# Patient Record
Sex: FEMALE | Race: WHITE | Employment: OTHER | URBAN - METROPOLITAN AREA
[De-identification: names, ages, dates, MRNs, and addresses within clinical notes are randomized per-mention and may not be internally consistent; named-entity substitution may affect disease eponyms.]

---

## 2017-01-27 RX ORDER — ROSUVASTATIN CALCIUM 10 MG/1
TABLET, COATED ORAL
Qty: 30 TABLET | Refills: 3 | Status: SHIPPED | OUTPATIENT
Start: 2017-01-27 | End: 2017-06-02 | Stop reason: SDUPTHER

## 2017-01-27 RX ORDER — LEVOTHYROXINE SODIUM 0.05 MG/1
TABLET ORAL
Qty: 30 TABLET | Refills: 3 | Status: SHIPPED | OUTPATIENT
Start: 2017-01-27 | End: 2017-05-01 | Stop reason: SDUPTHER

## 2017-01-27 RX ORDER — FLUTICASONE PROPIONATE 50 MCG
SPRAY, SUSPENSION (ML) NASAL
Qty: 1 BOTTLE | Refills: 3 | Status: SHIPPED | OUTPATIENT
Start: 2017-01-27 | End: 2018-12-17 | Stop reason: SDUPTHER

## 2017-01-30 RX ORDER — SERTRALINE HYDROCHLORIDE 25 MG/1
TABLET, FILM COATED ORAL
Qty: 30 TABLET | Refills: 3 | Status: SHIPPED | OUTPATIENT
Start: 2017-01-30 | End: 2017-05-01 | Stop reason: SDUPTHER

## 2017-01-30 RX ORDER — PANTOPRAZOLE SODIUM 40 MG/1
TABLET, DELAYED RELEASE ORAL
Qty: 30 TABLET | Refills: 3 | Status: SHIPPED | OUTPATIENT
Start: 2017-01-30 | End: 2017-05-01 | Stop reason: SDUPTHER

## 2017-03-22 ENCOUNTER — OFFICE VISIT (OUTPATIENT)
Dept: INTERNAL MEDICINE | Age: 82
End: 2017-03-22

## 2017-03-22 VITALS
SYSTOLIC BLOOD PRESSURE: 142 MMHG | TEMPERATURE: 97.3 F | WEIGHT: 202 LBS | HEART RATE: 72 BPM | HEIGHT: 62 IN | BODY MASS INDEX: 37.17 KG/M2 | DIASTOLIC BLOOD PRESSURE: 90 MMHG | OXYGEN SATURATION: 92 %

## 2017-03-22 DIAGNOSIS — R35.0 FREQUENCY OF URINATION: ICD-10-CM

## 2017-03-22 DIAGNOSIS — E03.9 ACQUIRED HYPOTHYROIDISM: Chronic | ICD-10-CM

## 2017-03-22 DIAGNOSIS — I10 ESSENTIAL HYPERTENSION: Primary | Chronic | ICD-10-CM

## 2017-03-22 PROCEDURE — 1123F ACP DISCUSS/DSCN MKR DOCD: CPT | Performed by: INTERNAL MEDICINE

## 2017-03-22 PROCEDURE — 99213 OFFICE O/P EST LOW 20 MIN: CPT | Performed by: INTERNAL MEDICINE

## 2017-03-22 PROCEDURE — 1036F TOBACCO NON-USER: CPT | Performed by: INTERNAL MEDICINE

## 2017-03-22 PROCEDURE — G8417 CALC BMI ABV UP PARAM F/U: HCPCS | Performed by: INTERNAL MEDICINE

## 2017-03-22 PROCEDURE — 4040F PNEUMOC VAC/ADMIN/RCVD: CPT | Performed by: INTERNAL MEDICINE

## 2017-03-22 PROCEDURE — G8484 FLU IMMUNIZE NO ADMIN: HCPCS | Performed by: INTERNAL MEDICINE

## 2017-03-22 PROCEDURE — 1090F PRES/ABSN URINE INCON ASSESS: CPT | Performed by: INTERNAL MEDICINE

## 2017-03-22 PROCEDURE — G8427 DOCREV CUR MEDS BY ELIG CLIN: HCPCS | Performed by: INTERNAL MEDICINE

## 2017-03-22 PROCEDURE — G8400 PT W/DXA NO RESULTS DOC: HCPCS | Performed by: INTERNAL MEDICINE

## 2017-03-22 ASSESSMENT — ENCOUNTER SYMPTOMS
WHEEZING: 0
CONSTIPATION: 0
SHORTNESS OF BREATH: 1
CHOKING: 0
NAUSEA: 0
ABDOMINAL DISTENTION: 0
ORTHOPNEA: 0
EYE PAIN: 0
TROUBLE SWALLOWING: 0
BACK PAIN: 1
COUGH: 0

## 2017-03-24 RX ORDER — VALSARTAN AND HYDROCHLOROTHIAZIDE 160; 25 MG/1; MG/1
TABLET ORAL
Qty: 30 TABLET | Refills: 3 | Status: SHIPPED | OUTPATIENT
Start: 2017-03-24 | End: 2017-05-09

## 2017-05-01 RX ORDER — PANTOPRAZOLE SODIUM 40 MG/1
40 TABLET, DELAYED RELEASE ORAL DAILY PRN
Qty: 30 TABLET | Refills: 2 | Status: SHIPPED | OUTPATIENT
Start: 2017-05-01 | End: 2017-06-28

## 2017-05-01 RX ORDER — LEVOTHYROXINE SODIUM 0.05 MG/1
TABLET ORAL
Qty: 30 TABLET | Refills: 2 | Status: SHIPPED | OUTPATIENT
Start: 2017-05-01 | End: 2017-07-31 | Stop reason: SDUPTHER

## 2017-05-01 RX ORDER — SERTRALINE HYDROCHLORIDE 25 MG/1
TABLET, FILM COATED ORAL
Qty: 30 TABLET | Refills: 2 | Status: SHIPPED | OUTPATIENT
Start: 2017-05-01 | End: 2017-07-31 | Stop reason: SDUPTHER

## 2017-05-03 DIAGNOSIS — E03.9 ACQUIRED HYPOTHYROIDISM: Chronic | ICD-10-CM

## 2017-05-03 DIAGNOSIS — I10 ESSENTIAL HYPERTENSION: Chronic | ICD-10-CM

## 2017-05-03 DIAGNOSIS — R35.0 FREQUENCY OF URINATION: ICD-10-CM

## 2017-05-03 LAB
ANION GAP SERPL CALCULATED.3IONS-SCNC: 12 MEQ/L (ref 7–13)
BACTERIA: NORMAL /HPF
BILIRUBIN URINE: NEGATIVE
BLOOD, URINE: NEGATIVE
BUN BLDV-MCNC: 22 MG/DL (ref 8–23)
CALCIUM SERPL-MCNC: 9.2 MG/DL (ref 8.6–10.2)
CHLORIDE BLD-SCNC: 97 MEQ/L (ref 98–107)
CHOLESTEROL, TOTAL: 157 MG/DL (ref 0–199)
CLARITY: ABNORMAL
CO2: 31 MEQ/L (ref 22–29)
COLOR: YELLOW
CREAT SERPL-MCNC: 0.7 MG/DL (ref 0.5–0.9)
EPITHELIAL CELLS, UA: NORMAL /HPF
GFR AFRICAN AMERICAN: >60
GFR NON-AFRICAN AMERICAN: >60
GLUCOSE BLD-MCNC: 104 MG/DL (ref 74–109)
GLUCOSE URINE: NEGATIVE MG/DL
HCT VFR BLD CALC: 41.7 % (ref 37–47)
HDLC SERPL-MCNC: 48 MG/DL (ref 40–59)
HEMOGLOBIN: 14.2 G/DL (ref 12–16)
KETONES, URINE: NEGATIVE MG/DL
LDL CHOLESTEROL CALCULATED: 87 MG/DL (ref 0–129)
LEUKOCYTE ESTERASE, URINE: ABNORMAL
MCH RBC QN AUTO: 30.7 PG (ref 27–31.3)
MCHC RBC AUTO-ENTMCNC: 34.1 % (ref 33–37)
MCV RBC AUTO: 90.3 FL (ref 82–100)
NITRITE, URINE: NEGATIVE
PDW BLD-RTO: 13.8 % (ref 11.5–14.5)
PH UA: 5 (ref 5–9)
PLATELET # BLD: 184 K/UL (ref 130–400)
POTASSIUM SERPL-SCNC: 3.2 MEQ/L (ref 3.5–5.1)
PROTEIN UA: NEGATIVE MG/DL
RBC # BLD: 4.62 M/UL (ref 4.2–5.4)
RBC UA: NORMAL /HPF (ref 0–2)
RENAL EPITHELIAL, UA: NORMAL /HPF
SODIUM BLD-SCNC: 140 MEQ/L (ref 132–144)
SPECIFIC GRAVITY UA: 1.02 (ref 1–1.03)
TRIGL SERPL-MCNC: 112 MG/DL (ref 0–200)
TSH SERPL DL<=0.05 MIU/L-ACNC: 2.46 UIU/ML (ref 0.27–4.2)
UROBILINOGEN, URINE: 0.2 E.U./DL
WBC # BLD: 6.3 K/UL (ref 4.8–10.8)
WBC UA: NORMAL /HPF (ref 0–5)

## 2017-05-04 LAB — URINE CULTURE, ROUTINE: NORMAL

## 2017-05-09 DIAGNOSIS — E87.6 HYPOKALEMIA: Primary | ICD-10-CM

## 2017-05-09 RX ORDER — VALSARTAN AND HYDROCHLOROTHIAZIDE 160; 12.5 MG/1; MG/1
1 TABLET, FILM COATED ORAL DAILY
Qty: 30 TABLET | Refills: 3 | Status: SHIPPED | OUTPATIENT
Start: 2017-05-09 | End: 2017-07-31 | Stop reason: SDUPTHER

## 2017-05-09 RX ORDER — POTASSIUM CHLORIDE 20 MEQ/1
TABLET, EXTENDED RELEASE ORAL
Qty: 7 TABLET | Refills: 0 | Status: SHIPPED | OUTPATIENT
Start: 2017-05-09 | End: 2017-08-22 | Stop reason: ALTCHOICE

## 2017-05-24 RX ORDER — PANTOPRAZOLE SODIUM 40 MG/1
TABLET, DELAYED RELEASE ORAL
Qty: 30 TABLET | OUTPATIENT
Start: 2017-05-24

## 2017-06-05 RX ORDER — ROSUVASTATIN CALCIUM 10 MG/1
TABLET, COATED ORAL
Qty: 30 TABLET | Refills: 3 | Status: SHIPPED | OUTPATIENT
Start: 2017-06-05 | End: 2017-08-22 | Stop reason: SDUPTHER

## 2017-06-28 RX ORDER — PANTOPRAZOLE SODIUM 40 MG/1
40 TABLET, DELAYED RELEASE ORAL DAILY PRN
Qty: 30 TABLET | Refills: 3 | Status: SHIPPED | OUTPATIENT
Start: 2017-06-28 | End: 2017-08-22 | Stop reason: SDUPTHER

## 2017-06-28 RX ORDER — VALSARTAN AND HYDROCHLOROTHIAZIDE 160; 25 MG/1; MG/1
TABLET ORAL
Refills: 3 | COMMUNITY
Start: 2017-06-01 | End: 2017-06-28

## 2017-07-07 DIAGNOSIS — E87.6 HYPOKALEMIA: ICD-10-CM

## 2017-07-07 LAB
ANION GAP SERPL CALCULATED.3IONS-SCNC: 12 MEQ/L (ref 7–13)
BUN BLDV-MCNC: 16 MG/DL (ref 8–23)
CALCIUM SERPL-MCNC: 9.3 MG/DL (ref 8.6–10.2)
CHLORIDE BLD-SCNC: 98 MEQ/L (ref 98–107)
CO2: 27 MEQ/L (ref 22–29)
CREAT SERPL-MCNC: 0.64 MG/DL (ref 0.5–0.9)
GFR AFRICAN AMERICAN: >60
GFR NON-AFRICAN AMERICAN: >60
GLUCOSE BLD-MCNC: 102 MG/DL (ref 74–109)
POTASSIUM SERPL-SCNC: 4 MEQ/L (ref 3.5–5.1)
SODIUM BLD-SCNC: 137 MEQ/L (ref 132–144)

## 2017-07-31 RX ORDER — SERTRALINE HYDROCHLORIDE 25 MG/1
TABLET, FILM COATED ORAL
Qty: 30 TABLET | Refills: 5 | Status: SHIPPED | OUTPATIENT
Start: 2017-07-31 | End: 2017-08-22 | Stop reason: SDUPTHER

## 2017-07-31 RX ORDER — LEVOTHYROXINE SODIUM 0.05 MG/1
TABLET ORAL
Qty: 30 TABLET | Refills: 5 | Status: SHIPPED | OUTPATIENT
Start: 2017-07-31 | End: 2017-08-22 | Stop reason: SDUPTHER

## 2017-07-31 RX ORDER — VALSARTAN AND HYDROCHLOROTHIAZIDE 160; 12.5 MG/1; MG/1
TABLET, FILM COATED ORAL
Qty: 30 TABLET | Refills: 5 | Status: SHIPPED | OUTPATIENT
Start: 2017-07-31 | End: 2017-08-22

## 2017-08-22 ENCOUNTER — OFFICE VISIT (OUTPATIENT)
Dept: INTERNAL MEDICINE | Age: 82
End: 2017-08-22

## 2017-08-22 VITALS
TEMPERATURE: 99 F | WEIGHT: 204 LBS | OXYGEN SATURATION: 98 % | BODY MASS INDEX: 37.54 KG/M2 | HEIGHT: 62 IN | SYSTOLIC BLOOD PRESSURE: 110 MMHG | HEART RATE: 67 BPM | DIASTOLIC BLOOD PRESSURE: 70 MMHG
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
## 2017-08-22 DIAGNOSIS — Z12.31 VISIT FOR SCREENING MAMMOGRAM: ICD-10-CM

## 2017-08-22 DIAGNOSIS — E78.2 MIXED HYPERLIPIDEMIA: Chronic | ICD-10-CM

## 2017-08-22 DIAGNOSIS — J44.9 CHRONIC OBSTRUCTIVE PULMONARY DISEASE, UNSPECIFIED COPD TYPE (HCC): ICD-10-CM

## 2017-08-22 DIAGNOSIS — I10 ESSENTIAL HYPERTENSION: Primary | Chronic | ICD-10-CM

## 2017-08-22 PROCEDURE — G8427 DOCREV CUR MEDS BY ELIG CLIN: HCPCS | Performed by: INTERNAL MEDICINE

## 2017-08-22 PROCEDURE — 99214 OFFICE O/P EST MOD 30 MIN: CPT | Performed by: INTERNAL MEDICINE

## 2017-08-22 PROCEDURE — 1036F TOBACCO NON-USER: CPT | Performed by: INTERNAL MEDICINE

## 2017-08-22 PROCEDURE — 4040F PNEUMOC VAC/ADMIN/RCVD: CPT | Performed by: INTERNAL MEDICINE

## 2017-08-22 PROCEDURE — G8417 CALC BMI ABV UP PARAM F/U: HCPCS | Performed by: INTERNAL MEDICINE

## 2017-08-22 PROCEDURE — G8926 SPIRO NO PERF OR DOC: HCPCS | Performed by: INTERNAL MEDICINE

## 2017-08-22 PROCEDURE — 1090F PRES/ABSN URINE INCON ASSESS: CPT | Performed by: INTERNAL MEDICINE

## 2017-08-22 PROCEDURE — G8400 PT W/DXA NO RESULTS DOC: HCPCS | Performed by: INTERNAL MEDICINE

## 2017-08-22 PROCEDURE — 1123F ACP DISCUSS/DSCN MKR DOCD: CPT | Performed by: INTERNAL MEDICINE

## 2017-08-22 PROCEDURE — 3023F SPIROM DOC REV: CPT | Performed by: INTERNAL MEDICINE

## 2017-08-22 RX ORDER — PANTOPRAZOLE SODIUM 40 MG/1
40 TABLET, DELAYED RELEASE ORAL DAILY PRN
Qty: 90 TABLET | Refills: 1 | Status: SHIPPED | OUTPATIENT
Start: 2017-08-22 | End: 2018-04-30 | Stop reason: SDUPTHER

## 2017-08-22 RX ORDER — ROSUVASTATIN CALCIUM 10 MG/1
TABLET, COATED ORAL
Qty: 90 TABLET | Refills: 1 | Status: SHIPPED | OUTPATIENT
Start: 2017-08-22 | End: 2017-11-16

## 2017-08-22 RX ORDER — LEVOTHYROXINE SODIUM 0.05 MG/1
TABLET ORAL
Qty: 90 TABLET | Refills: 1 | Status: SHIPPED | OUTPATIENT
Start: 2017-08-22 | End: 2018-02-19

## 2017-08-22 RX ORDER — VALSARTAN 160 MG/1
160 TABLET ORAL NIGHTLY
Qty: 90 TABLET | Refills: 1 | Status: SHIPPED | OUTPATIENT
Start: 2017-08-22 | End: 2018-01-21 | Stop reason: SDUPTHER

## 2017-08-22 RX ORDER — SERTRALINE HYDROCHLORIDE 25 MG/1
25 TABLET, FILM COATED ORAL DAILY
Qty: 90 TABLET | Refills: 1 | Status: SHIPPED | OUTPATIENT
Start: 2017-08-22 | End: 2018-07-25 | Stop reason: SDUPTHER

## 2017-08-22 ASSESSMENT — ENCOUNTER SYMPTOMS
CHOKING: 0
ABDOMINAL DISTENTION: 0
CONSTIPATION: 0
WHEEZING: 0
EYE PAIN: 0
NAUSEA: 0
BOWEL INCONTINENCE: 0
TROUBLE SWALLOWING: 0
SHORTNESS OF BREATH: 1
ORTHOPNEA: 0
BACK PAIN: 1
COUGH: 0

## 2017-08-31 ENCOUNTER — APPOINTMENT (OUTPATIENT)
Dept: GENERAL RADIOLOGY | Age: 82
DRG: 379 | End: 2017-08-31
Payer: MEDICARE

## 2017-08-31 ENCOUNTER — APPOINTMENT (OUTPATIENT)
Dept: CT IMAGING | Age: 82
DRG: 379 | End: 2017-08-31
Payer: MEDICARE

## 2017-08-31 ENCOUNTER — HOSPITAL ENCOUNTER (INPATIENT)
Age: 82
LOS: 1 days | Discharge: HOME | DRG: 379 | End: 2017-09-01
Attending: INTERNAL MEDICINE | Admitting: INTERNAL MEDICINE
Payer: MEDICARE

## 2017-08-31 DIAGNOSIS — K57.93 GASTROINTESTINAL HEMORRHAGE ASSOCIATED WITH INTESTINAL DIVERTICULITIS: Primary | ICD-10-CM

## 2017-08-31 LAB
ABO/RH: NORMAL
ALBUMIN SERPL-MCNC: 4 G/DL (ref 3.9–4.9)
ALP BLD-CCNC: 69 U/L (ref 40–130)
ALT SERPL-CCNC: 65 U/L (ref 0–33)
ANION GAP SERPL CALCULATED.3IONS-SCNC: 12 MEQ/L (ref 7–13)
ANTIBODY SCREEN: NORMAL
APTT: 25.4 SEC (ref 21.6–35.4)
AST SERPL-CCNC: 55 U/L (ref 0–35)
BILIRUB SERPL-MCNC: 0.4 MG/DL (ref 0–1.2)
BUN BLDV-MCNC: 18 MG/DL (ref 8–23)
CALCIUM SERPL-MCNC: 8.9 MG/DL (ref 8.6–10.2)
CHLORIDE BLD-SCNC: 102 MEQ/L (ref 98–107)
CO2: 28 MEQ/L (ref 22–29)
CREAT SERPL-MCNC: 0.49 MG/DL (ref 0.5–0.9)
GFR AFRICAN AMERICAN: >60
GFR NON-AFRICAN AMERICAN: >60
GLOBULIN: 2.6 G/DL (ref 2.3–3.5)
GLUCOSE BLD-MCNC: 80 MG/DL (ref 74–109)
HCT VFR BLD CALC: 39.3 % (ref 37–47)
HEMOGLOBIN: 12.8 G/DL (ref 12–16)
INR BLD: 1
LIPASE: 17 U/L (ref 13–60)
MCH RBC QN AUTO: 29.5 PG (ref 27–31.3)
MCHC RBC AUTO-ENTMCNC: 32.5 % (ref 33–37)
MCV RBC AUTO: 90.8 FL (ref 82–100)
PDW BLD-RTO: 13.7 % (ref 11.5–14.5)
PLATELET # BLD: 163 K/UL (ref 130–400)
POTASSIUM SERPL-SCNC: 3.9 MEQ/L (ref 3.5–5.1)
PROTHROMBIN TIME: 10.7 SEC (ref 8.1–13.7)
RBC # BLD: 4.33 M/UL (ref 4.2–5.4)
SODIUM BLD-SCNC: 142 MEQ/L (ref 132–144)
TOTAL CK: 78 U/L (ref 0–170)
TOTAL PROTEIN: 6.6 G/DL (ref 6.4–8.1)
TROPONIN: <0.01 NG/ML (ref 0–0.01)
WBC # BLD: 6.1 K/UL (ref 4.8–10.8)

## 2017-08-31 PROCEDURE — 86901 BLOOD TYPING SEROLOGIC RH(D): CPT

## 2017-08-31 PROCEDURE — 74177 CT ABD & PELVIS W/CONTRAST: CPT

## 2017-08-31 PROCEDURE — 6360000002 HC RX W HCPCS: Performed by: PHYSICIAN ASSISTANT

## 2017-08-31 PROCEDURE — 1210000000 HC MED SURG R&B

## 2017-08-31 PROCEDURE — 85027 COMPLETE CBC AUTOMATED: CPT

## 2017-08-31 PROCEDURE — 84484 ASSAY OF TROPONIN QUANT: CPT

## 2017-08-31 PROCEDURE — 6370000000 HC RX 637 (ALT 250 FOR IP): Performed by: INTERNAL MEDICINE

## 2017-08-31 PROCEDURE — 99285 EMERGENCY DEPT VISIT HI MDM: CPT

## 2017-08-31 PROCEDURE — 86850 RBC ANTIBODY SCREEN: CPT

## 2017-08-31 PROCEDURE — 2500000003 HC RX 250 WO HCPCS: Performed by: PHYSICIAN ASSISTANT

## 2017-08-31 PROCEDURE — 83690 ASSAY OF LIPASE: CPT

## 2017-08-31 PROCEDURE — 71010 XR CHEST PORTABLE: CPT

## 2017-08-31 PROCEDURE — 6370000000 HC RX 637 (ALT 250 FOR IP): Performed by: SPECIALIST

## 2017-08-31 PROCEDURE — 2580000003 HC RX 258: Performed by: SPECIALIST

## 2017-08-31 PROCEDURE — 36415 COLL VENOUS BLD VENIPUNCTURE: CPT

## 2017-08-31 PROCEDURE — 80053 COMPREHEN METABOLIC PANEL: CPT

## 2017-08-31 PROCEDURE — 93005 ELECTROCARDIOGRAM TRACING: CPT

## 2017-08-31 PROCEDURE — 85730 THROMBOPLASTIN TIME PARTIAL: CPT

## 2017-08-31 PROCEDURE — C9113 INJ PANTOPRAZOLE SODIUM, VIA: HCPCS | Performed by: PHYSICIAN ASSISTANT

## 2017-08-31 PROCEDURE — 86900 BLOOD TYPING SEROLOGIC ABO: CPT

## 2017-08-31 PROCEDURE — 85610 PROTHROMBIN TIME: CPT

## 2017-08-31 PROCEDURE — 2580000003 HC RX 258: Performed by: PHYSICIAN ASSISTANT

## 2017-08-31 PROCEDURE — 82550 ASSAY OF CK (CPK): CPT

## 2017-08-31 PROCEDURE — 6360000004 HC RX CONTRAST MEDICATION: Performed by: RADIOLOGY

## 2017-08-31 RX ORDER — LEVOTHYROXINE SODIUM 0.05 MG/1
50 TABLET ORAL DAILY
Status: DISCONTINUED | OUTPATIENT
Start: 2017-09-02 | End: 2017-09-01 | Stop reason: HOSPADM

## 2017-08-31 RX ORDER — DOCUSATE SODIUM 100 MG/1
100 CAPSULE, LIQUID FILLED ORAL
COMMUNITY
End: 2019-06-18 | Stop reason: ALTCHOICE

## 2017-08-31 RX ORDER — 0.9 % SODIUM CHLORIDE 0.9 %
10 VIAL (ML) INJECTION PRN
Status: DISCONTINUED | OUTPATIENT
Start: 2017-08-31 | End: 2017-09-01 | Stop reason: HOSPADM

## 2017-08-31 RX ORDER — PANTOPRAZOLE SODIUM 40 MG/10ML
80 INJECTION, POWDER, LYOPHILIZED, FOR SOLUTION INTRAVENOUS DAILY
Status: DISCONTINUED | OUTPATIENT
Start: 2017-08-31 | End: 2017-08-31 | Stop reason: SDUPTHER

## 2017-08-31 RX ORDER — ONDANSETRON 2 MG/ML
4 INJECTION INTRAMUSCULAR; INTRAVENOUS EVERY 6 HOURS PRN
Status: DISCONTINUED | OUTPATIENT
Start: 2017-08-31 | End: 2017-09-01 | Stop reason: HOSPADM

## 2017-08-31 RX ORDER — 0.9 % SODIUM CHLORIDE 0.9 %
10 VIAL (ML) INJECTION DAILY
Status: DISCONTINUED | OUTPATIENT
Start: 2017-08-31 | End: 2017-08-31 | Stop reason: SDUPTHER

## 2017-08-31 RX ORDER — LEVOTHYROXINE SODIUM ANHYDROUS 100 UG/5ML
25 INJECTION, POWDER, LYOPHILIZED, FOR SOLUTION INTRAVENOUS ONCE
Status: COMPLETED | OUTPATIENT
Start: 2017-09-01 | End: 2017-09-01

## 2017-08-31 RX ORDER — SODIUM CHLORIDE 0.9 % (FLUSH) 0.9 %
10 SYRINGE (ML) INJECTION EVERY 12 HOURS SCHEDULED
Status: DISCONTINUED | OUTPATIENT
Start: 2017-08-31 | End: 2017-09-01 | Stop reason: HOSPADM

## 2017-08-31 RX ORDER — SODIUM CHLORIDE 9 MG/ML
INJECTION, SOLUTION INTRAVENOUS CONTINUOUS
Status: DISCONTINUED | OUTPATIENT
Start: 2017-08-31 | End: 2017-09-01 | Stop reason: HOSPADM

## 2017-08-31 RX ORDER — LANOLIN ALCOHOL/MO/W.PET/CERES
1000 CREAM (GRAM) TOPICAL DAILY
COMMUNITY

## 2017-08-31 RX ORDER — SODIUM CHLORIDE 0.9 % (FLUSH) 0.9 %
10 SYRINGE (ML) INJECTION PRN
Status: DISCONTINUED | OUTPATIENT
Start: 2017-08-31 | End: 2017-09-01 | Stop reason: HOSPADM

## 2017-08-31 RX ORDER — VALSARTAN 160 MG/1
160 TABLET ORAL DAILY
Status: DISCONTINUED | OUTPATIENT
Start: 2017-08-31 | End: 2017-09-01 | Stop reason: HOSPADM

## 2017-08-31 RX ORDER — 0.9 % SODIUM CHLORIDE 0.9 %
10 VIAL (ML) INJECTION EVERY 12 HOURS SCHEDULED
Status: DISCONTINUED | OUTPATIENT
Start: 2017-08-31 | End: 2017-09-01 | Stop reason: HOSPADM

## 2017-08-31 RX ORDER — ACETAMINOPHEN 500 MG
500 TABLET ORAL EVERY 6 HOURS PRN
COMMUNITY

## 2017-08-31 RX ORDER — ACETAMINOPHEN 325 MG/1
650 TABLET ORAL EVERY 4 HOURS PRN
Status: DISCONTINUED | OUTPATIENT
Start: 2017-08-31 | End: 2017-09-01 | Stop reason: HOSPADM

## 2017-08-31 RX ORDER — ACETAMINOPHEN 160 MG
2000 TABLET,DISINTEGRATING ORAL
COMMUNITY

## 2017-08-31 RX ADMIN — SODIUM CHLORIDE, PRESERVATIVE FREE 10 ML: 5 INJECTION INTRAVENOUS at 22:19

## 2017-08-31 RX ADMIN — VALSARTAN 160 MG: 160 TABLET ORAL at 22:19

## 2017-08-31 RX ADMIN — METRONIDAZOLE 500 MG: 500 INJECTION, SOLUTION INTRAVENOUS at 23:23

## 2017-08-31 RX ADMIN — IOPAMIDOL 100 ML: 755 INJECTION, SOLUTION INTRAVENOUS at 14:55

## 2017-08-31 RX ADMIN — SODIUM CHLORIDE, PRESERVATIVE FREE 10 ML: 5 INJECTION INTRAVENOUS at 22:20

## 2017-08-31 RX ADMIN — POLYETHYLENE GLYCOL-3350 AND ELECTROLYTES 2000 ML: 236; 6.74; 5.86; 2.97; 22.74 POWDER, FOR SOLUTION ORAL at 22:19

## 2017-08-31 RX ADMIN — PANTOPRAZOLE SODIUM 80 MG: 40 INJECTION, POWDER, FOR SOLUTION INTRAVENOUS at 15:06

## 2017-08-31 RX ADMIN — SODIUM CHLORIDE: 9 INJECTION, SOLUTION INTRAVENOUS at 22:19

## 2017-08-31 ASSESSMENT — ENCOUNTER SYMPTOMS
DIARRHEA: 0
ABDOMINAL PAIN: 0
RECTAL PAIN: 0
ANAL BLEEDING: 0
BACK PAIN: 0
COLOR CHANGE: 0
RHINORRHEA: 0
NAUSEA: 0
BLOOD IN STOOL: 1
CONSTIPATION: 0
ABDOMINAL DISTENTION: 0
STRIDOR: 0
SHORTNESS OF BREATH: 0
EYE DISCHARGE: 0
WHEEZING: 0
SORE THROAT: 0
VOMITING: 0

## 2017-09-01 ENCOUNTER — ANESTHESIA EVENT (OUTPATIENT)
Dept: OPERATING ROOM | Age: 82
DRG: 379 | End: 2017-09-01
Payer: MEDICARE

## 2017-09-01 ENCOUNTER — ANESTHESIA (OUTPATIENT)
Dept: OPERATING ROOM | Age: 82
DRG: 379 | End: 2017-09-01
Payer: MEDICARE

## 2017-09-01 VITALS
DIASTOLIC BLOOD PRESSURE: 66 MMHG | OXYGEN SATURATION: 93 % | SYSTOLIC BLOOD PRESSURE: 133 MMHG | HEIGHT: 66 IN | HEART RATE: 93 BPM | TEMPERATURE: 99.1 F | RESPIRATION RATE: 14 BRPM | BODY MASS INDEX: 33.16 KG/M2 | WEIGHT: 206.35 LBS

## 2017-09-01 VITALS — SYSTOLIC BLOOD PRESSURE: 122 MMHG | OXYGEN SATURATION: 94 % | DIASTOLIC BLOOD PRESSURE: 60 MMHG

## 2017-09-01 LAB
ANION GAP SERPL CALCULATED.3IONS-SCNC: 12 MEQ/L (ref 7–13)
BASOPHILS ABSOLUTE: 0.1 K/UL (ref 0–0.2)
BASOPHILS RELATIVE PERCENT: 0.9 %
BUN BLDV-MCNC: 13 MG/DL (ref 8–23)
CALCIUM SERPL-MCNC: 8.4 MG/DL (ref 8.6–10.2)
CHLORIDE BLD-SCNC: 101 MEQ/L (ref 98–107)
CO2: 27 MEQ/L (ref 22–29)
CREAT SERPL-MCNC: 0.51 MG/DL (ref 0.5–0.9)
EKG ATRIAL RATE: 85 BPM
EKG P-R INTERVAL: 276 MS
EKG Q-T INTERVAL: 436 MS
EKG QRS DURATION: 152 MS
EKG QTC CALCULATION (BAZETT): 518 MS
EKG R AXIS: -64 DEGREES
EKG T AXIS: 57 DEGREES
EKG VENTRICULAR RATE: 85 BPM
EOSINOPHILS ABSOLUTE: 0.2 K/UL (ref 0–0.7)
EOSINOPHILS RELATIVE PERCENT: 3 %
GFR AFRICAN AMERICAN: >60
GFR AFRICAN AMERICAN: >60
GFR NON-AFRICAN AMERICAN: >60
GFR NON-AFRICAN AMERICAN: >60
GLUCOSE BLD-MCNC: 103 MG/DL (ref 74–109)
HCT VFR BLD CALC: 37 % (ref 37–47)
HEMOGLOBIN: 12.1 G/DL (ref 12–16)
LYMPHOCYTES ABSOLUTE: 1.1 K/UL (ref 1–4.8)
LYMPHOCYTES RELATIVE PERCENT: 19.1 %
MCH RBC QN AUTO: 29.8 PG (ref 27–31.3)
MCHC RBC AUTO-ENTMCNC: 32.8 % (ref 33–37)
MCV RBC AUTO: 91 FL (ref 82–100)
MONOCYTES ABSOLUTE: 0.6 K/UL (ref 0.2–0.8)
MONOCYTES RELATIVE PERCENT: 10.2 %
NEUTROPHILS ABSOLUTE: 3.8 K/UL (ref 1.4–6.5)
NEUTROPHILS RELATIVE PERCENT: 66.8 %
PDW BLD-RTO: 13.8 % (ref 11.5–14.5)
PERFORMED ON: NORMAL
PLATELET # BLD: 149 K/UL (ref 130–400)
POC CREATININE: 0.6 MG/DL (ref 0.6–1.2)
POC SAMPLE TYPE: NORMAL
POTASSIUM SERPL-SCNC: 3.9 MEQ/L (ref 3.5–5.1)
RBC # BLD: 4.06 M/UL (ref 4.2–5.4)
SODIUM BLD-SCNC: 140 MEQ/L (ref 132–144)
WBC # BLD: 5.7 K/UL (ref 4.8–10.8)

## 2017-09-01 PROCEDURE — 6360000002 HC RX W HCPCS: Performed by: NURSE ANESTHETIST, CERTIFIED REGISTERED

## 2017-09-01 PROCEDURE — 85025 COMPLETE CBC W/AUTO DIFF WBC: CPT

## 2017-09-01 PROCEDURE — 93010 ELECTROCARDIOGRAM REPORT: CPT | Performed by: INTERNAL MEDICINE

## 2017-09-01 PROCEDURE — 88305 TISSUE EXAM BY PATHOLOGIST: CPT

## 2017-09-01 PROCEDURE — 87040 BLOOD CULTURE FOR BACTERIA: CPT

## 2017-09-01 PROCEDURE — 6360000002 HC RX W HCPCS: Performed by: PHYSICIAN ASSISTANT

## 2017-09-01 PROCEDURE — 0DBN8ZZ EXCISION OF SIGMOID COLON, VIA NATURAL OR ARTIFICIAL OPENING ENDOSCOPIC: ICD-10-PCS | Performed by: SPECIALIST

## 2017-09-01 PROCEDURE — 3609017100 HC EGD: Performed by: SPECIALIST

## 2017-09-01 PROCEDURE — 2500000003 HC RX 250 WO HCPCS: Performed by: PHYSICIAN ASSISTANT

## 2017-09-01 PROCEDURE — 80048 BASIC METABOLIC PNL TOTAL CA: CPT

## 2017-09-01 PROCEDURE — C1773 RET DEV, INSERTABLE: HCPCS | Performed by: SPECIALIST

## 2017-09-01 PROCEDURE — 2580000003 HC RX 258: Performed by: SPECIALIST

## 2017-09-01 PROCEDURE — 7100000001 HC PACU RECOVERY - ADDTL 15 MIN: Performed by: SPECIALIST

## 2017-09-01 PROCEDURE — 2580000003 HC RX 258: Performed by: PHYSICIAN ASSISTANT

## 2017-09-01 PROCEDURE — 2500000003 HC RX 250 WO HCPCS: Performed by: INTERNAL MEDICINE

## 2017-09-01 PROCEDURE — 3700000000 HC ANESTHESIA ATTENDED CARE: Performed by: SPECIALIST

## 2017-09-01 PROCEDURE — 0DJ08ZZ INSPECTION OF UPPER INTESTINAL TRACT, VIA NATURAL OR ARTIFICIAL OPENING ENDOSCOPIC: ICD-10-PCS | Performed by: SPECIALIST

## 2017-09-01 PROCEDURE — 2580000003 HC RX 258: Performed by: NURSE ANESTHETIST, CERTIFIED REGISTERED

## 2017-09-01 PROCEDURE — 3700000001 HC ADD 15 MINUTES (ANESTHESIA): Performed by: SPECIALIST

## 2017-09-01 PROCEDURE — 7100000000 HC PACU RECOVERY - FIRST 15 MIN: Performed by: SPECIALIST

## 2017-09-01 PROCEDURE — 36415 COLL VENOUS BLD VENIPUNCTURE: CPT

## 2017-09-01 PROCEDURE — 3609027000 HC COLONOSCOPY: Performed by: SPECIALIST

## 2017-09-01 PROCEDURE — 6370000000 HC RX 637 (ALT 250 FOR IP): Performed by: INTERNAL MEDICINE

## 2017-09-01 RX ORDER — SODIUM CHLORIDE, SODIUM LACTATE, POTASSIUM CHLORIDE, CALCIUM CHLORIDE 600; 310; 30; 20 MG/100ML; MG/100ML; MG/100ML; MG/100ML
INJECTION, SOLUTION INTRAVENOUS CONTINUOUS PRN
Status: DISCONTINUED | OUTPATIENT
Start: 2017-09-01 | End: 2017-09-01 | Stop reason: SDUPTHER

## 2017-09-01 RX ORDER — PROPOFOL 10 MG/ML
INJECTION, EMULSION INTRAVENOUS PRN
Status: DISCONTINUED | OUTPATIENT
Start: 2017-09-01 | End: 2017-09-01 | Stop reason: SDUPTHER

## 2017-09-01 RX ORDER — ONDANSETRON 2 MG/ML
4 INJECTION INTRAMUSCULAR; INTRAVENOUS
Status: DISCONTINUED | OUTPATIENT
Start: 2017-09-01 | End: 2017-09-01 | Stop reason: HOSPADM

## 2017-09-01 RX ORDER — MAGNESIUM HYDROXIDE 1200 MG/15ML
LIQUID ORAL PRN
Status: DISCONTINUED | OUTPATIENT
Start: 2017-09-01 | End: 2017-09-01 | Stop reason: HOSPADM

## 2017-09-01 RX ADMIN — VALSARTAN 160 MG: 160 TABLET ORAL at 08:59

## 2017-09-01 RX ADMIN — METRONIDAZOLE 500 MG: 500 INJECTION, SOLUTION INTRAVENOUS at 15:51

## 2017-09-01 RX ADMIN — SODIUM CHLORIDE, POTASSIUM CHLORIDE, SODIUM LACTATE AND CALCIUM CHLORIDE: 600; 310; 30; 20 INJECTION, SOLUTION INTRAVENOUS at 13:20

## 2017-09-01 RX ADMIN — PROPOFOL 20 MG: 10 INJECTION, EMULSION INTRAVENOUS at 13:43

## 2017-09-01 RX ADMIN — PROPOFOL 40 MG: 10 INJECTION, EMULSION INTRAVENOUS at 13:37

## 2017-09-01 RX ADMIN — PROPOFOL 20 MG: 10 INJECTION, EMULSION INTRAVENOUS at 13:52

## 2017-09-01 RX ADMIN — PROPOFOL 20 MG: 10 INJECTION, EMULSION INTRAVENOUS at 13:40

## 2017-09-01 RX ADMIN — METRONIDAZOLE 500 MG: 500 INJECTION, SOLUTION INTRAVENOUS at 07:30

## 2017-09-01 RX ADMIN — PROPOFOL 20 MG: 10 INJECTION, EMULSION INTRAVENOUS at 13:35

## 2017-09-01 RX ADMIN — PROPOFOL 20 MG: 10 INJECTION, EMULSION INTRAVENOUS at 13:55

## 2017-09-01 RX ADMIN — DEXTROSE MONOHYDRATE 1 G: 5 INJECTION INTRAVENOUS at 08:59

## 2017-09-01 RX ADMIN — PROPOFOL 20 MG: 10 INJECTION, EMULSION INTRAVENOUS at 13:45

## 2017-09-01 RX ADMIN — PROPOFOL 20 MG: 10 INJECTION, EMULSION INTRAVENOUS at 13:39

## 2017-09-01 RX ADMIN — LEVOTHYROXINE SODIUM ANHYDROUS 25 MCG: 100 INJECTION, POWDER, LYOPHILIZED, FOR SOLUTION INTRAVENOUS at 05:24

## 2017-09-01 RX ADMIN — PROPOFOL 20 MG: 10 INJECTION, EMULSION INTRAVENOUS at 13:50

## 2017-09-01 RX ADMIN — DEXTROSE MONOHYDRATE 1 G: 5 INJECTION INTRAVENOUS at 00:26

## 2017-09-01 RX ADMIN — DEXTROSE MONOHYDRATE 1 G: 5 INJECTION INTRAVENOUS at 18:22

## 2017-09-01 RX ADMIN — PROPOFOL 90 MG: 10 INJECTION, EMULSION INTRAVENOUS at 13:32

## 2017-09-06 LAB
BLOOD CULTURE, ROUTINE: NORMAL
CULTURE, BLOOD 2: NORMAL

## 2017-09-21 ENCOUNTER — HOSPITAL ENCOUNTER (OUTPATIENT)
Dept: WOMENS IMAGING | Age: 82
Discharge: HOME | End: 2017-09-21
Payer: MEDICARE

## 2017-09-21 DIAGNOSIS — Z12.31 VISIT FOR SCREENING MAMMOGRAM: ICD-10-CM

## 2017-09-21 PROCEDURE — G0202 SCR MAMMO BI INCL CAD: HCPCS

## 2017-11-16 RX ORDER — ROSUVASTATIN CALCIUM 10 MG/1
TABLET, COATED ORAL
Qty: 30 TABLET | Refills: 5 | Status: SHIPPED | OUTPATIENT
Start: 2017-11-16 | End: 2018-07-18 | Stop reason: SDUPTHER

## 2018-01-22 RX ORDER — VALSARTAN 160 MG/1
160 TABLET ORAL NIGHTLY
Qty: 90 TABLET | Refills: 1 | Status: SHIPPED | OUTPATIENT
Start: 2018-01-22 | End: 2018-08-08

## 2018-02-19 RX ORDER — LEVOTHYROXINE SODIUM 0.05 MG/1
TABLET ORAL
Qty: 90 TABLET | Refills: 1 | Status: SHIPPED | OUTPATIENT
Start: 2018-02-19 | End: 2018-08-08 | Stop reason: SDUPTHER

## 2018-04-02 ENCOUNTER — OFFICE VISIT (OUTPATIENT)
Dept: INTERNAL MEDICINE CLINIC | Age: 83
End: 2018-04-02
Payer: MEDICARE

## 2018-04-02 VITALS
TEMPERATURE: 98.2 F | HEIGHT: 66 IN | SYSTOLIC BLOOD PRESSURE: 152 MMHG | HEART RATE: 85 BPM | DIASTOLIC BLOOD PRESSURE: 80 MMHG | WEIGHT: 200 LBS | OXYGEN SATURATION: 94 % | BODY MASS INDEX: 32.14 KG/M2

## 2018-04-02 DIAGNOSIS — R25.2 LEG CRAMPS: ICD-10-CM

## 2018-04-02 DIAGNOSIS — I10 HYPERTENSION, UNCONTROLLED: Primary | ICD-10-CM

## 2018-04-02 DIAGNOSIS — H68.003 SALPINGITIS OF BOTH EUSTACHIAN TUBES: ICD-10-CM

## 2018-04-02 DIAGNOSIS — J44.9 CHRONIC OBSTRUCTIVE PULMONARY DISEASE, UNSPECIFIED COPD TYPE (HCC): ICD-10-CM

## 2018-04-02 PROCEDURE — G8510 SCR DEP NEG, NO PLAN REQD: HCPCS | Performed by: INTERNAL MEDICINE

## 2018-04-02 PROCEDURE — 99213 OFFICE O/P EST LOW 20 MIN: CPT | Performed by: INTERNAL MEDICINE

## 2018-04-02 PROCEDURE — 4040F PNEUMOC VAC/ADMIN/RCVD: CPT | Performed by: INTERNAL MEDICINE

## 2018-04-02 PROCEDURE — 3288F FALL RISK ASSESSMENT DOCD: CPT | Performed by: INTERNAL MEDICINE

## 2018-04-02 PROCEDURE — 1090F PRES/ABSN URINE INCON ASSESS: CPT | Performed by: INTERNAL MEDICINE

## 2018-04-02 PROCEDURE — 1123F ACP DISCUSS/DSCN MKR DOCD: CPT | Performed by: INTERNAL MEDICINE

## 2018-04-02 PROCEDURE — G8427 DOCREV CUR MEDS BY ELIG CLIN: HCPCS | Performed by: INTERNAL MEDICINE

## 2018-04-02 PROCEDURE — G8926 SPIRO NO PERF OR DOC: HCPCS | Performed by: INTERNAL MEDICINE

## 2018-04-02 PROCEDURE — G8400 PT W/DXA NO RESULTS DOC: HCPCS | Performed by: INTERNAL MEDICINE

## 2018-04-02 PROCEDURE — 3023F SPIROM DOC REV: CPT | Performed by: INTERNAL MEDICINE

## 2018-04-02 PROCEDURE — 1036F TOBACCO NON-USER: CPT | Performed by: INTERNAL MEDICINE

## 2018-04-02 PROCEDURE — G8417 CALC BMI ABV UP PARAM F/U: HCPCS | Performed by: INTERNAL MEDICINE

## 2018-04-02 RX ORDER — AMLODIPINE BESYLATE 2.5 MG/1
2.5 TABLET ORAL
Qty: 30 TABLET | Refills: 3 | Status: SHIPPED | OUTPATIENT
Start: 2018-04-02 | End: 2018-07-25 | Stop reason: SDUPTHER

## 2018-04-02 ASSESSMENT — ENCOUNTER SYMPTOMS
EYE DISCHARGE: 0
ORTHOPNEA: 0
WHEEZING: 0
GASTROINTESTINAL NEGATIVE: 1
COUGH: 0
CONSTIPATION: 0
ABDOMINAL DISTENTION: 0
EYE ITCHING: 0
BACK PAIN: 1
CHOKING: 0
NAUSEA: 0
VOICE CHANGE: 0
SHORTNESS OF BREATH: 1

## 2018-04-02 ASSESSMENT — PATIENT HEALTH QUESTIONNAIRE - PHQ9
SUM OF ALL RESPONSES TO PHQ QUESTIONS 1-9: 0
2. FEELING DOWN, DEPRESSED OR HOPELESS: 0
1. LITTLE INTEREST OR PLEASURE IN DOING THINGS: 0
SUM OF ALL RESPONSES TO PHQ9 QUESTIONS 1 & 2: 0

## 2018-05-01 RX ORDER — PANTOPRAZOLE SODIUM 40 MG/1
40 TABLET, DELAYED RELEASE ORAL DAILY PRN
Qty: 90 TABLET | Refills: 0 | Status: SHIPPED | OUTPATIENT
Start: 2018-05-01 | End: 2018-07-25 | Stop reason: SDUPTHER

## 2018-07-19 RX ORDER — ROSUVASTATIN CALCIUM 10 MG/1
TABLET, COATED ORAL
Qty: 30 TABLET | Refills: 5 | Status: SHIPPED | OUTPATIENT
Start: 2018-07-19 | End: 2019-05-15 | Stop reason: SDUPTHER

## 2018-07-26 RX ORDER — AMLODIPINE BESYLATE 2.5 MG/1
2.5 TABLET ORAL
Qty: 90 TABLET | Refills: 1 | Status: SHIPPED | OUTPATIENT
Start: 2018-07-26 | End: 2018-11-19 | Stop reason: SDUPTHER

## 2018-07-26 RX ORDER — PANTOPRAZOLE SODIUM 40 MG/1
40 TABLET, DELAYED RELEASE ORAL DAILY PRN
Qty: 90 TABLET | Refills: 1 | Status: SHIPPED | OUTPATIENT
Start: 2018-07-26 | End: 2018-11-19 | Stop reason: SDUPTHER

## 2018-07-26 RX ORDER — SERTRALINE HYDROCHLORIDE 25 MG/1
25 TABLET, FILM COATED ORAL DAILY
Qty: 90 TABLET | Refills: 1 | Status: SHIPPED | OUTPATIENT
Start: 2018-07-26 | End: 2018-08-08 | Stop reason: SDUPTHER

## 2018-08-08 ENCOUNTER — OFFICE VISIT (OUTPATIENT)
Dept: INTERNAL MEDICINE CLINIC | Age: 83
End: 2018-08-08
Payer: MEDICARE

## 2018-08-08 VITALS
BODY MASS INDEX: 31.98 KG/M2 | WEIGHT: 199 LBS | HEIGHT: 66 IN | DIASTOLIC BLOOD PRESSURE: 80 MMHG | SYSTOLIC BLOOD PRESSURE: 110 MMHG | HEART RATE: 81 BPM | OXYGEN SATURATION: 95 % | TEMPERATURE: 98.1 F

## 2018-08-08 DIAGNOSIS — R73.9 HYPERGLYCEMIA: ICD-10-CM

## 2018-08-08 DIAGNOSIS — R53.83 FATIGUE, UNSPECIFIED TYPE: Primary | ICD-10-CM

## 2018-08-08 DIAGNOSIS — R53.83 FATIGUE, UNSPECIFIED TYPE: ICD-10-CM

## 2018-08-08 DIAGNOSIS — I10 ESSENTIAL HYPERTENSION: ICD-10-CM

## 2018-08-08 DIAGNOSIS — E03.9 ACQUIRED HYPOTHYROIDISM: ICD-10-CM

## 2018-08-08 DIAGNOSIS — E55.9 VITAMIN D DEFICIENCY: ICD-10-CM

## 2018-08-08 LAB
ALBUMIN SERPL-MCNC: 4.5 G/DL (ref 3.9–4.9)
ALP BLD-CCNC: 81 U/L (ref 40–130)
ALT SERPL-CCNC: 18 U/L (ref 0–33)
AMORPHOUS: ABNORMAL
ANION GAP SERPL CALCULATED.3IONS-SCNC: 14 MEQ/L (ref 7–13)
AST SERPL-CCNC: 20 U/L (ref 0–35)
BACTERIA: ABNORMAL /HPF
BILIRUB SERPL-MCNC: 0.4 MG/DL (ref 0–1.2)
BILIRUBIN URINE: NEGATIVE
BLOOD, URINE: NEGATIVE
BUN BLDV-MCNC: 15 MG/DL (ref 8–23)
CALCIUM SERPL-MCNC: 9.5 MG/DL (ref 8.6–10.2)
CHLORIDE BLD-SCNC: 96 MEQ/L (ref 98–107)
CLARITY: ABNORMAL
CO2: 27 MEQ/L (ref 22–29)
COLOR: ABNORMAL
CREAT SERPL-MCNC: 0.64 MG/DL (ref 0.5–0.9)
GFR AFRICAN AMERICAN: >60
GFR NON-AFRICAN AMERICAN: >60
GLOBULIN: 3 G/DL (ref 2.3–3.5)
GLUCOSE BLD-MCNC: 86 MG/DL (ref 74–109)
GLUCOSE URINE: NEGATIVE MG/DL
HBA1C MFR BLD: 5.8 % (ref 4.8–5.9)
HCT VFR BLD CALC: 43.1 % (ref 37–47)
HEMOGLOBIN: 14.6 G/DL (ref 12–16)
KETONES, URINE: ABNORMAL MG/DL
LEUKOCYTE ESTERASE, URINE: ABNORMAL
MCH RBC QN AUTO: 30.8 PG (ref 27–31.3)
MCHC RBC AUTO-ENTMCNC: 33.9 % (ref 33–37)
MCV RBC AUTO: 90.6 FL (ref 82–100)
NITRITE, URINE: NEGATIVE
PDW BLD-RTO: 13.9 % (ref 11.5–14.5)
PH UA: 5 (ref 5–9)
PLATELET # BLD: 201 K/UL (ref 130–400)
POTASSIUM SERPL-SCNC: 4.2 MEQ/L (ref 3.5–5.1)
PROTEIN UA: NEGATIVE MG/DL
RBC # BLD: 4.76 M/UL (ref 4.2–5.4)
RBC UA: ABNORMAL /HPF (ref 0–2)
SODIUM BLD-SCNC: 137 MEQ/L (ref 132–144)
SPECIFIC GRAVITY UA: 1.03 (ref 1–1.03)
TOTAL PROTEIN: 7.5 G/DL (ref 6.4–8.1)
TSH SERPL DL<=0.05 MIU/L-ACNC: 1.8 UIU/ML (ref 0.27–4.2)
UROBILINOGEN, URINE: 1 E.U./DL
VITAMIN D 25-HYDROXY: 38.5 NG/ML (ref 30–100)
WBC # BLD: 6.7 K/UL (ref 4.8–10.8)
WBC UA: ABNORMAL /HPF (ref 0–5)
YEAST: PRESENT

## 2018-08-08 PROCEDURE — G8417 CALC BMI ABV UP PARAM F/U: HCPCS | Performed by: INTERNAL MEDICINE

## 2018-08-08 PROCEDURE — 1090F PRES/ABSN URINE INCON ASSESS: CPT | Performed by: INTERNAL MEDICINE

## 2018-08-08 PROCEDURE — 1101F PT FALLS ASSESS-DOCD LE1/YR: CPT | Performed by: INTERNAL MEDICINE

## 2018-08-08 PROCEDURE — 4040F PNEUMOC VAC/ADMIN/RCVD: CPT | Performed by: INTERNAL MEDICINE

## 2018-08-08 PROCEDURE — 1036F TOBACCO NON-USER: CPT | Performed by: INTERNAL MEDICINE

## 2018-08-08 PROCEDURE — G8400 PT W/DXA NO RESULTS DOC: HCPCS | Performed by: INTERNAL MEDICINE

## 2018-08-08 PROCEDURE — G8427 DOCREV CUR MEDS BY ELIG CLIN: HCPCS | Performed by: INTERNAL MEDICINE

## 2018-08-08 PROCEDURE — 99214 OFFICE O/P EST MOD 30 MIN: CPT | Performed by: INTERNAL MEDICINE

## 2018-08-08 PROCEDURE — 1123F ACP DISCUSS/DSCN MKR DOCD: CPT | Performed by: INTERNAL MEDICINE

## 2018-08-08 RX ORDER — LEVOTHYROXINE SODIUM 0.05 MG/1
TABLET ORAL
Qty: 90 TABLET | Refills: 1 | Status: SHIPPED | OUTPATIENT
Start: 2018-08-08 | End: 2018-11-19 | Stop reason: SDUPTHER

## 2018-08-08 RX ORDER — LOSARTAN POTASSIUM 100 MG/1
100 TABLET ORAL DAILY
Qty: 90 TABLET | Refills: 1 | Status: SHIPPED | OUTPATIENT
Start: 2018-08-08 | End: 2018-11-19 | Stop reason: SDUPTHER

## 2018-08-08 ASSESSMENT — ENCOUNTER SYMPTOMS
COUGH: 0
WHEEZING: 0
ORTHOPNEA: 0
NAUSEA: 0
SHORTNESS OF BREATH: 1
CHOKING: 0
EYE ITCHING: 0
CONSTIPATION: 0
ABDOMINAL DISTENTION: 0
CHANGE IN BOWEL HABIT: 0
VOICE CHANGE: 0
ABDOMINAL PAIN: 1
BACK PAIN: 1
EYE DISCHARGE: 0

## 2018-08-08 NOTE — PROGRESS NOTES
09/01/2017 >60.0  >60 Final    Comment: >60 mL/min/1.73m2 EGFR, calc. for ages 25 and older using the  MDRD formula (not corrected for weight), is valid for stable  renal function.  GFR  09/01/2017 >60.0  >60 Final    Comment: >60 mL/min/1.73m2 EGFR, calc. for ages 25 and older using the  MDRD formula (not corrected for weight), is valid for stable  renal function.  Calcium 09/01/2017 8.4* 8.6 - 10.2 mg/dL Final    POC Creatinine 08/31/2017 0.6  0.6 - 1.2 mg/dL Final    GFR Non- 08/31/2017 >60  >60 Final    Comment: >60 mL/min/1.73m2 EGFR, calc. for ages 25 and older using the  MDRD formula (not corrected for weight), is valid for stable  renal function.  GFR  08/31/2017 >60  >60 Final    Comment: >60 mL/min/1.73m2 EGFR, calc. for ages 25 and older using the  MDRD formula (not corrected for weight), is valid for stable  renal function.  Sample Type 08/31/2017 DL   Final    Performed on 08/31/2017 SEE BELOW   Final    Performed on POC       HPI:    Hypertension   This is a chronic problem. The current episode started more than 1 year ago. The problem has been waxing and waning since onset. The problem is controlled. Associated symptoms include shortness of breath (exertional, occasional). Pertinent negatives include no anxiety, chest pain, headaches, malaise/fatigue, neck pain, orthopnea, palpitations, peripheral edema or PND. Agents associated with hypertension include thyroid hormones. Risk factors for coronary artery disease include sedentary lifestyle, obesity, post-menopausal state and stress. Past treatments include angiotensin blockers and diuretics. The current treatment provides moderate improvement. Compliance problems include diet and exercise. There is no history of angina, CAD/MI, CVA or PVD. Identifiable causes of hypertension include sleep apnea. There is no history of chronic renal disease or renovascular disease.    Urinary EVERY DAY 30 tablet 5    docusate sodium (COLACE) 100 MG capsule Take 100 mg by mouth every morning (before breakfast)      acetaminophen (TYLENOL) 500 MG tablet Take 500 mg by mouth every 6 hours as needed for Pain (1- 2 tabs in am) Indications: for pain      vitamin B-12 (CYANOCOBALAMIN) 1000 MCG tablet Take 1,000 mcg by mouth daily Indications: in am.      Cholecalciferol (VITAMIN D3) 2000 units CAPS Take 1,000 Units by mouth Daily with supper      fluticasone (FLONASE) 50 MCG/ACT nasal spray INHALE 2 sprays by Nasal route daily. 1 Bottle 3    Multiple Vitamins-Minerals (MULTIVITAMIN PO) Take by mouth daily      fexofenadine (ALLEGRA) 180 MG tablet Take 1 tablet by mouth daily as needed (postnasal drip). 30 tablet 3    nitroGLYCERIN (NITROSTAT) 0.4 MG SL tablet Place 1 tablet under the tongue every 5 minutes as needed for Chest pain. 25 tablet 6    OXYGEN Inhale 3 L into the lungs continuous.  aspirin 325 MG tablet Take 325 mg by mouth daily. No current facility-administered medications on file prior to visit. Review of Systems   Constitutional: Positive for fatigue. Negative for activity change, appetite change, diaphoresis, fever, malaise/fatigue and unexpected weight change. HENT: Positive for ear pain. Negative for congestion, ear discharge, hearing loss, nosebleeds and voice change. Eyes: Negative for discharge and itching. Respiratory: Positive for shortness of breath (exertional, occasional). Negative for cough, choking and wheezing. Cardiovascular: Negative for chest pain, palpitations, orthopnea, leg swelling and PND. Gastrointestinal: Positive for abdominal pain (occasional, brief, sharp, upper right side). Negative for abdominal distention, change in bowel habit, constipation and nausea. Endocrine: Negative for cold intolerance, heat intolerance, polydipsia and polyuria. Genitourinary: Positive for frequency and urgency.  Negative for dysuria, flank pain, hematuria, hesitancy and pelvic pain. Musculoskeletal: Positive for back pain and myalgias (bilateral leg cramps at night). Negative for joint swelling, neck pain and neck stiffness. Skin: Negative for rash. Allergic/Immunologic: Negative for immunocompromised state. Neurological: Positive for dizziness (occasional, brief episodes while walking). Negative for tremors, speech difficulty, weakness, light-headedness and headaches. Hematological: Does not bruise/bleed easily. Psychiatric/Behavioral: Positive for dysphoric mood. Negative for confusion and decreased concentration. The patient is not nervous/anxious. Vitals:    08/08/18 1205   BP: 110/80   Site: Right Arm   Position: Sitting   Cuff Size: Medium Adult   Pulse: 81   Temp: 98.1 °F (36.7 °C)   TempSrc: Tympanic   SpO2: 95%   Weight: 199 lb (90.3 kg)   Height: 5' 6\" (1.676 m)       Physical Exam   Constitutional: She is oriented to person, place, and time. No distress. Obese, age appropriate, well groomed     HENT:   Head: Atraumatic. Right Ear: Hearing, tympanic membrane and ear canal normal. No middle ear effusion. Left Ear: Hearing, tympanic membrane and ear canal normal.  No middle ear effusion. Nose: No mucosal edema or rhinorrhea. Eyes: Conjunctivae and EOM are normal.   Neck: Neck supple. No JVD present. No thyromegaly present. Cardiovascular: Regular rhythm, normal heart sounds and intact distal pulses. Exam reveals no gallop. Pulmonary/Chest: Effort normal and breath sounds normal. She has no rales. Abdominal: Soft. Bowel sounds are normal. She exhibits no distension. There is no hepatosplenomegaly or hepatomegaly. There is tenderness (mild, RUQ). There is no rigidity, no rebound, no guarding, no CVA tenderness and negative Goldsmith's sign. No hernia. Exam limited due to abdominal adiposity      Musculoskeletal: Normal range of motion. Lymphadenopathy:     She has no cervical adenopathy.    Neurological: She is alert and oriented to person, place, and time. Coordination normal.   Walks slowly, cane assisted due to knee and right hip pain, no focal neurological deficits   Skin: Skin is warm and dry. No rash noted. Psychiatric: She has a normal mood and affect. Her speech is normal and behavior is normal. Judgment normal. Her mood appears not anxious. She is not slowed and not withdrawn. Cognition and memory are normal. She does not express inappropriate judgment. She does not exhibit a depressed mood. She exhibits normal recent memory and normal remote memory. no signs or symptoms of cognitive decline She is attentive. Assessment:    Xiomara Benites was seen today for hypertension, urinary frequency and fatigue. Diagnoses and all orders for this visit:    Fatigue, unspecified type  -     CBC; Future  -     Comprehensive Metabolic Panel; Future  -     TSH without Reflex; Future  -     Urinalysis; Future    Essential hypertension              Stable on current medication, discussed the substitution losartan versus valsartan, to keep monitoring, focus on lifestyle especially weight management    Acquired hypothyroidism              Due to recheck TSH, may contribute to chronic fatigue  -     TSH without Reflex; Future    Vitamin D deficiency              History of very low vitamin D in 2014, recheck level, may contribute to fatigue  -     Vitamin D 25 Hydroxy; Future    Hyperglycemia              2 hour postprandial today in the office setting, rule out new onset diabetes  -     Hemoglobin A1C; Future      Last but not least, sertraline dose increased to 50 mg daily due to 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 See also orders and comments in the assessment section. Today's diagnosis (in the context of chronic problems) was discussed with patient (/and caregiver if present), questions answered. Patient counseled and encouraged re: daily effort to improve diet (a high fiber, low calorie, low sodium and caffeine diet, divided into 3 main meals daily) and exercise habits (more aerobic exercise as tolerated), better stress management, will result in improved health and help in better management of the current chronic conditions in the long run. Side effects, adverse effects of the medication prescribed (or refilled) today, treatment plan/ rationale and result expectations have (again) been discussed with the patient who expresses understanding and consents to proceed as outlined above. Additional advise included in the \"after visit summary\". Orders Placed This Encounter   Medications    levothyroxine (SYNTHROID) 50 MCG tablet     Sig: TAKE 1 TABLET BY MOUTH EVERY DAY     Dispense:  90 tablet     Refill:  1    losartan (COZAAR) 100 MG tablet     Sig: Take 1 tablet by mouth daily     Dispense:  90 tablet     Refill:  1    sertraline (ZOLOFT) 50 MG tablet     Sig: Take 1 tablet by mouth daily     Dispense:  90 tablet     Refill:  0       Orders Placed This Encounter   Procedures    CBC     Standing Status:   Future     Number of Occurrences:   1     Standing Expiration Date:   8/8/2019    Comprehensive Metabolic Panel     Standing Status:   Future     Number of Occurrences:   1     Standing Expiration Date:   8/8/2019    TSH without Reflex     Standing Status:   Future     Number of Occurrences:   1     Standing Expiration Date:   8/8/2019    Urinalysis     Standing Status:   Future     Number of Occurrences:   1     Standing Expiration Date:   8/8/2019     Order Specific Question:   SPECIFY(EX-CATH,MIDSTREAM,CYSTO,ETC)?      Answer:   Galo Trejo

## 2018-11-19 RX ORDER — LOSARTAN POTASSIUM 100 MG/1
100 TABLET ORAL DAILY
Qty: 90 TABLET | Refills: 1 | Status: SHIPPED | OUTPATIENT
Start: 2018-11-19 | End: 2018-12-17

## 2018-11-19 RX ORDER — AMLODIPINE BESYLATE 2.5 MG/1
2.5 TABLET ORAL
Qty: 90 TABLET | Refills: 1 | Status: SHIPPED | OUTPATIENT
Start: 2018-11-19 | End: 2019-03-18 | Stop reason: SDUPTHER

## 2018-11-19 RX ORDER — PANTOPRAZOLE SODIUM 40 MG/1
40 TABLET, DELAYED RELEASE ORAL DAILY PRN
Qty: 90 TABLET | Refills: 1 | Status: SHIPPED | OUTPATIENT
Start: 2018-11-19 | End: 2019-05-21 | Stop reason: SDUPTHER

## 2018-11-19 RX ORDER — LEVOTHYROXINE SODIUM 0.05 MG/1
TABLET ORAL
Qty: 90 TABLET | Refills: 1 | Status: SHIPPED | OUTPATIENT
Start: 2018-11-19 | End: 2019-05-21 | Stop reason: SDUPTHER

## 2018-12-02 ENCOUNTER — CARE COORDINATION (OUTPATIENT)
Dept: CARE COORDINATION | Age: 83
End: 2018-12-02

## 2018-12-12 ENCOUNTER — CARE COORDINATION (OUTPATIENT)
Dept: CARE COORDINATION | Age: 83
End: 2018-12-12

## 2018-12-17 ENCOUNTER — OFFICE VISIT (OUTPATIENT)
Dept: INTERNAL MEDICINE CLINIC | Age: 83
End: 2018-12-17
Payer: MEDICARE

## 2018-12-17 VITALS
HEIGHT: 66 IN | HEART RATE: 56 BPM | OXYGEN SATURATION: 96 % | BODY MASS INDEX: 32.14 KG/M2 | WEIGHT: 200 LBS | DIASTOLIC BLOOD PRESSURE: 110 MMHG | TEMPERATURE: 98.4 F | SYSTOLIC BLOOD PRESSURE: 160 MMHG

## 2018-12-17 DIAGNOSIS — M19.031 OSTEOARTHRITIS OF BOTH WRISTS, UNSPECIFIED OSTEOARTHRITIS TYPE: ICD-10-CM

## 2018-12-17 DIAGNOSIS — I10 UNCONTROLLED HYPERTENSION: Primary | ICD-10-CM

## 2018-12-17 DIAGNOSIS — M19.032 OSTEOARTHRITIS OF BOTH WRISTS, UNSPECIFIED OSTEOARTHRITIS TYPE: ICD-10-CM

## 2018-12-17 DIAGNOSIS — Z23 NEED FOR INFLUENZA VACCINATION: ICD-10-CM

## 2018-12-17 PROCEDURE — 1101F PT FALLS ASSESS-DOCD LE1/YR: CPT | Performed by: INTERNAL MEDICINE

## 2018-12-17 PROCEDURE — 99213 OFFICE O/P EST LOW 20 MIN: CPT | Performed by: INTERNAL MEDICINE

## 2018-12-17 PROCEDURE — 1090F PRES/ABSN URINE INCON ASSESS: CPT | Performed by: INTERNAL MEDICINE

## 2018-12-17 PROCEDURE — G0008 ADMIN INFLUENZA VIRUS VAC: HCPCS | Performed by: INTERNAL MEDICINE

## 2018-12-17 PROCEDURE — 90662 IIV NO PRSV INCREASED AG IM: CPT | Performed by: INTERNAL MEDICINE

## 2018-12-17 PROCEDURE — G8427 DOCREV CUR MEDS BY ELIG CLIN: HCPCS | Performed by: INTERNAL MEDICINE

## 2018-12-17 PROCEDURE — G8417 CALC BMI ABV UP PARAM F/U: HCPCS | Performed by: INTERNAL MEDICINE

## 2018-12-17 PROCEDURE — 4040F PNEUMOC VAC/ADMIN/RCVD: CPT | Performed by: INTERNAL MEDICINE

## 2018-12-17 PROCEDURE — 1036F TOBACCO NON-USER: CPT | Performed by: INTERNAL MEDICINE

## 2018-12-17 PROCEDURE — G8482 FLU IMMUNIZE ORDER/ADMIN: HCPCS | Performed by: INTERNAL MEDICINE

## 2018-12-17 PROCEDURE — 1123F ACP DISCUSS/DSCN MKR DOCD: CPT | Performed by: INTERNAL MEDICINE

## 2018-12-17 PROCEDURE — G8400 PT W/DXA NO RESULTS DOC: HCPCS | Performed by: INTERNAL MEDICINE

## 2018-12-17 RX ORDER — VALSARTAN 160 MG/1
160 TABLET ORAL DAILY
Qty: 90 TABLET | Refills: 1 | Status: SHIPPED | OUTPATIENT
Start: 2018-12-17 | End: 2019-06-18 | Stop reason: SDUPTHER

## 2018-12-17 RX ORDER — FLUTICASONE PROPIONATE 50 MCG
SPRAY, SUSPENSION (ML) NASAL
Qty: 1 BOTTLE | Refills: 3 | Status: SHIPPED | OUTPATIENT
Start: 2018-12-17 | End: 2019-08-19 | Stop reason: SDUPTHER

## 2018-12-17 ASSESSMENT — ENCOUNTER SYMPTOMS
CONSTIPATION: 0
WHEEZING: 0
NAUSEA: 0
TROUBLE SWALLOWING: 0
CHOKING: 0
SHORTNESS OF BREATH: 1
ABDOMINAL DISTENTION: 0
COUGH: 0
ORTHOPNEA: 0
EYE PAIN: 0
BACK PAIN: 1

## 2018-12-17 NOTE — PROGRESS NOTES
Mother         dec age 71, rheumatic       Family and socialhistory were reviewed and pertinent changes documented. ALLERGIES    Ciprofloxacin hcl; Lipitor [atorvastatin]; and Zetia [ezetimibe]    Current Outpatient Prescriptions on File Prior to Visit   Medication Sig Dispense Refill    pantoprazole (PROTONIX) 40 MG tablet TAKE 1 TABLET BY MOUTH DAILY AS NEEDED (HEARTBURN) 90 tablet 1    amLODIPine (NORVASC) 2.5 MG tablet TAKE 1 TABLET BY MOUTH DAILY (before lunch) 90 tablet 1    levothyroxine (SYNTHROID) 50 MCG tablet TAKE 1 TABLET BY MOUTH EVERY DAY 90 tablet 1    sertraline (ZOLOFT) 50 MG tablet Take 1 tablet by mouth daily 90 tablet 1    rosuvastatin (CRESTOR) 10 MG tablet TAKE 1 TABLET BY MOUTH EVERY DAY 30 tablet 5    docusate sodium (COLACE) 100 MG capsule Take 100 mg by mouth every morning (before breakfast)      acetaminophen (TYLENOL) 500 MG tablet Take 500 mg by mouth every 6 hours as needed for Pain (1- 2 tabs in am) Indications: for pain      vitamin B-12 (CYANOCOBALAMIN) 1000 MCG tablet Take 1,000 mcg by mouth daily Indications: in am.      Cholecalciferol (VITAMIN D3) 2000 units CAPS Take 1,000 Units by mouth Daily with supper      Multiple Vitamins-Minerals (MULTIVITAMIN PO) Take by mouth daily      fexofenadine (ALLEGRA) 180 MG tablet Take 1 tablet by mouth daily as needed (postnasal drip). 30 tablet 3    nitroGLYCERIN (NITROSTAT) 0.4 MG SL tablet Place 1 tablet under the tongue every 5 minutes as needed for Chest pain. 25 tablet 6    OXYGEN Inhale 3 L into the lungs continuous.  aspirin 325 MG tablet Take 325 mg by mouth daily. No current facility-administered medications on file prior to visit. Review of Systems   Constitutional: Negative for activity change, appetite change, malaise/fatigue and unexpected weight change. HENT: Negative for congestion, hearing loss, nosebleeds and trouble swallowing. Eyes: Negative for pain and visual disturbance. caregiver if present): current health status, medications, activities and diet. See also orders and comments in the assessment section. Today's diagnosis (in the context ofchronic problems) was discussed with patient (/and caregiver if present), questions answered. Side effects, adverse effects of the medication prescribed (or refilled) today, treatment plan/ rationale and result expectations have (again) been discussed with the patient who expresses understanding and consents to proceed as outlined above. Additional advise included in the \"after visit summary\". Orders Placed This Encounter   Medications    fluticasone (FLONASE) 50 MCG/ACT nasal spray     Sig: INHALE 2 sprays by Nasal route daily. Dispense:  1 Bottle     Refill:  3    valsartan (DIOVAN) 160 MG tablet     Sig: Take 1 tablet by mouth daily     Dispense:  90 tablet     Refill:  1     Further workup and plan will be determined based on clinical progression and follow up test/ treatment results. Orders Placed This Encounter   Procedures    INFLUENZA, HIGH DOSE, 65 YRS +, IM, PF, PREFILL SYR, 0.5ML (FLUZONE HD)       Close follow up needed to evaluate treatment results and for care coordination. Return in about 3 months (around 3/17/2019). I have reviewed the patient's medical and surgical, family and social history, health maintenance schedule, and updated the computerized patient record. Please note this report has been partially produced by using speech recognition hardware. It may contain errors related to the system, including grammar, punctuation and spelling as well as words and phrases that may seem inaccurate. For anyquestions or concerns, please feel free to contact me for clarification.         Electronically signed by Mallory Holguin MD

## 2018-12-17 NOTE — PATIENT INSTRUCTIONS
your affected hand, point your fingers and thumb straight up. Your wrist should be relaxed, following the line of your fingers and thumb. 3. Curl your fingers so that the top two joints in them are bent, and your fingers wrap down. Your fingertips should touch or be near the base of your fingers. Your fingers will look like a hook. 4. Make a fist by bending your knuckles. Your thumb can gently rest against your index (pointing) finger. 5. Unwind your fingers slightly so that your fingertips can touch the base of your palm. Your thumb can rest against your index finger. 6. Move back to your starting position, with your fingers and thumb pointing up. 7. Repeat the series of motions 8 to 12 times. 8. Switch hands and repeat steps 1 through 6, even if only one hand is sore. Intrinsic flexion    1. Rest your affected hand on a table and bend the large joints where your fingers connect to your hand. Keep your thumb and the other joints in your fingers straight. 2. Slowly straighten your fingers. Your wrist should be relaxed, following the line of your fingers and thumb. 3. Move back to your starting position, with your hand bent. 4. Repeat 8 to 12 times. 5. Switch hands and repeat steps 1 through 4, even if only one hand is sore. Finger extension    1. Place your affected hand flat on a table. 2. Lift and then lower one finger at a time off the table. 3. Repeat 8 to 12 times. 4. Switch hands and repeat steps 1 through 3, even if only one hand is sore. MP extension    1. Place your good hand on a table, palm up. Put your affected hand on top of your good hand with your fingers wrapped around the thumb of your good hand like you are making a fist.  2. Slowly uncurl the joints of your affected hand where your fingers connect to your hand so that only the top two joints of your fingers are bent. Your fingers will look like a hook.   3. Move back to your starting position, with your fingers wrapped around your good thumb. 4. Repeat 8 to 12 times. 5. Switch hands and repeat steps 1 through 4, even if only one hand is sore. PIP extension (with MP extension)    1. Place your good hand on a table, palm up. Put your affected hand on top of your good hand, palm up. 2. Use the thumb and fingers of your good hand to grasp below the middle joint of one finger of your affected hand. 3. Straighten the last two joints of that finger. 4. Repeat 8 to 12 times. 5. Repeat steps 1 through 4 with each finger. 6. Switch hands and repeat steps 1 through 5, even if only one hand is sore. DIP flexion    1. With your good hand, grasp one finger of your affected hand. Your thumb will be on the top side of your finger just below the joint that is closest to your fingernail. 2. Slowly bend your affected finger only at the joint closest to your fingernail. 3. Repeat 8 to 12 times. 4. Repeat steps 1 through 3 with each finger. 5. Switch hands and repeat steps 1 through 4, even if only one hand is sore. Follow-up care is a key part of your treatment and safety. Be sure to make and go to all appointments, and call your doctor if you are having problems. It's also a good idea to know your test results and keep a list of the medicines you take. Where can you learn more? Go to [email or web address] and sign in to your [employer] account. Enter Z617 in the [facility] box to learn more about \"Hand Arthritis: Exercises. \"     If you do not have an account, please click on the \"Sign Up Now\" link. Current as of: November 29, 2017  Content Version: 11.8  © 1934-7543 [employer]. Care instructions adapted under license by [facility] ([facility]). If you have questions about a medical condition or this instruction, always ask your healthcare professional. [address] any warranty or liability for your use of this information.

## 2019-03-18 ENCOUNTER — OFFICE VISIT (OUTPATIENT)
Dept: INTERNAL MEDICINE CLINIC | Age: 84
End: 2019-03-18
Payer: MEDICARE

## 2019-03-18 VITALS
OXYGEN SATURATION: 93 % | DIASTOLIC BLOOD PRESSURE: 100 MMHG | BODY MASS INDEX: 32.47 KG/M2 | TEMPERATURE: 98.2 F | WEIGHT: 202 LBS | HEART RATE: 83 BPM | HEIGHT: 66 IN | SYSTOLIC BLOOD PRESSURE: 146 MMHG

## 2019-03-18 DIAGNOSIS — I10 UNCONTROLLED HYPERTENSION: ICD-10-CM

## 2019-03-18 DIAGNOSIS — H81.10 BENIGN PAROXYSMAL POSITIONAL VERTIGO, UNSPECIFIED LATERALITY: Primary | ICD-10-CM

## 2019-03-18 PROCEDURE — 1036F TOBACCO NON-USER: CPT | Performed by: INTERNAL MEDICINE

## 2019-03-18 PROCEDURE — 99213 OFFICE O/P EST LOW 20 MIN: CPT | Performed by: INTERNAL MEDICINE

## 2019-03-18 PROCEDURE — G8417 CALC BMI ABV UP PARAM F/U: HCPCS | Performed by: INTERNAL MEDICINE

## 2019-03-18 PROCEDURE — G8427 DOCREV CUR MEDS BY ELIG CLIN: HCPCS | Performed by: INTERNAL MEDICINE

## 2019-03-18 PROCEDURE — 1123F ACP DISCUSS/DSCN MKR DOCD: CPT | Performed by: INTERNAL MEDICINE

## 2019-03-18 PROCEDURE — G8482 FLU IMMUNIZE ORDER/ADMIN: HCPCS | Performed by: INTERNAL MEDICINE

## 2019-03-18 PROCEDURE — 4040F PNEUMOC VAC/ADMIN/RCVD: CPT | Performed by: INTERNAL MEDICINE

## 2019-03-18 PROCEDURE — 1090F PRES/ABSN URINE INCON ASSESS: CPT | Performed by: INTERNAL MEDICINE

## 2019-03-18 PROCEDURE — G8400 PT W/DXA NO RESULTS DOC: HCPCS | Performed by: INTERNAL MEDICINE

## 2019-03-18 RX ORDER — HYDROCHLOROTHIAZIDE 12.5 MG/1
12.5 CAPSULE, GELATIN COATED ORAL DAILY
Qty: 30 CAPSULE | Refills: 3 | Status: SHIPPED | OUTPATIENT
Start: 2019-03-18 | End: 2019-06-18 | Stop reason: SDUPTHER

## 2019-03-18 RX ORDER — AMLODIPINE BESYLATE 2.5 MG/1
2.5 TABLET ORAL
Qty: 90 TABLET | Refills: 1 | Status: SHIPPED | OUTPATIENT
Start: 2019-03-18 | End: 2019-11-19 | Stop reason: SDUPTHER

## 2019-03-18 ASSESSMENT — PATIENT HEALTH QUESTIONNAIRE - PHQ9
SUM OF ALL RESPONSES TO PHQ QUESTIONS 1-9: 0
2. FEELING DOWN, DEPRESSED OR HOPELESS: 0
1. LITTLE INTEREST OR PLEASURE IN DOING THINGS: 0
SUM OF ALL RESPONSES TO PHQ QUESTIONS 1-9: 0
SUM OF ALL RESPONSES TO PHQ9 QUESTIONS 1 & 2: 0

## 2019-03-18 NOTE — PATIENT INSTRUCTIONS
Patient Education        Low Sodium Diet (2,000 Milligram): Care Instructions  Your Care Instructions    Too much sodium causes your body to hold on to extra water. This can raise your blood pressure and force your heart and kidneys to work harder. In very serious cases, this could cause you to be put in the hospital. It might even be life-threatening. By limiting sodium, you will feel better and lower your risk of serious problems. The most common source of sodium is salt. People get most of the salt in their diet from canned, prepared, and packaged foods. Fast food and restaurant meals also are very high in sodium. Your doctor will probably limit your sodium to less than 2,000 milligrams (mg) a day. This limit counts all the sodium in prepared and packaged foods and any salt you add to your food. Follow-up care is a key part of your treatment and safety. Be sure to make and go to all appointments, and call your doctor if you are having problems. It's also a good idea to know your test results and keep a list of the medicines you take. How can you care for yourself at home? Read food labels  · Read labels on cans and food packages. The labels tell you how much sodium is in each serving. Make sure that you look at the serving size. If you eat more than the serving size, you have eaten more sodium. · Food labels also tell you the Percent Daily Value for sodium. Choose products with low Percent Daily Values for sodium. · Be aware that sodium can come in forms other than salt, including monosodium glutamate (MSG), sodium citrate, and sodium bicarbonate (baking soda). MSG is often added to Asian food. When you eat out, you can sometimes ask for food without MSG or added salt. Buy low-sodium foods  · Buy foods that are labeled \"unsalted\" (no salt added), \"sodium-free\" (less than 5 mg of sodium per serving), or \"low-sodium\" (less than 140 mg of sodium per serving).  Foods labeled \"reduced-sodium\" and \"light sodium\" may still have too much sodium. Be sure to read the label to see how much sodium you are getting. · Buy fresh vegetables, or frozen vegetables without added sauces. Buy low-sodium versions of canned vegetables, soups, and other canned goods. Prepare low-sodium meals  · Cut back on the amount of salt you use in cooking. This will help you adjust to the taste. Do not add salt after cooking. One teaspoon of salt has about 2,300 mg of sodium. · Take the salt shaker off the table. · Flavor your food with garlic, lemon juice, onion, vinegar, herbs, and spices. Do not use soy sauce, lite soy sauce, steak sauce, onion salt, garlic salt, celery salt, mustard, or ketchup on your food. · Use low-sodium salad dressings, sauces, and ketchup. Or make your own salad dressings and sauces without adding salt. · Use less salt (or none) when recipes call for it. You can often use half the salt a recipe calls for without losing flavor. Other foods such as rice, pasta, and grains do not need added salt. · Rinse canned vegetables, and cook them in fresh water. This removes some--but not all--of the salt. · Avoid water that is naturally high in sodium or that has been treated with water softeners, which add sodium. Call your local water company to find out the sodium content of your water supply. If you buy bottled water, read the label and choose a sodium-free brand. Avoid high-sodium foods  · Avoid eating:  ? Smoked, cured, salted, and canned meat, fish, and poultry. ? Ham, zapata, hot dogs, and luncheon meats. ? Regular, hard, and processed cheese and regular peanut butter. ? Crackers with salted tops, and other salted snack foods such as pretzels, chips, and salted popcorn. ? Frozen prepared meals, unless labeled low-sodium. ? Canned and dried soups, broths, and bouillon, unless labeled sodium-free or low-sodium. ? Canned vegetables, unless labeled sodium-free or low-sodium. ?  [place] fries, pizza, tacos, and other fast foods. ? Pickles, olives, ketchup, and other condiments, especially soy sauce, unless labeled sodium-free or low-sodium. Where can you learn more? Go to https://Itibia Technologiessmiley.Scriptick. org and sign in to your [employer] account. Enter U129 in the [facility] box to learn more about \"Low Sodium Diet (2,000 Milligram): Care Instructions. \"     If you do not have an account, please click on the \"Sign Up Now\" link. Current as of: March 28, 2018  Content Version: 11.9  © 7327-9647 [employer]. Care instructions adapted under license by [facility] ([facility]). If you have questions about a medical condition or this instruction, always ask your healthcare professional. [address] any warranty or liability for your use of this information. Patient Education        DASH Diet: Care Instructions  Your Care Instructions    The DASH diet is an eating plan that can help lower your blood pressure. DASH stands for Dietary Approaches to Stop Hypertension. Hypertension is high blood pressure. The DASH diet focuses on eating foods that are high in calcium, potassium, and magnesium. These nutrients can lower blood pressure. The foods that are highest in these nutrients are fruits, vegetables, low-fat dairy products, nuts, seeds, and legumes. But taking calcium, potassium, and magnesium supplements instead of eating foods that are high in those nutrients does not have the same effect. The DASH diet also includes whole grains, fish, and poultry. The DASH diet is one of several lifestyle changes your doctor may recommend to lower your high blood pressure. Your doctor may also want you to decrease the amount of sodium in your diet. Lowering sodium while following the DASH diet can lower blood pressure even further than just the DASH diet alone. Follow-up care is a key part of your treatment and safety.  Be sure to make and go to all appointments, and call your doctor if you are having problems. It's also a good idea to know your test results and keep a list of the medicines you take. How can you care for yourself at home? Following the DASH diet  · Eat 4 to 5 servings of fruit each day. A serving is 1 medium-sized piece of fruit, ½ cup chopped or canned fruit, 1/4 cup dried fruit, or 4 ounces (½ cup) of fruit juice. Choose fruit more often than fruit juice. · Eat 4 to 5 servings of vegetables each day. A serving is 1 cup of lettuce or raw leafy vegetables, ½ cup of chopped or cooked vegetables, or 4 ounces (½ cup) of vegetable juice. Choose vegetables more often than vegetable juice. · Get 2 to 3 servings of low-fat and fat-free dairy each day. A serving is 8 ounces of milk, 1 cup of yogurt, or 1 ½ ounces of cheese. · Eat 6 to 8 servings of grains each day. A serving is 1 slice of bread, 1 ounce of dry cereal, or ½ cup of cooked rice, pasta, or cooked cereal. Try to choose whole-grain products as much as possible. · Limit lean meat, poultry, and fish to 2 servings each day. A serving is 3 ounces, about the size of a deck of cards. · Eat 4 to 5 servings of nuts, seeds, and legumes (cooked dried beans, lentils, and split peas) each week. A serving is 1/3 cup of nuts, 2 tablespoons of seeds, or ½ cup of cooked beans or peas. · Limit fats and oils to 2 to 3 servings each day. A serving is 1 teaspoon of vegetable oil or 2 tablespoons of salad dressing. · Limit sweets and added sugars to 5 servings or less a week. A serving is 1 tablespoon jelly or jam, ½ cup sorbet, or 1 cup of lemonade. · Eat less than 2,300 milligrams (mg) of sodium a day. If you limit your sodium to 1,500 mg a day, you can lower your blood pressure even more. Tips for success  · Start small. Do not try to make dramatic changes to your diet all at once. You might feel that you are missing out on your favorite foods and then be more likely to not follow the plan. Make small changes, and stick with them.  Once vertigo spells for a few days or weeks. Then the vertigo goes away. But it may come back again. The vertigo may be mild, or it may be bad enough to cause unsteadiness, nausea, and vomiting. When you move, your inner ear sends messages to the brain. This helps you keep your balance. Vertigo can happen when debris builds up in the inner ear. The buildup can cause the inner ear to send the wrong message to the brain. Your doctor may move you in different positions to help your vertigo get better faster. This is called the Epley maneuver. Your doctor may also prescribe medicines or exercises to help with your symptoms. Follow-up care is a key part of your treatment and safety. Be sure to make and go to all appointments, and call your doctor if you are having problems. It's also a good idea to know your test results and keep a list of the medicines you take. How can you care for yourself at home? · If your doctor suggests that you do Alonso-Daroff exercises:  ? Sit on the edge of a bed or sofa. Quickly lie down on the side that causes the worst vertigo. Lie on your side with your ear down. ? Stay in this position for at least 30 seconds or until the vertigo goes away. ? Sit up. If this causes vertigo, wait for it to stop. ? Repeat the procedure on the other side. ? Repeat this 10 times. Do these exercises 2 times a day until the vertigo is gone. When should you call for help? Call 911 anytime you think you may need emergency care. For example, call if:    · You have symptoms of a stroke. These may include:  ? Sudden numbness, tingling, weakness, or loss of movement in your face, arm, or leg, especially on only one side of your body. ? Sudden vision changes. ? Sudden trouble speaking. ? Sudden confusion or trouble understanding simple statements. ? Sudden problems with walking or balance.   ? A sudden, severe headache that is different from past headaches.    Call your doctor now or seek immediate medical fall.  · If your doctor recommends medicine, take it exactly as directed. · Do not drive while you are having vertigo. Certain exercises, called Alonso-Daroff exercises, can help decrease vertigo. To do Alonso-Daroff exercises:  · Sit on the edge of a bed or sofa and quickly lie down on the side that causes the worst vertigo. Lie on your side with your ear down. · Stay in this position for at least 30 seconds or until the vertigo goes away. · Sit up. If this causes vertigo, wait for it to stop. · Repeat the procedure on the other side. · Repeat this 10 times. Do these exercises 2 times a day until the vertigo is gone. When should you call for help? Call 911 anytime you think you may need emergency care. For example, call if:    · You passed out (lost consciousness).     · You have symptoms of a stroke. These may include:  ? Sudden numbness, tingling, weakness, or loss of movement in your face, arm, or leg, especially on only one side of your body. ? Sudden vision changes. ? Sudden trouble speaking. ? Sudden confusion or trouble understanding simple statements. ? Sudden problems with walking or balance. ? A sudden, severe headache that is different from past headaches.    Call your doctor now or seek immediate medical care if:    · Vertigo occurs with a fever, a headache, or ringing in your ears.     · You have new or increased nausea and vomiting.    Watch closely for changes in your health, and be sure to contact your doctor if:    · Vertigo gets worse or happens more often.     · Vertigo has not gotten better after 2 weeks. Where can you learn more? Go to https://Likeliismiley.Chat& (ChatAnd). org and sign in to your [employer] account. Enter V980 in the Qonf box to learn more about \"Vertigo: Care Instructions. \"     If you do not have an account, please click on the \"Sign Up Now\" link. Current as of: March 27, 2018  Content Version: 11.9  © 6680-0092 [employer], [facility].  Care instructions adapted under license by [facility] ([facility]). If you have questions about a medical condition or this instruction, always ask your healthcare professional. [address] any warranty or liability for your use of this information. Patient Education        Cawthorne Exercises for Vertigo: Care Instructions  Your Care Instructions  Simple exercises can help you regain your balance when you have vertigo. If you have Ménière's disease, benign paroxysmal positional vertigo (BPPV), or another inner ear problem, you may have vertigo off and on. Do these exercises first thing in the morning and before you go to bed. You might get dizzy when you first start them. If this happens, try to do them for at least 5 minutes. Do a group of exercises at a time, starting at the top of the list. It may take several weeks before you can do all the exercises without feeling dizzy. Follow-up care is a key part of your treatment and safety. Be sure to make and go to all appointments, and call your doctor if you are having problems. It's also a good idea to know your test results and keep a list of the medicines you take. How can you care for yourself at home? Exercise 1  While sitting on the side of the bed and holding your head still:  · Look up as far as you can. · Look down as far as you can. · Look from side to side as far as you can. · Stretch your arm straight out in front of you. Focus on your index finger. Continue to focus on your finger while you bring it to your nose. Exercise 2  While sitting on the side of the bed:  · Bring your head as far back as you can. · Bring your head forward to touch your chin to your chest.  · Turn your head from side to side. · Do these exercises first with your eyes open. Then try with your eyes closed. Exercise 3  While sitting on the side of the bed:  · Shrug your shoulders straight upward, then relax them.   · Bend over and try to touch the ground with your fingers. Then go back to a sitting position. · Toss a small ball from one hand to the other. Throw the ball higher than your eyes so you have to look up. Exercise 4  While standing (with someone close by if you feel uncomfortable):  · Repeat Exercise 1.  · Repeat Exercise 2.  · Pass a ball between your legs and above your head. · Sit down and then stand up. Repeat. Turn around in a [place] a different way each time you stand. · With someone close by to help you, try the above exercises with your eyes closed. Exercise 5  In a room that is cleared of obstacles:  · Walk to a corner of the room, turn to your right, and walk back to the starting point. Now, repeat and turn left. · Walk up and down a slope. Now try stairs. · While holding on to someone's arm, try these exercises with your eyes closed. When should you call for help? Watch closely for changes in your health, and be sure to contact your doctor if:    · You do not get better as expected. Where can you learn more? Go to https://Zidisha.Cellrox. org and sign in to your [employer] account. Enter C348 in the S[facility] box to learn more about \"Cawthorne Exercises for Vertigo: Care Instructions. \"     If you do not have an account, please click on the \"Sign Up Now\" link. Current as of: March 27, 2018  Content Version: 11.9  © 8800-2552 [employer]. Care instructions adapted under license by [facility] ([facility]). If you have questions about a medical condition or this instruction, always ask your healthcare professional. [address] any warranty or liability for your use of this information. Patient Education        Vertigo: Exercises  Your Care Instructions  Here are some examples of typical rehabilitation exercises for your condition. Start each exercise slowly. Ease off the exercise if you start to have pain.   Your doctor or physical therapist will tell you when you can start these exercises and which ones will work best for you. How to do the exercises  Exercise 1    1. Stand with a chair in front of you and a wall behind you. If you begin to fall, you may use them for support. 2. Stand with your feet together and your arms at your sides. 3. Move your head up and down 10 times. Exercise 2    1. Move your head side to side 10 times. Exercise 3    1. Move your head diagonally up and down 10 times. Exercise 4    1. Move your head diagonally up and down 10 times on the other side. Follow-up care is a key part of your treatment and safety. Be sure to make and go to all appointments, and call your doctor if you are having problems. It's also a good idea to know your test results and keep a list of the medicines you take. Where can you learn more? Go to https://Ascentispeurieleweb.[email or web address] org and sign in to your [employer] account. Enter F349 in the Hello Agent box to learn more about \"Vertigo: Exercises. \"     If you do not have an account, please click on the \"Sign Up Now\" link. Current as of: March 27, 2018  Content Version: 11.9  © 0569-3424 Yahoo!, [employer]. Care instructions adapted under license by [facility] ([facility]). If you have questions about a medical condition or this instruction, always ask your healthcare professional. [place] [address] any warranty or liability for your use of this information. Patient Education        Epley Maneuver at Home for Vertigo: Exercises  Your Care Instructions  Vertigo is a spinning or whirling sensation when you move your head. Your doctor may have moved you in different positions to help your vertigo get better faster. This is called the Epley maneuver. Your doctor also may have asked you to do these exercises at home. Do the exercises as often as your doctor recommends. If your vertigo is getting worse, your doctor may have you change the exercise or stop it. Step 1  Step 1    1.  Sit on the edge of a bed or sofa. Step 2    1. Turn your head 45 degrees in the direction your doctor told you to. This should be toward the ear that causes the most vertigo for you. In this picture, the woman is turning toward her left ear. Step 3    1. Tilt yourself backward until you are lying on your back. Your head should still be at a 45-degree turn. Your head should be about midway between looking straight ahead and looking out to your side. Hold for 30 seconds. If you have vertigo, stay in this position until it stops. Step 4    1. Turn your head 90 degrees toward the ear that has the least vertigo. In this picture, the woman is turning to the right because she has vertigo on her left side. The point of your chin should be raised and over your shoulder. Hold for 30 seconds. Step 5    1. Roll onto the side with the least vertigo. You should now be looking at the floor. Hold for 30 seconds. Follow-up care is a key part of your treatment and safety. Be sure to make and go to all appointments, and call your doctor if you are having problems. It's also a good idea to know your test results and keep a list of the medicines you take. Where can you learn more? Go to https://IroFitpepiceweb.MetaCure. org and sign in to your [employer] account. Enter M493 in the Dormir box to learn more about \"Epley Maneuver at Home for Vertigo: Exercises. \"     If you do not have an account, please click on the \"Sign Up Now\" link. Current as of: Linh 3, 2018  Content Version: 11.9  © 8012-9227 [employer]unalytics[employer]. Care instructions adapted under license by [facility] ([facility]). If you have questions about a medical condition or this instruction, always ask your healthcare professional. [place] [address] any warranty or liability for your use of this information.          Patient Education        Neck Arthritis: Exercises  Your Care Instructions  Here are some examples of typical rehabilitation exercises for your condition. Start each exercise slowly. Ease off the exercise if you start to have pain. Your doctor or physical therapist will tell you when you can start these exercises and which ones will work best for you. How to do the exercises  Neck stretches to the side    4. This stretch works best if you keep your shoulder down as you lean away from it. To help you remember to do this, start by relaxing your shoulders and lightly holding on to your thighs or your chair. 5. Tilt your head toward your shoulder and hold for 15 to 30 seconds. Let the weight of your head stretch your muscles. 6. Repeat 2 to 4 times toward each shoulder. Chin tuck    2. Lie on the floor with a rolled-up towel under your neck. Your head should be touching the floor. 3. Slowly bring your chin toward your chest.  4. Hold for a count of 6, and then relax for up to 10 seconds. 5. Repeat 8 to 12 times. Active cervical rotation    2. Sit in a firm chair, or stand up straight. 3. Keeping your chin level, turn your head to the right, and hold for 15 to 30 seconds. 4. Turn your head to the left and hold for 15 to 30 seconds. 5. Repeat 2 to 4 times to each side. Shoulder blade squeeze    2. While standing, squeeze your shoulder blades together. 3. Do not raise your shoulders up as you are squeezing. 4. Hold for 6 seconds. 5. Repeat 8 to 12 times. Shoulder rolls    1. Sit comfortably with your feet shoulder-width apart. You can also do this exercise standing up. 2. Roll your shoulders up, then back, and then down in a smooth, circular motion. 3. Repeat 2 to 4 times. Follow-up care is a key part of your treatment and safety. Be sure to make and go to all appointments, and call your doctor if you are having problems. It's also a good idea to know your test results and keep a list of the medicines you take. Where can you learn more? Go to https://jamaica.Entrec. org and sign in to your MyChart account. Enter V861 in the [facility] box to learn more about \"Neck Arthritis: Exercises. \"     If you do not have an account, please click on the \"Sign Up Now\" link. Current as of: September 20, 2018  Content Version: 11.9  © 6174-8140 [employer], [employer]. Care instructions adapted under license by [facility] ([facility]). If you have questions about a medical condition or this instruction, always ask your healthcare professional. [address] any warranty or liability for your use of this information.

## 2019-03-26 ENCOUNTER — TELEPHONE (OUTPATIENT)
Dept: INTERNAL MEDICINE CLINIC | Age: 84
End: 2019-03-26

## 2019-03-26 RX ORDER — MECLIZINE HCL 12.5 MG/1
12.5 TABLET ORAL 3 TIMES DAILY PRN
Qty: 30 TABLET | Refills: 0 | Status: SHIPPED | OUTPATIENT
Start: 2019-03-26 | End: 2019-04-05

## 2019-04-14 ASSESSMENT — ENCOUNTER SYMPTOMS
ORTHOPNEA: 0
CONSTIPATION: 0
EYE PAIN: 0
CHOKING: 0
BACK PAIN: 1
VISUAL CHANGE: 0
PHOTOPHOBIA: 0
COUGH: 0
TROUBLE SWALLOWING: 0
SHORTNESS OF BREATH: 1
ABDOMINAL PAIN: 0
NAUSEA: 0

## 2019-04-14 NOTE — PROGRESS NOTES
Dwight Sarkar is a 80 y.o. female with history of vertigo, COPD, GERD, hypothyroidism, who presents with     Chief Complaint   Patient presents with    Dizziness     on & off x 6 weeks twice daily, no known triggers when moving in bed    Hypertension     \"b/p has been high at home\", no changes in stress level, diet       Interim history: since the last office visit 3 months ago, the patient has been doing well, no emergencies, no falls or accidents. Remains fully independent in all activities of daily living and with no perceived decline in memory or physical abilities. Medication refills requests were received by the office and done electronically. The following laboratory reports since the past visit were reviewed (the ones pertinent to today's visit were discussed with the patient/ caregiver): No visits with results within 3 Month(s) from this visit.    Latest known visit with results is:   Orders Only on 08/08/2018   Component Date Value Ref Range Status    WBC 08/08/2018 6.7  4.8 - 10.8 K/uL Final    RBC 08/08/2018 4.76  4.20 - 5.40 M/uL Final    Hemoglobin 08/08/2018 14.6  12.0 - 16.0 g/dL Final    Hematocrit 08/08/2018 43.1  37.0 - 47.0 % Final    MCV 08/08/2018 90.6  82.0 - 100.0 fL Final    MCH 08/08/2018 30.8  27.0 - 31.3 pg Final    MCHC 08/08/2018 33.9  33.0 - 37.0 % Final    RDW 08/08/2018 13.9  11.5 - 14.5 % Final    Platelets 08/52/1524 201  130 - 400 K/uL Final    Sodium 08/08/2018 137  132 - 144 mEq/L Final    Potassium 08/08/2018 4.2  3.5 - 5.1 mEq/L Final    Chloride 08/08/2018 96* 98 - 107 mEq/L Final    CO2 08/08/2018 27  22 - 29 mEq/L Final    Anion Gap 08/08/2018 14* 7 - 13 mEq/L Final    Glucose 08/08/2018 86  74 - 109 mg/dL Final    BUN 08/08/2018 15  8 - 23 mg/dL Final    CREATININE 08/08/2018 0.64  0.50 - 0.90 mg/dL Final    GFR Non- 08/08/2018 >60.0  >60 Final    Comment: >60 mL/min/1.73m2 EGFR, calc. for ages 25 and older using the  MDRD formula (not corrected for weight), is valid for stable  renal function.  GFR  08/08/2018 >60.0  >60 Final    Comment: >60 mL/min/1.73m2 EGFR, calc. for ages 25 and older using the  MDRD formula (not corrected for weight), is valid for stable  renal function.  Calcium 08/08/2018 9.5  8.6 - 10.2 mg/dL Final    Total Protein 08/08/2018 7.5  6.4 - 8.1 g/dL Final    Alb 08/08/2018 4.5  3.9 - 4.9 g/dL Final    Total Bilirubin 08/08/2018 0.4  0.0 - 1.2 mg/dL Final    Alkaline Phosphatase 08/08/2018 81  40 - 130 U/L Final    ALT 08/08/2018 18  0 - 33 U/L Final    AST 08/08/2018 20  0 - 35 U/L Final    Globulin 08/08/2018 3.0  2.3 - 3.5 g/dL Final    TSH 08/08/2018 1.800  0.270 - 4.200 uIU/mL Final    Vit D, 25-Hydroxy 08/08/2018 38.5  30.0 - 100.0 ng/mL Final    Comment: ( ng/mL)  Optimum Level    This assay accurately quantifies the sum of vitamin D3, 25-Hydroxy and  vitamin D2, 25-Hyroxy.  Hemoglobin A1C 08/08/2018 5.8  4.8 - 5.9 % Final    Color, UA 08/08/2018 JULIO* Straw/Yellow Final    Clarity, UA 08/08/2018 CLOUDY* Clear Final    Glucose, Ur 08/08/2018 Negative  Negative mg/dL Final    Bilirubin Urine 08/08/2018 Negative  Negative Final    Ketones, Urine 08/08/2018 TRACE* Negative mg/dL Final    Specific Gravity, UA 08/08/2018 1.030  1.005 - 1.030 Final    Blood, Urine 08/08/2018 Negative  Negative Final    pH, UA 08/08/2018 5.0  5.0 - 9.0 Final    Protein, UA 08/08/2018 Negative  Negative mg/dL Final    Urobilinogen, Urine 08/08/2018 1.0  <2.0 E.U./dL Final    Nitrite, Urine 08/08/2018 Negative  Negative Final    Leukocyte Esterase, Urine 08/08/2018 MODERATE* Negative Final    WBC, UA 08/08/2018 6-10* 0 - 5 /HPF Final    RBC, UA 08/08/2018 3-5* 0 - 2 /HPF Final    Bacteria, UA 08/08/2018 Moderate  /HPF Final    Amorphous, UA 08/08/2018 3+   Final    Yeast, UA 08/08/2018 Present*  Final       HPI:    Hypertension   This is a chronic problem.  The current episode started more than 1 year ago. The problem has been waxing and waning since onset. The problem is uncontrolled. Associated symptoms include shortness of breath (exertional, occasional). Pertinent negatives include no anxiety, chest pain, headaches, malaise/fatigue, orthopnea, palpitations, peripheral edema or PND. Agents associated with hypertension include thyroid hormones. Risk factors for coronary artery disease include sedentary lifestyle, obesity and post-menopausal state. Past treatments include angiotensin blockers and diuretics. The current treatment provides moderate improvement. Compliance problems include diet and exercise. There is no history of angina, CVA or PVD. Identifiable causes of hypertension include sleep apnea. There is no history of renovascular disease. Dizziness   This is a recurrent problem. The current episode started 1 to 4 weeks ago. The problem occurs intermittently. The problem has been unchanged. Associated symptoms include vertigo. Pertinent negatives include no abdominal pain, chest pain, congestion, coughing, diaphoresis, fever, headaches, myalgias, nausea, numbness, rash, visual change or weakness. The symptoms are aggravated by bending and twisting. She has tried rest and position changes for the symptoms. The treatment provided mild relief. More detail above in the chiefcomplaint(s), interim history and below in the review of systems. Past Medical History:   Diagnosis Date    BPV (benign positional vertigo) 2019    Diverticulosis     GERD (gastroesophageal reflux disease)     with large hiatal hernia    Hiatal hernia with gastroesophageal reflux 2015    EGD Dr Ilda Dominguez History of blood transfusion     History of COPD 2012, VQ scan and CXR    has supplemental oxygen, rarely using it, no inhalers use    History of sleep apnea 2011    no CPAP use    Hypertension     Hypothyroidism     Nocturnal hypoxia 2011    nocturnal O2     Obesity (BMI 30-39. 9)  Osteoarthritis of multiple joints     Postnasal drip        Past Surgical History:   Procedure Laterality Date    HYSTERECTOMY      fibroid, bleeding    JOINT REPLACEMENT      [place] COLON CA SCRN NOT HI RSK IND N/A 9/1/2017    COLONOSCOPY performed by Yuni Romero MD at [address] [facility] ESOPHAGOGASTRODUODENOSCOPY TRANSORAL DIAGNOSTIC N/A 9/1/2017    EGD ESOPHAGOGASTRODUODENOSCOPY performed by Yuni Romero MD at [address], [address] Right 04/03/2012    TOTAL KNEE ARTHROPLASTY Bilateral     left x 2, right x1 (Dr Emre Goff)   [address] [facility] [address]  12/29/15    Dr. Lulu Duckworth Marital status:       Spouse name: Not on file    Number of children: 9    Years of education: Not on file    Highest education level: Not on file   Occupational History    Occupation: homemaker and , retired   Social Needs    Financial resource strain: Not on file    Food insecurity:     Worry: Not on file     Inability: Not on file   Liqueo needs:     Medical: Not on file     Non-medical: Not on file   Tobacco Use    Smoking status: Former Smoker    Smokeless tobacco: Never Used    Tobacco comment: quit 50 years ago   Substance and Sexual Activity    Alcohol use: No     Alcohol/week: 0.0 oz    Drug use: No    Sexual activity: Never   Lifestyle    Physical activity:     Days per week: Not on file     Minutes per session: Not on file    Stress: Not on file   Relationships    Social connections:     Talks on phone: Not on file     Gets together: Not on file     Attends Religion service: Not on file     Active member of club or organization: Not on file     Attends meetings of clubs or organizations: Not on file     Relationship status: Not on file    Intimate partner violence:     Fear of current or ex partner: Not on file     Emotionally abused: Not on file     Physically abused: Not on file     Forced sexual activity: Not on file   Other Topics Concern    Not on file   Social History Narrative    Born in [place] [place] (grandparents came from [place], settled in [place])    ,  (X 2)    Lives in a house with two of her sons    7 children, 3 M, 1 F    Lost 63 yo son in Feb 2018 from lung cancer    Was  for the [email or web address], [employer], cooking, housework       Family History   Problem Relation Age of Onset    Diabetes Maternal Aunt     Osteoarthritis Father     Osteoarthritis Sister     Osteoarthritis Brother     Hypertension Mother     Heart Failure Mother         dec age 71, rheumatic       Family and socialhistory were reviewed and pertinent changes documented. ALLERGIES    Ciprofloxacin hcl; Lipitor [atorvastatin]; and Zetia [ezetimibe]    Current Outpatient Medications on File Prior to Visit   Medication Sig Dispense Refill    fluticasone (FLONASE) 50 MCG/ACT nasal spray INHALE 2 sprays by Nasal route daily.  1 Bottle 3    valsartan (DIOVAN) 160 MG tablet Take 1 tablet by mouth daily 90 tablet 1    pantoprazole (PROTONIX) 40 MG tablet TAKE 1 TABLET BY MOUTH DAILY AS NEEDED (HEARTBURN) 90 tablet 1    levothyroxine (SYNTHROID) 50 MCG tablet TAKE 1 TABLET BY MOUTH EVERY DAY 90 tablet 1    sertraline (ZOLOFT) 50 MG tablet Take 1 tablet by mouth daily 90 tablet 1    rosuvastatin (CRESTOR) 10 MG tablet TAKE 1 TABLET BY MOUTH EVERY DAY 30 tablet 5    docusate sodium (COLACE) 100 MG capsule Take 100 mg by mouth every morning (before breakfast)      acetaminophen (TYLENOL) 500 MG tablet Take 500 mg by mouth every 6 hours as needed for Pain (1- 2 tabs in am) Indications: for pain      vitamin B-12 (CYANOCOBALAMIN) 1000 MCG tablet Take 1,000 mcg by mouth daily Indications: in am.      Cholecalciferol (VITAMIN D3) 2000 units CAPS Take 1,000 Units by mouth Daily with supper      Multiple Vitamins-Minerals (MULTIVITAMIN PO) Take by mouth daily      fexofenadine (ALLEGRA) 180 MG tablet Take 1 tablet by mouth daily as needed (postnasal drip). 30 tablet 3    nitroGLYCERIN (NITROSTAT) 0.4 MG SL tablet Place 1 tablet under the tongue every 5 minutes as needed for Chest pain. 25 tablet 6    OXYGEN Inhale 3 L into the lungs continuous.  aspirin 325 MG tablet Take 325 mg by mouth daily. No current facility-administered medications on file prior to visit. Review of Systems   Constitutional: Negative for activity change, appetite change, diaphoresis, fever, malaise/fatigue and unexpected weight change. HENT: Negative for congestion, hearing loss, nosebleeds and trouble swallowing. Eyes: Negative for photophobia and pain. Respiratory: Positive for shortness of breath (exertional, occasional). Negative for cough and choking. Cardiovascular: Negative for chest pain, palpitations, orthopnea, leg swelling and PND. Gastrointestinal: Negative for abdominal pain, constipation and nausea. Endocrine: Negative for cold intolerance, heat intolerance, polydipsia and polyuria. Genitourinary: Positive for frequency. Negative for dysuria, flank pain, hematuria and pelvic pain. Musculoskeletal: Positive for back pain. Negative for myalgias and neck stiffness. Skin: Negative for rash. Allergic/Immunologic: Negative for immunocompromised state. Neurological: Positive for dizziness and vertigo. Negative for tremors, speech difficulty, weakness, light-headedness, numbness and headaches. No memory loss, no unilateral weakness and no falls with injuries. Hematological: Does not bruise/bleed easily. Psychiatric/Behavioral: Negative for confusion, decreased concentration, dysphoric mood and sleep disturbance. The patient is not nervous/anxious.         Vitals:    03/18/19 1106 03/18/19 1110   BP: (!) 150/100 (!) 146/100   Site: Left Upper Arm Left Upper Arm   Position: Sitting Sitting   Cuff Size: Medium Adult Medium Adult   Pulse: 83    Temp: 98.2 °F (36.8 °C) visit: Benign paroxysmal positional vertigo, unspecified laterality                Resume vestibular exercises at home, on a regular daily basis, call with progress      Uncontrolled hypertension                Resume low dose diuretic and amlodipine, continue to monitor blood pressure daily at home, call office if >140/90. Continue to focus on diet, weight loss             Other orders  -     hydrochlorothiazide (MICROZIDE) 12.5 MG capsule; Take 1 capsule by mouth daily  -     amLODIPine (NORVASC) 2.5 MG tablet; Take 1 tablet by mouth daily (before lunch)        Plan:    Reviewed with the patient (/and caregiver if present): current health status, medications, activities and diet. See also orders and comments in the assessment section. Today's diagnosis (in the context ofchronic problems) was discussed with patient (/and caregiver if present), questions answered. Quality & Risk Score Accuracy    Last edited 04/01/19 12:13 EDT by William Ying MD        Side effects, adverse effects of the medication prescribed (or refilled) today, treatment plan/ rationale and result expectations have (again) been discussed with the patient who expresses understanding and consents to proceed as outlined above. Additional advise included in the \"after visit summary\". Orders Placed This Encounter   Medications    hydrochlorothiazide (MICROZIDE) 12.5 MG capsule     Sig: Take 1 capsule by mouth daily     Dispense:  30 capsule     Refill:  3    amLODIPine (NORVASC) 2.5 MG tablet     Sig: Take 1 tablet by mouth daily (before lunch)     Dispense:  90 tablet     Refill:  1     This prescription was filled on 11/19/2018. Any refills authorized will be placed on file. Further workup and plan will be determined based on clinical progression and follow up test/ treatment results. Close follow up needed to evaluate treatment results and for care coordination. Return in about 3 months (around 6/18/2019).     I have reviewed the patient's medical and surgical, family and social history, health maintenance schedule, and updated the computerized patient record. Please note this report has been partially produced by using speech recognition hardware. It may contain errors related to the system, including grammar, punctuation and spelling as well as words and phrases that may seem inaccurate. For anyquestions or concerns, please feel free to contact me for clarification.         Electronically signed by Jocelynn Mello MD

## 2019-05-02 ENCOUNTER — TELEPHONE (OUTPATIENT)
Dept: INTERNAL MEDICINE CLINIC | Age: 84
End: 2019-05-02

## 2019-05-02 DIAGNOSIS — J44.9 CHRONIC OBSTRUCTIVE PULMONARY DISEASE, UNSPECIFIED COPD TYPE (HCC): Primary | ICD-10-CM

## 2019-05-15 NOTE — TELEPHONE ENCOUNTER
Rx request   Requested Prescriptions     Pending Prescriptions Disp Refills    rosuvastatin (CRESTOR) 10 MG tablet [Pharmacy Med Name: ROSUVASTATIN CALCIUM 10 MG TABLET] 30 tablet 5     Sig: TAKE 1 TABLET BY MOUTH EVERY DAY    sertraline (ZOLOFT) 50 MG tablet [Pharmacy Med Name: SERTRALINE HCL 50 MG TABLET] 90 tablet 1     Sig: TAKE 1 TABLET BY MOUTH DAILY     LOV 3/18/2019  Next Visit Date:  Future Appointments   Date Time Provider Mary Parekh   6/18/2019 11:00 AM Kalin Don MD [facility] SERVICES

## 2019-05-16 RX ORDER — ROSUVASTATIN CALCIUM 10 MG/1
TABLET, COATED ORAL
Qty: 30 TABLET | Refills: 5 | Status: SHIPPED | OUTPATIENT
Start: 2019-05-16 | End: 2020-02-11 | Stop reason: SDUPTHER

## 2019-05-17 NOTE — TELEPHONE ENCOUNTER
Can I sign the papers for Dr. Randa Parish And can they be sent to [facility] so I can sign. Do I need to place a new order for the portable O2 concentrator or is this part of the same paperwork?

## 2019-05-22 RX ORDER — PANTOPRAZOLE SODIUM 40 MG/1
40 TABLET, DELAYED RELEASE ORAL DAILY PRN
Qty: 90 TABLET | Refills: 1 | Status: SHIPPED | OUTPATIENT
Start: 2019-05-22 | End: 2020-02-11 | Stop reason: SDUPTHER

## 2019-05-22 RX ORDER — LEVOTHYROXINE SODIUM 0.05 MG/1
TABLET ORAL
Qty: 90 TABLET | Refills: 1 | Status: SHIPPED | OUTPATIENT
Start: 2019-05-22 | End: 2019-11-19 | Stop reason: SDUPTHER

## 2019-06-18 ENCOUNTER — OFFICE VISIT (OUTPATIENT)
Dept: INTERNAL MEDICINE CLINIC | Age: 84
End: 2019-06-18
Payer: MEDICARE

## 2019-06-18 VITALS
SYSTOLIC BLOOD PRESSURE: 128 MMHG | OXYGEN SATURATION: 94 % | DIASTOLIC BLOOD PRESSURE: 80 MMHG | WEIGHT: 206.4 LBS | HEIGHT: 65 IN | BODY MASS INDEX: 34.39 KG/M2 | HEART RATE: 70 BPM | TEMPERATURE: 97.7 F

## 2019-06-18 DIAGNOSIS — J44.9 CHRONIC OBSTRUCTIVE PULMONARY DISEASE, UNSPECIFIED COPD TYPE (HCC): ICD-10-CM
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
## 2019-06-18 DIAGNOSIS — I10 ESSENTIAL HYPERTENSION: Primary | ICD-10-CM

## 2019-06-18 PROCEDURE — 4040F PNEUMOC VAC/ADMIN/RCVD: CPT | Performed by: INTERNAL MEDICINE

## 2019-06-18 PROCEDURE — G8417 CALC BMI ABV UP PARAM F/U: HCPCS | Performed by: INTERNAL MEDICINE

## 2019-06-18 PROCEDURE — G8926 SPIRO NO PERF OR DOC: HCPCS | Performed by: INTERNAL MEDICINE

## 2019-06-18 PROCEDURE — 1090F PRES/ABSN URINE INCON ASSESS: CPT | Performed by: INTERNAL MEDICINE

## 2019-06-18 PROCEDURE — 1036F TOBACCO NON-USER: CPT | Performed by: INTERNAL MEDICINE

## 2019-06-18 PROCEDURE — 1123F ACP DISCUSS/DSCN MKR DOCD: CPT | Performed by: INTERNAL MEDICINE

## 2019-06-18 PROCEDURE — 3023F SPIROM DOC REV: CPT | Performed by: INTERNAL MEDICINE

## 2019-06-18 PROCEDURE — G8427 DOCREV CUR MEDS BY ELIG CLIN: HCPCS | Performed by: INTERNAL MEDICINE

## 2019-06-18 PROCEDURE — G8400 PT W/DXA NO RESULTS DOC: HCPCS | Performed by: INTERNAL MEDICINE

## 2019-06-18 PROCEDURE — 99213 OFFICE O/P EST LOW 20 MIN: CPT | Performed by: INTERNAL MEDICINE

## 2019-06-18 RX ORDER — VALSARTAN 160 MG/1
160 TABLET ORAL DAILY
Qty: 90 TABLET | Refills: 1 | Status: SHIPPED | OUTPATIENT
Start: 2019-06-18 | End: 2019-08-19 | Stop reason: SDUPTHER

## 2019-06-18 RX ORDER — HYDROCHLOROTHIAZIDE 12.5 MG/1
CAPSULE, GELATIN COATED ORAL
Qty: 24 CAPSULE | Refills: 1
Start: 2019-06-18 | End: 2019-09-20

## 2019-06-18 ASSESSMENT — ENCOUNTER SYMPTOMS
EYE PAIN: 0
TROUBLE SWALLOWING: 0
ABDOMINAL DISTENTION: 0
COUGH: 0
ABDOMINAL PAIN: 0
PHOTOPHOBIA: 0
SHORTNESS OF BREATH: 1
ORTHOPNEA: 0
CHEST TIGHTNESS: 0

## 2019-06-18 NOTE — PATIENT INSTRUCTIONS
you can lose your balance and fall. · Talk to your doctor if you have numbness in your feet. Preventing falls at home  · Remove raised doorway thresholds, throw rugs, and clutter. Repair loose carpet or raised areas in the floor. · Move furniture and electrical cords to keep them out of walking paths. · Use nonskid floor wax, and wipe up spills right away, especially on ceramic tile floors. · If you use a walker or cane, put rubber tips on it. If you use crutches, clean the bottoms of them regularly with an abrasive pad, such as steel wool. · Keep your house well lit, especially [place], and outside walkways. Use night-lights in areas such as hallways and bathrooms. Add extra light switches or use remote switches (such as switches that go on or off when you clap your hands) to make it easier to turn lights on if you have to get up during the night. · Install sturdy handrails on stairways. · Move items in your cabinets so that the things you use a lot are on the lower shelves (about waist level). · Keep a cordless phone and a flashlight with new batteries by your bed. If possible, put a phone in each of the main rooms of your house, or carry a cell phone in case you fall and cannot reach a phone. Or, you can wear a device around your neck or wrist. You push a button that sends a signal for help. · Wear low-heeled shoes that fit well and give your feet good support. Use footwear with nonskid soles. Check the heels and soles of your shoes for wear. Repair or replace worn heels or soles. · Do not wear socks without shoes on wood floors. · Walk on the grass when the sidewalks are slippery. If you live in an area that gets snow and ice in the winter, sprinkle salt on slippery steps and sidewalks. Preventing falls in the bath  · Install grab bars and nonskid mats inside and outside your shower or tub and near the toilet and sinks. · Use shower chairs and bath benches.   · Use a hand-held shower head that will allow you to sit while showering. · Get into a tub or shower by putting the weaker leg in first. Get out of a tub or shower with your strong side first.  · Repair loose toilet seats and consider installing a raised toilet seat to make getting on and off the toilet easier. · Keep your bathroom door unlocked while you are in the shower. Where can you learn more? Go to https://chpepiceweb.[employer] org and sign in to your Syntropharmat account. Enter [id number] in the [employer] box to learn more about \"Preventing Falls: Care Instructions. \"     If you do not have an account, please click on the \"Sign Up Now\" link. Current as of: November 7, 2018  Content Version: 12.0  © 3655-1832 Healthwise, [employer]. Care instructions adapted under license by [facility] ([facility]). If you have questions about a medical condition or this instruction, always ask your healthcare professional. [place] [address] any warranty or liability for your use of this information. Patient Education        Preventing Outdoor Falls: Care Instructions  Your Care Instructions    Worries about falls don't need to keep you indoors. Outdoor activities like walking have big benefits for your health. You will need to watch your step and learn a few safety measures. If you are worried about having a fall outdoors, ask your doctor about exercises, classes, or physical therapy that may help. You can learn ways to gain strength, flexibility, and balance. Ask if it might help to use a cane or walker. You can make your time outdoors safer with a few simple measures. Follow-up care is a key part of your treatment and safety. Be sure to make and go to all appointments, and call your doctor if you are having problems. It's also a good idea to know your test results and keep a list of the medicines you take. How can you prevent falls outdoors? · Wear shoes with firm soles and low heels.  If you have to walk on an icy surface, use grippers that can be worn over your shoes in bad weather. · Be extra careful if weather is bad. Walk on the grass when the sidewalks are slick. If you live in a place that gets snow and ice in the winter, sprinkle salt on slippery stairs and sidewalks. · Be careful getting on or off buses and trains or getting in and out of cars. If handrails are available, use them. · Be careful when you cross the street. Look for crosswalks or places where curb cuts or ramps are present. · Try not to hurry, especially if you are carrying something. · Be cautious in parking lots or garages. There may be curbs or changes in pavement, or the height of the pavement may vary. · Make sure to wear the correct eyeglasses, if you need them. Reading glasses or bifocals can make it harder to see hazards that might be in your way. · If you are walking outdoors for exercise, try to:  ? Walk in well-lighted, well-maintained areas. These include high school or college tracks, shopping malls, and public spaces. ? Walk with a partner. ? Watch out for cracked sidewalks, curbs, changes in the height of the pavement, exposed tree roots, and debris such as fallen leaves or branches. Where can you learn more? Go to https://SparkupReaderpeurieleweb.[employer]. org and sign in to your Singspiel account. Enter U562 in the [facility] box to learn more about \"Preventing Outdoor Falls: Care Instructions. \"     If you do not have an account, please click on the \"Sign Up Now\" link. Current as of: November 7, 2018  Content Version: 12.0  © 4785-3836 [employer]. Care instructions adapted under license by [facility] ([facility]). If you have questions about a medical condition or this instruction, always ask your healthcare professional. [address] any warranty or liability for your use of this information.          Patient Education        How to Get Up Safely After a Fall: Care Instructions  Your Care Instructions    If you have injuries, health problems, or other reasons that may make it easy for you to fall at home, it is a good idea to learn how to get up safely after a fall. Learning how to get up correctly can help you avoid making an injury worse. Also, knowing what to do if you cannot get up can help you stay safe until help arrives. Follow-up care is a key part of your treatment and safety. Be sure to make and go to all appointments, and call your doctor if you are having problems. It's also a good idea to know your test results and keep a list of the medicines you take. How can you care for yourself after a fall? If you think you can get up  First lie still for a few minutes and think about how you feel. If your body feels okay and you think you can get up safely, follow the rest of the steps below:  1. Look for a chair or other piece of furniture that is close to you. 2. Roll onto your side and rest. Roll by turning your head in the direction you want to roll, move your shoulder and arm, then hip and leg in the same direction. 3. Lie still for a moment to let your blood pressure adjust.  4. Slowly push your upper body up, lift your head, and take a moment to rest.  5. Slowly get up on your hands and knees, and crawl to the chair or other stable piece of furniture. 6. Put your hands on the chair. 7. Move one foot forward, and place it flat on the floor. Your other leg should be bent with the knee on the floor. 8. Rise slowly, turn your body, and sit in the chair. Stay seated for a bit and think about how you feel. Call for help. Even if you feel okay, let someone know what happened to you. You might not know that you have a serious injury. If you cannot get up  1. If you think you are injured after a fall or you cannot get up, try not to panic. 2. Call out for help. 3. If you have a phone within reach or you have an emergency call device, use it to call for help.   4. If you do not have a phone within reach, try to slide yourself toward it. If you cannot get to the phone, try to slide toward a door or window or a place where you think you can be heard. 5. [place] or use an object to make noise so someone might hear you. 6. If you can reach something that you can use for a pillow, place it under your head. Try to stay warm by covering yourself with a blanket or clothing while you wait for help. When should you call for help? Call 911 anytime you think you may need emergency care. For example, call if:    · You passed out (lost consciousness).     · You cannot get up after a fall.     · You have severe pain.    Call your doctor now or seek immediate medical care if:    · You have new or worse pain.     · You are dizzy or lightheaded.     · You hit your head.    Watch closely for changes in your health, and be sure to contact your doctor if:    · You do not get better as expected. Where can you learn more? Go to https://Jetbay.[employer]. org and sign in to your MindMixer account. Enter J027 in the [facility] box to learn more about \"How to Get Up Safely After a Fall: Care Instructions. \"     If you do not have an account, please click on the \"Sign Up Now\" link. Current as of: November 7, 2018  Content Version: 12.0  © 3221-7864 Healthwise, [employer]. Care instructions adapted under license by [facility] ([facility]). If you have questions about a medical condition or this instruction, always ask your healthcare professional. [place] [address] any warranty or liability for your use of this information. Patient Education        pneumococcal 13-valent conjugate vaccine  Pronunciation:  TERESITA salmon 13-VAY bakari grey VAX een  Brand:  Prevnar 15  What is the most important information I should know about this vaccine? For children, the pneumococcal 13-valent vaccine is given in a series of shots.  The first shot is usually given when the child is 2 months old. The booster shots are then given at 4 months, 6 months, and 15to 13months of age. Adults usually receive only one dose of the vaccine. In a child older than 6 months who has not yet received this vaccine, the first dose can be given any time from the age of 10 months through 11 years (before the 7th birthday). If the child is less than 3year old at the time of the first shot, he or she will need 2 booster doses. If the child is 15 to 22 months old at the time of the first shot, he or she will need 1 booster dose. A child who is 2 years or older at the time of the first shot may need only the one shot and no booster doses. The timing of this vaccination is very important for it to be effective. Your child's individual booster schedule may be different from these guidelines. Follow your doctor's instructions or the schedule recommended by the health department of the state you live in. Keep track of any and all side effects your child has after receiving this vaccine. When the child receives a booster dose, you will need to tell the doctor if the previous shot caused any side effects. You can still receive a vaccine if you have a minor cold. In the case of a more severe illness with a fever or any type of infection, wait until you get better before receiving this vaccine. Becoming infected with pneumococcal disease (such as pneumonia or meningitis) is much more dangerous to your health than receiving this vaccine. However, like any medicine, this vaccine can cause side effects but the risk of serious side effects is extremely low. Be sure to keep your child on a regular schedule for other immunizations against diseases such as diphtheria, tetanus, pertussis (whooping cough), measles, mumps, hepatitis, or varicella (chicken pox). Your doctor or state health department can provide you with a recommended immunization schedule. What is pneumococcal 13-valent conjugate vaccine?   Pneumococcal disease is a serious infection caused by a bacteria. Pneumococcal bacteria can infect the sinuses and inner ear. It can also infect the lungs, blood, and brain, and these conditions can be fatal.  Pneumococcal 13-valent vaccine is used to prevent infection caused by pneumococcal bacteria. This vaccine contains 13 different types of pneumococcal bacteria. Pneumococcal 13-valent vaccine works by exposing you to a small amount of the bacteria or a protein from the bacteria, which causes the body to develop immunity to the disease. This vaccine will not treat an active infection that has already developed in the body. Pneumococcal 13-valent vaccine is for use in children from 6 weeks to 11years old, and in adults who are 48 and older. Becoming infected with pneumococcal disease (such as pneumonia or meningitis) is much more dangerous to your health than receiving this vaccine. However, like any medicine, this vaccine can cause side effects but the risk of serious side effects is extremely low. Like any vaccine, pneumococcal 13-valent vaccine may not provide protection from disease in every person. What should I discuss with my healthcare provider before receiving this vaccine? Keep track of any and all side effects your child has after receiving this vaccine. When the child receives a booster dose, you will need to tell the doctor if the previous shot caused any side effects. You should not receive this vaccine if you ever had a severe allergic reaction to a pneumococcal or diphtheria vaccine. Before your child receives this vaccine, tell your doctor if the child was born prematurely.   To make sure you or your child can safely receive this vaccine, tell your doctor if you or your child have any of these other conditions:  · a bleeding or blood clotting disorder such as hemophilia or easy bruising; or  · a weak immune system caused by disease, bone marrow transplant, or by using certain medicines or receiving cancer treatments. You can still receive a vaccine if you have a minor cold. In the case of a more severe illness with a fever or any type of infection, wait until you get better before receiving this vaccine. How is this vaccine given? This vaccine is injected into a muscle. You will receive this injection in a doctor's office or clinic setting. For children, the pneumococcal 13-valent vaccine is given in a series of shots. The first shot is usually given when the child is 3 months old. The booster shots are then given at 4 months, 6 months, and 15to 13months of age. Adults usually receive only one dose of the vaccine. The first injection should be given no earlier than 10weeks of age. Allow at least 2 months to pass between injections. If your child is older than 6 months, he or she can still receive this vaccine on the following schedule:  · Age 7-11 months: two injections at least 4 weeks apart, followed by a third injection after the child turns 1 year (at least 2 months after the second injection); · Age 12-23 months: two injections at least 2 months apart;  · Age 19 months to 5 years (before the 7th birthday): one injection. The timing of this vaccination is very important for it to be effective. Your child's individual booster schedule may be different from these guidelines. Follow your doctor's instructions or the schedule recommended by the health department of the state you live in. Your doctor may recommend treating fever and pain with an aspirin-free pain reliever such as acetaminophen (Tylenol) or ibuprofen (Motrin, Advil, and others) when the shot is given and for the next 24 hours. Follow the label directions or your doctor's instructions about how much of this medicine to give your child. It is especially important to prevent fever from occurring in a child who has a seizure disorder such as epilepsy.   Be sure to keep your child on a regular schedule for other immunizations such as diphtheria, tetanus, pertussis (whooping cough), hepatitis, and varicella (chicken pox). Your doctor or state health department can provide you with a recommended immunization schedule. What happens if I miss a dose? Contact your doctor if your child will miss a booster dose or gets behind schedule. The next dose should be given as soon as possible. There is no need to start over. Be sure your child receives all recommended doses of this vaccine. If your child does not receive the full series of vaccines, he or she may not be fully protected against the disease. What happens if I overdose? An overdose of this vaccine is unlikely to occur. What should I avoid before or after receiving this vaccine? Follow your doctor's instructions about any restrictions on food, beverages, or activity. What are the possible side effects of this vaccine? Your child should not receive a booster vaccine if he or she had a life-threatening allergic reaction after the first shot. Keep track of any and all side effects your child has after receiving this vaccine. When the child receives a booster dose, you will need to tell the doctor if the previous shot caused any side effects. Get emergency medical help if your child has any of these signs of an allergic reaction: hives; difficulty breathing; swelling of the face, lips, tongue, or throat. Call your doctor at once if you or your child has a serious side effect such as:  · high fever (103 degrees or higher);  · seizure (convulsions);  · wheezing, trouble breathing;  · severe stomach pain, severe vomiting or diarrhea;  · easy bruising or bleeding; or  · severe pain, itching, irritation, or skin changes where the shot was given.   Less serious side effects include  · crying, fussiness;  · headache, tired feeling;  · muscle or joint pain;  · drowsiness, sleeping more or less than usual;  · mild redness, swelling, tenderness, or a hard lump where the shot was given;  · loss of appetite, mild vomiting or diarrhea;  · low fever (102 degrees or less), chills; or  · mild skin rash. This is not a complete list of side effects and others may occur. Call your doctor for medical advice about side effects. You may report vaccine side effects to the [place] [address] and [facility] at [phone]. What other drugs will affect this vaccine? Before receiving this vaccine, tell the doctor about all other vaccines you or your child have recently received. Also tell the doctor if you or your child have recently received drugs or treatments that can weaken the immune system, including:  · an oral, nasal, inhaled, or injectable steroid medicine;  · chemotherapy or radiation;  · medications to treat psoriasis, rheumatoid arthritis, or other autoimmune disorders, such as azathioprine (Imuran), etanercept (Enbrel), leflunomide ([address]), and others; or  · medicines to treat or prevent organ transplant rejection, such as basiliximab (Simulect), cyclosporine (Sandimmune, Neoral, Gengraf), muromonab CD3 (Orthoclone), mycophenolate mofetil (CellCept), sirolimus (Rapamune), or tacrolimus (Prograf). If you are using any of these medications, you may not be able to receive the vaccine, or may need to wait until the other treatments are finished. There may be other drugs that can interact with pneumococcal 13-valent vaccine. Tell your doctor about all medications you use. This includes prescription, over-the-counter, vitamin, and herbal products. Do not start a new medication without telling your doctor. Where can I get more information? Your doctor or pharmacist can provide more information about this vaccine. Additional information is available from your local health department or the Centers for Disease Control and Prevention. Remember, keep this and all other medicines out of the reach of children, never share your medicines with others, and use this medication only for the indication prescribed.   Every effort has been made to ensure that the information provided by [address] is accurate, up-to-date, and complete, but no guarantee is made to that effect. Drug information contained herein may be time sensitive. [facility] information has been compiled for use by healthcare practitioners and consumers in the [place] and therefore [facility] does not warrant that uses outside of the [place] are appropriate, unless specifically indicated otherwise. [facility]'s drug information does not endorse drugs, diagnose patients or recommend therapy. [facility]'s drug information is an informational resource designed to assist licensed healthcare practitioners in caring for their patients and/or to serve consumers viewing this service as a supplement to, and not a substitute for, the expertise, skill, knowledge and judgment of healthcare practitioners. The absence of a warning for a given drug or drug combination in no way should be construed to indicate that the drug or drug combination is safe, effective or appropriate for any given patient. [facility] does not assume any responsibility for any aspect of healthcare administered with the aid of information [facility] provides. The information contained herein is not intended to cover all possible uses, directions, precautions, warnings, drug interactions, allergic reactions, or adverse effects. If you have questions about the drugs you are taking, check with your doctor, nurse or pharmacist.  Copyright 5581-6781 [address]. Version: 4.01. Revision date: 1/16/2012. Care instructions adapted under license by [facility] ([facility]). If you have questions about a medical condition or this instruction, always ask your healthcare professional. [place] [address] any warranty or liability for your use of this information.

## 2019-06-18 NOTE — PROGRESS NOTES
Ariela Morris is a 80 y.o. female non-smoker, history of hypothyroidism, nocturnal hypoxia, sleep apnea, GERD, obesity, who presents with     Chief Complaint   Patient presents with    Hypertension     at home blood pressure /90, then it goes down, no side effects from the medication    Shortness of Breath     uses 3 LNC oxygen for 4-6 hours during night, is requesting a tank she can carry with her    Fall     one week ago, while trying to get out of bed, feet slipped on the floor and she turned over, broke fall with the right elbow that was swollen and painful, now getting better       Interim history: She is a last office visit with me 3 months ago, the patient has been well, no emergencies. Medication refills requests were received by the office and done electronically. Patient continues to live at home, her son is also in the same household and ready to help. For this reason, she does not want to wear a life alert system. The following laboratory reports since the past visit were reviewed (the ones pertinent to today's visit were discussed with the patient/ caregiver): No visits with results within 3 Month(s) from this visit.    Latest known visit with results is:   Orders Only on 08/08/2018   Component Date Value Ref Range Status    WBC 08/08/2018 6.7  4.8 - 10.8 K/uL Final    RBC 08/08/2018 4.76  4.20 - 5.40 M/uL Final    Hemoglobin 08/08/2018 14.6  12.0 - 16.0 g/dL Final    Hematocrit 08/08/2018 43.1  37.0 - 47.0 % Final    MCV 08/08/2018 90.6  82.0 - 100.0 fL Final    MCH 08/08/2018 30.8  27.0 - 31.3 pg Final    MCHC 08/08/2018 33.9  33.0 - 37.0 % Final    RDW 08/08/2018 13.9  11.5 - 14.5 % Final    Platelets 60/01/6009 201  130 - 400 K/uL Final    Sodium 08/08/2018 137  132 - 144 mEq/L Final    Potassium 08/08/2018 4.2  3.5 - 5.1 mEq/L Final    Chloride 08/08/2018 96* 98 - 107 mEq/L Final    CO2 08/08/2018 27  22 - 29 mEq/L Final    Anion Gap 08/08/2018 14* 7 - 13 mEq/L Final    Glucose 08/08/2018 86  74 - 109 mg/dL Final    BUN 08/08/2018 15  8 - 23 mg/dL Final    CREATININE 08/08/2018 0.64  0.50 - 0.90 mg/dL Final    GFR Non- 08/08/2018 >60.0  >60 Final    Comment: >60 mL/min/1.73m2 EGFR, calc. for ages 25 and older using the  MDRD formula (not corrected for weight), is valid for stable  renal function.  GFR  08/08/2018 >60.0  >60 Final    Comment: >60 mL/min/1.73m2 EGFR, calc. for ages 25 and older using the  MDRD formula (not corrected for weight), is valid for stable  renal function.  Calcium 08/08/2018 9.5  8.6 - 10.2 mg/dL Final    Total Protein 08/08/2018 7.5  6.4 - 8.1 g/dL Final    Alb 08/08/2018 4.5  3.9 - 4.9 g/dL Final    Total Bilirubin 08/08/2018 0.4  0.0 - 1.2 mg/dL Final    Alkaline Phosphatase 08/08/2018 81  40 - 130 U/L Final    ALT 08/08/2018 18  0 - 33 U/L Final    AST 08/08/2018 20  0 - 35 U/L Final    Globulin 08/08/2018 3.0  2.3 - 3.5 g/dL Final    TSH 08/08/2018 1.800  0.270 - 4.200 uIU/mL Final    Vit D, 25-Hydroxy 08/08/2018 38.5  30.0 - 100.0 ng/mL Final    Comment: ( ng/mL)  Optimum Level    This assay accurately quantifies the sum of vitamin D3, 25-Hydroxy and  vitamin D2, 25-Hyroxy.       Hemoglobin A1C 08/08/2018 5.8  4.8 - 5.9 % Final    Color, UA 08/08/2018 JULIO* Straw/Yellow Final    Clarity, UA 08/08/2018 CLOUDY* Clear Final    Glucose, Ur 08/08/2018 Negative  Negative mg/dL Final    Bilirubin Urine 08/08/2018 Negative  Negative Final    Ketones, Urine 08/08/2018 TRACE* Negative mg/dL Final    Specific Gravity, UA 08/08/2018 1.030  1.005 - 1.030 Final    Blood, Urine 08/08/2018 Negative  Negative Final    pH, UA 08/08/2018 5.0  5.0 - 9.0 Final    Protein, UA 08/08/2018 Negative  Negative mg/dL Final    Urobilinogen, Urine 08/08/2018 1.0  <2.0 E.U./dL Final    Nitrite, Urine 08/08/2018 Negative  Negative Final    Leukocyte Esterase, Urine 08/08/2018 MODERATE* Negative Final loss of consciousness, numbness or tingling. She has tried elevation for the symptoms. The treatment provided significant relief. More detail above in the chiefcomplaint(s), interim history and below in the review of systems. Past Medical History:   Diagnosis Date    BPV (benign positional vertigo) 2019    Diverticulosis     GERD (gastroesophageal reflux disease)     with large hiatal hernia    Hiatal hernia with gastroesophageal reflux 2015    EGD Dr Amy Montesinos History of blood transfusion     History of COPD 2012, VQ scan and CXR    has supplemental oxygen, uses it for 4-6 hours nightly, no inhalers use    History of sleep apnea 2011    no CPAP use    Hypertension     Hypothyroidism     Nocturnal hypoxia 2011    nocturnal O2, 3 LNC, 4-6 hours nightly    Obesity (BMI 30-39. 9)     Osteoarthritis of multiple joints     Postnasal drip        Past Surgical History:   Procedure Laterality Date    HYSTERECTOMY      fibroid, bleeding    JOINT REPLACEMENT      NM COLON CA SCRN NOT HI RSK IND N/A 9/1/2017    COLONOSCOPY performed by Jessie Bauman MD at [address] [facility] ESOPHAGOGASTRODUODENOSCOPY TRANSORAL DIAGNOSTIC N/A 9/1/2017    EGD ESOPHAGOGASTRODUODENOSCOPY performed by Jessie Bauman MD at [address] Right 04/03/2012    TOTAL KNEE ARTHROPLASTY Bilateral     left x 2, right x1 (Dr Tam Green)   [address]  12/29/15    Dr. She Valdivia Marital status:       Spouse name: Not on file    Number of children: 9    Years of education: Not on file    Highest education level: Not on file   Occupational History    Occupation: homemaker and , retired   Social Needs    Financial resource strain: Not on file    Food insecurity:     Worry: Not on file     Inability: Not on file   [place] needs:     Medical: Not on file     Non-medical: Not on file   Tobacco Use    Smoking status: Former Smoker    Smokeless tobacco: Never Used    Tobacco comment: quit 50 years ago   Substance and Sexual Activity    Alcohol use: No     Alcohol/week: 0.0 oz    Drug use: No    Sexual activity: Never   Lifestyle    Physical activity:     Days per week: Not on file     Minutes per session: Not on file    Stress: Not on file   Relationships    Social connections:     Talks on phone: Not on file     Gets together: Not on file     Attends Jehovah's witness service: Not on file     Active member of club or organization: Not on file     Attends meetings of clubs or organizations: Not on file     Relationship status: Not on file    Intimate partner violence:     Fear of current or ex partner: Not on file     Emotionally abused: Not on file     Physically abused: Not on file     Forced sexual activity: Not on file   Other Topics Concern    Not on file   Social History Narrative    Born in [place] (grandparents came from [place], settled in [place])    ,  (X 2)    Lives in a house with two of her sons    7 children, 3 M, 1 F    Lost 61 yo son in Feb 2018 from lung cancer    Was  for the [employer] housework       Family History   Problem Relation Age of Onset    Diabetes Maternal Aunt     Osteoarthritis Father     Osteoarthritis Sister     Osteoarthritis Brother     Hypertension Mother     Heart Failure Mother         dec age 71, rheumatic       Family and socialhistory were reviewed and pertinent changes documented. ALLERGIES    Ciprofloxacin hcl; Lipitor [atorvastatin]; and Zetia [ezetimibe]    Current Outpatient Medications on File Prior to Visit   Medication Sig Dispense Refill    Misc.  Devices MISC Portable Oxygen concentrator 1 Device 0    pantoprazole (PROTONIX) 40 MG tablet TAKE 1 TABLET BY MOUTH DAILY AS NEEDED (HEARTBURN) 90 tablet 1    levothyroxine (SYNTHROID) 50 MCG tablet TAKE 1 TABLET BY MOUTH EVERY DAY 90 tablet 1    rosuvastatin (CRESTOR) 10 MG tablet TAKE 1 TABLET BY MOUTH EVERY DAY 30 tablet 5    sertraline (ZOLOFT) 50 MG tablet TAKE 1 TABLET BY MOUTH DAILY 90 tablet 1    amLODIPine (NORVASC) 2.5 MG tablet Take 1 tablet by mouth daily (before lunch) 90 tablet 1    fluticasone (FLONASE) 50 MCG/ACT nasal spray INHALE 2 sprays by Nasal route daily. 1 Bottle 3    acetaminophen (TYLENOL) 500 MG tablet Take 500 mg by mouth every 6 hours as needed for Pain (1- 2 tabs in am) Indications: for pain      vitamin B-12 (CYANOCOBALAMIN) 1000 MCG tablet Take 1,000 mcg by mouth daily Indications: in am.      Cholecalciferol (VITAMIN D3) 2000 units CAPS Take 1,000 Units by mouth Daily with supper      Multiple Vitamins-Minerals (MULTIVITAMIN PO) Take by mouth daily      fexofenadine (ALLEGRA) 180 MG tablet Take 1 tablet by mouth daily as needed (postnasal drip). 30 tablet 3    OXYGEN Inhale 3 L into the lungs continuous.  aspirin 325 MG tablet Take 325 mg by mouth daily.  nitroGLYCERIN (NITROSTAT) 0.4 MG SL tablet Place 1 tablet under the tongue every 5 minutes as needed for Chest pain. 25 tablet 6     No current facility-administered medications on file prior to visit. Review of Systems   Constitutional: Negative for fatigue, malaise/fatigue and unexpected weight change. HENT: Negative for nosebleeds and trouble swallowing. Eyes: Negative for photophobia and pain. Respiratory: Positive for shortness of breath. Negative for cough and chest tightness. Cardiovascular: Negative for chest pain, palpitations, orthopnea, leg swelling, syncope and PND. Gastrointestinal: Negative for abdominal distention and abdominal pain. Endocrine: Negative for cold intolerance and heat intolerance. Genitourinary: Negative for dysuria and hematuria. Musculoskeletal: Negative for joint swelling. Skin: Negative for wound. Allergic/Immunologic: Negative for immunocompromised state.    Neurological: Negative for swelling, no crepitus and no deformity. Neurological: She is alert and oriented to person, place, and time. Coordination normal.   Walks slowly, wheeled walker assisted due to knee and right hip pain, no focal neurological deficits   Skin: Skin is warm, dry and intact. Ecchymosis noted. Exam of the right elbow reveals minimal swelling of the periarticular soft tissues, normal range of motion. There is a small dime size subcutaneous hematoma to the left side of the antecubital fossa space, surrounded by ecchymosis which is fading. Psychiatric: She has a normal mood and affect. Her speech is normal and behavior is normal. Judgment normal. Her mood appears not anxious. She is not slowed and not withdrawn. Cognition and memory are normal. She does not express inappropriate judgment. She does not exhibit a depressed mood. She exhibits normal recent memory and normal remote memory. no signs or symptoms of cognitive decline She is attentive. Assessment:    Nell Garcia was seen today for hypertension, shortness of breath and fall. Diagnoses and all orders for this visit:    Essential hypertension                Better controlled after the addition of low-dose amlodipine. Reduce hydrochlorothiazide to 12.5 mg twice weekly to reduce risk for hypovolemia and falls      Chronic obstructive pulmonary disease, unspecified COPD type ([address]               Minimally symptomatic but still requiring nocturnal oxygen      Fall on same level from slipping, tripping and stumbling with subsequent striking against furniture, initial encounter                Discussed fall precautions and handout given to patient to take home and make adjustments to her living environment if need be      Other orders  -     valsartan (DIOVAN) 160 MG tablet; Take 1 tablet by mouth daily  -     hydrochlorothiazide (MICROZIDE) 12.5 MG capsule;  Take 1 tab po twice a week        Plan:    Reviewed with the patient (/and caregiver if present): current health status, medications, activities and diet. See also orders and comments in the assessment section. Today's diagnosis (in the context ofchronic problems) was discussed with patient (/and caregiver if present), questions answered. The current medical regimen is effective;  continue present plan and medications. Orders Placed This Encounter   Medications    valsartan (DIOVAN) 160 MG tablet     Sig: Take 1 tablet by mouth daily     Dispense:  90 tablet     Refill:  1    hydrochlorothiazide (MICROZIDE) 12.5 MG capsule     Sig: Take 1 tab po twice a week     Dispense:  24 capsule     Refill:  1       Close follow up needed to evaluate treatment results and for care coordination. Return in about 3 months (around 9/18/2019). I have reviewed the patient's medical and surgical, family and social history, health maintenance schedule, and updated the computerized patient record. Please note this report has been partially produced by using speech recognition hardware. It may contain errors related to the system, including grammar, punctuation and spelling as well as words and phrases that may seem inaccurate. For anyquestions or concerns, please feel free to contact me for clarification.         Electronically signed by Lizette Shields MD

## 2019-09-20 ENCOUNTER — OFFICE VISIT (OUTPATIENT)
Dept: INTERNAL MEDICINE CLINIC | Age: 84
End: 2019-09-20
Payer: MEDICARE

## 2019-09-20 VITALS
WEIGHT: 204.4 LBS | TEMPERATURE: 98 F | DIASTOLIC BLOOD PRESSURE: 82 MMHG | OXYGEN SATURATION: 95 % | HEART RATE: 68 BPM | SYSTOLIC BLOOD PRESSURE: 154 MMHG | BODY MASS INDEX: 34.01 KG/M2

## 2019-09-20 DIAGNOSIS — R53.83 FATIGUE, UNSPECIFIED TYPE: ICD-10-CM

## 2019-09-20 DIAGNOSIS — Z23 FLU VACCINE NEED: ICD-10-CM

## 2019-09-20 DIAGNOSIS — I10 UNCONTROLLED HYPERTENSION: Primary | ICD-10-CM

## 2019-09-20 LAB
ALBUMIN SERPL-MCNC: 4.4 G/DL (ref 3.5–4.6)
ALP BLD-CCNC: 75 U/L (ref 40–130)
ALT SERPL-CCNC: 12 U/L (ref 0–33)
ANION GAP SERPL CALCULATED.3IONS-SCNC: 11 MEQ/L (ref 9–15)
AST SERPL-CCNC: 15 U/L (ref 0–35)
BACTERIA: ABNORMAL /HPF
BASOPHILS ABSOLUTE: 0.1 K/UL (ref 0–0.2)
BASOPHILS RELATIVE PERCENT: 1 %
BILIRUB SERPL-MCNC: 0.5 MG/DL (ref 0.2–0.7)
BILIRUBIN URINE: NEGATIVE
BLOOD, URINE: NEGATIVE
BUN BLDV-MCNC: 17 MG/DL (ref 8–23)
CALCIUM SERPL-MCNC: 9.7 MG/DL (ref 8.5–9.9)
CHLORIDE BLD-SCNC: 99 MEQ/L (ref 95–107)
CHOLESTEROL, TOTAL: 141 MG/DL (ref 0–199)
CLARITY: CLEAR
CO2: 30 MEQ/L (ref 20–31)
COLOR: YELLOW
CREAT SERPL-MCNC: 0.54 MG/DL (ref 0.5–0.9)
EOSINOPHILS ABSOLUTE: 0.1 K/UL (ref 0–0.7)
EOSINOPHILS RELATIVE PERCENT: 2.3 %
EPITHELIAL CELLS, UA: ABNORMAL /HPF (ref 0–5)
GFR AFRICAN AMERICAN: >60
GFR NON-AFRICAN AMERICAN: >60
GLOBULIN: 3.2 G/DL (ref 2.3–3.5)
GLUCOSE BLD-MCNC: 89 MG/DL (ref 70–99)
GLUCOSE URINE: NEGATIVE MG/DL
HCT VFR BLD CALC: 42.7 % (ref 37–47)
HDLC SERPL-MCNC: 47 MG/DL (ref 40–59)
HEMOGLOBIN: 14.2 G/DL (ref 12–16)
HYALINE CASTS: ABNORMAL /HPF (ref 0–5)
KETONES, URINE: ABNORMAL MG/DL
LDL CHOLESTEROL CALCULATED: 73 MG/DL (ref 0–129)
LEUKOCYTE ESTERASE, URINE: ABNORMAL
LYMPHOCYTES ABSOLUTE: 1.2 K/UL (ref 1–4.8)
LYMPHOCYTES RELATIVE PERCENT: 19.4 %
MCH RBC QN AUTO: 30.4 PG (ref 27–31.3)
MCHC RBC AUTO-ENTMCNC: 33.2 % (ref 33–37)
MCV RBC AUTO: 91.8 FL (ref 82–100)
MONOCYTES ABSOLUTE: 0.6 K/UL (ref 0.2–0.8)
MONOCYTES RELATIVE PERCENT: 9.5 %
NEUTROPHILS ABSOLUTE: 4.2 K/UL (ref 1.4–6.5)
NEUTROPHILS RELATIVE PERCENT: 67.8 %
NITRITE, URINE: NEGATIVE
PDW BLD-RTO: 13.7 % (ref 11.5–14.5)
PH UA: 5.5 (ref 5–9)
PLATELET # BLD: 214 K/UL (ref 130–400)
POTASSIUM SERPL-SCNC: 4.3 MEQ/L (ref 3.4–4.9)
PROTEIN UA: ABNORMAL MG/DL
RBC # BLD: 4.66 M/UL (ref 4.2–5.4)
RBC UA: ABNORMAL /HPF (ref 0–2)
SODIUM BLD-SCNC: 140 MEQ/L (ref 135–144)
SPECIFIC GRAVITY UA: 1.02 (ref 1–1.03)
TOTAL PROTEIN: 7.6 G/DL (ref 6.3–8)
TRIGL SERPL-MCNC: 103 MG/DL (ref 0–150)
TSH SERPL DL<=0.05 MIU/L-ACNC: 2.23 UIU/ML (ref 0.44–3.86)
URINE REFLEX TO CULTURE: YES
UROBILINOGEN, URINE: 0.2 E.U./DL
WBC # BLD: 6.1 K/UL (ref 4.8–10.8)
WBC UA: ABNORMAL /HPF (ref 0–5)

## 2019-09-20 PROCEDURE — 1123F ACP DISCUSS/DSCN MKR DOCD: CPT | Performed by: INTERNAL MEDICINE

## 2019-09-20 PROCEDURE — 1090F PRES/ABSN URINE INCON ASSESS: CPT | Performed by: INTERNAL MEDICINE

## 2019-09-20 PROCEDURE — 90653 IIV ADJUVANT VACCINE IM: CPT | Performed by: INTERNAL MEDICINE

## 2019-09-20 PROCEDURE — G0008 ADMIN INFLUENZA VIRUS VAC: HCPCS | Performed by: INTERNAL MEDICINE

## 2019-09-20 PROCEDURE — G8427 DOCREV CUR MEDS BY ELIG CLIN: HCPCS | Performed by: INTERNAL MEDICINE

## 2019-09-20 PROCEDURE — 99214 OFFICE O/P EST MOD 30 MIN: CPT | Performed by: INTERNAL MEDICINE

## 2019-09-20 PROCEDURE — G8417 CALC BMI ABV UP PARAM F/U: HCPCS | Performed by: INTERNAL MEDICINE

## 2019-09-20 PROCEDURE — 1036F TOBACCO NON-USER: CPT | Performed by: INTERNAL MEDICINE

## 2019-09-20 PROCEDURE — 4040F PNEUMOC VAC/ADMIN/RCVD: CPT | Performed by: INTERNAL MEDICINE

## 2019-09-20 RX ORDER — MELOXICAM 7.5 MG/1
7.5 TABLET ORAL PRN
COMMUNITY
Start: 2007-01-22 | End: 2020-02-11

## 2019-09-20 RX ORDER — NITROGLYCERIN 0.4 MG/1
0.4 TABLET SUBLINGUAL EVERY 5 MIN PRN
Qty: 25 TABLET | Refills: 6 | Status: CANCELLED | OUTPATIENT
Start: 2019-09-20

## 2019-09-20 ASSESSMENT — ENCOUNTER SYMPTOMS
CHANGE IN BOWEL HABIT: 0
ABDOMINAL PAIN: 0
ORTHOPNEA: 0
BLOOD IN STOOL: 0
TROUBLE SWALLOWING: 0
SHORTNESS OF BREATH: 1
WHEEZING: 0
COUGH: 0
CHEST TIGHTNESS: 0
PHOTOPHOBIA: 0

## 2019-09-20 NOTE — PROGRESS NOTES
III LDL Classification is Optimal.   Orders Only on 09/20/2019   Component Date Value Ref Range Status    Sodium 09/20/2019 140  135 - 144 mEq/L Final    Potassium 09/20/2019 4.3  3.4 - 4.9 mEq/L Final    Chloride 09/20/2019 99  95 - 107 mEq/L Final    CO2 09/20/2019 30  20 - 31 mEq/L Final    Anion Gap 09/20/2019 11  9 - 15 mEq/L Final    Glucose 09/20/2019 89  70 - 99 mg/dL Final    BUN 09/20/2019 17  8 - 23 mg/dL Final    CREATININE 09/20/2019 0.54  0.50 - 0.90 mg/dL Final    GFR Non- 09/20/2019 >60.0  >60 Final    Comment: >60 mL/min/1.73m2 EGFR, calc. for ages 25 and older using the  MDRD formula (not corrected for weight), is valid for stable  renal function.  GFR  09/20/2019 >60.0  >60 Final    Comment: >60 mL/min/1.73m2 EGFR, calc. for ages 25 and older using the  MDRD formula (not corrected for weight), is valid for stable  renal function.       Calcium 09/20/2019 9.7  8.5 - 9.9 mg/dL Final    Total Protein 09/20/2019 7.6  6.3 - 8.0 g/dL Final    Alb 09/20/2019 4.4  3.5 - 4.6 g/dL Final    Total Bilirubin 09/20/2019 0.5  0.2 - 0.7 mg/dL Final    Alkaline Phosphatase 09/20/2019 75  40 - 130 U/L Final    ALT 09/20/2019 12  0 - 33 U/L Final    AST 09/20/2019 15  0 - 35 U/L Final    Globulin 09/20/2019 3.2  2.3 - 3.5 g/dL Final   Orders Only on 09/20/2019   Component Date Value Ref Range Status    RBC, UA 09/20/2019 0-2  0 - 2 /HPF Final    Bacteria, UA 09/20/2019 FEW* /HPF Final    Hyaline Casts, UA 09/20/2019 5-10  0 - 5 /HPF Final    WBC, UA 09/20/2019 6-10* 0 - 5 /HPF Final    Epi Cells 09/20/2019 10-20  0 - 5 /HPF Final   Orders Only on 09/20/2019   Component Date Value Ref Range Status    WBC 09/20/2019 6.1  4.8 - 10.8 K/uL Final    RBC 09/20/2019 4.66  4.20 - 5.40 M/uL Final    Hemoglobin 09/20/2019 14.2  12.0 - 16.0 g/dL Final    Hematocrit 09/20/2019 42.7  37.0 - 47.0 % Final    MCV 09/20/2019 91.8  82.0 - 100.0 fL Final    [facility] 09/20/2019

## 2019-09-22 LAB — URINE CULTURE, ROUTINE: NORMAL

## 2019-09-24 ENCOUNTER — TELEPHONE (OUTPATIENT)
Dept: INTERNAL MEDICINE CLINIC | Age: 84
End: 2019-09-24

## 2019-11-20 RX ORDER — AMLODIPINE BESYLATE 2.5 MG/1
2.5 TABLET ORAL
Qty: 90 TABLET | Refills: 1 | Status: SHIPPED | OUTPATIENT
Start: 2019-11-20 | End: 2020-02-11

## 2020-02-11 ENCOUNTER — OFFICE VISIT (OUTPATIENT)
Dept: INTERNAL MEDICINE CLINIC | Age: 85
End: 2020-02-11
Payer: MEDICARE

## 2020-02-11 ENCOUNTER — CARE COORDINATION (OUTPATIENT)
Dept: CARE COORDINATION | Age: 85
End: 2020-02-11

## 2020-02-11 VITALS
WEIGHT: 199 LBS | HEART RATE: 84 BPM | RESPIRATION RATE: 16 BRPM | TEMPERATURE: 97.7 F | HEIGHT: 65 IN | DIASTOLIC BLOOD PRESSURE: 98 MMHG | SYSTOLIC BLOOD PRESSURE: 153 MMHG | BODY MASS INDEX: 33.15 KG/M2 | OXYGEN SATURATION: 93 %

## 2020-02-11 PROCEDURE — 3023F SPIROM DOC REV: CPT | Performed by: INTERNAL MEDICINE

## 2020-02-11 PROCEDURE — 4040F PNEUMOC VAC/ADMIN/RCVD: CPT | Performed by: INTERNAL MEDICINE

## 2020-02-11 PROCEDURE — G8926 SPIRO NO PERF OR DOC: HCPCS | Performed by: INTERNAL MEDICINE

## 2020-02-11 PROCEDURE — 1123F ACP DISCUSS/DSCN MKR DOCD: CPT | Performed by: INTERNAL MEDICINE

## 2020-02-11 PROCEDURE — 1036F TOBACCO NON-USER: CPT | Performed by: INTERNAL MEDICINE

## 2020-02-11 PROCEDURE — 1090F PRES/ABSN URINE INCON ASSESS: CPT | Performed by: INTERNAL MEDICINE

## 2020-02-11 PROCEDURE — G8417 CALC BMI ABV UP PARAM F/U: HCPCS | Performed by: INTERNAL MEDICINE

## 2020-02-11 PROCEDURE — G8427 DOCREV CUR MEDS BY ELIG CLIN: HCPCS | Performed by: INTERNAL MEDICINE

## 2020-02-11 PROCEDURE — 99214 OFFICE O/P EST MOD 30 MIN: CPT | Performed by: INTERNAL MEDICINE

## 2020-02-11 PROCEDURE — G8482 FLU IMMUNIZE ORDER/ADMIN: HCPCS | Performed by: INTERNAL MEDICINE

## 2020-02-11 PROCEDURE — G8510 SCR DEP NEG, NO PLAN REQD: HCPCS | Performed by: INTERNAL MEDICINE

## 2020-02-11 RX ORDER — VALSARTAN 160 MG/1
320 TABLET ORAL NIGHTLY
Qty: 90 TABLET | Refills: 1 | Status: SHIPPED | OUTPATIENT
Start: 2020-02-11 | End: 2020-05-18

## 2020-02-11 RX ORDER — AMLODIPINE BESYLATE 2.5 MG/1
2.5 TABLET ORAL EVERY MORNING
Qty: 90 TABLET | Refills: 1
Start: 2020-02-11 | End: 2020-05-18

## 2020-02-11 RX ORDER — LEVOTHYROXINE SODIUM 0.05 MG/1
50 TABLET ORAL
Qty: 90 TABLET | Refills: 1 | Status: SHIPPED | OUTPATIENT
Start: 2020-02-11 | End: 2020-08-03 | Stop reason: SDUPTHER

## 2020-02-11 RX ORDER — PANTOPRAZOLE SODIUM 40 MG/1
40 TABLET, DELAYED RELEASE ORAL DAILY PRN
Qty: 90 TABLET | Refills: 1 | Status: SHIPPED | OUTPATIENT
Start: 2020-02-11 | End: 2020-05-18

## 2020-02-11 RX ORDER — ROSUVASTATIN CALCIUM 10 MG/1
TABLET, COATED ORAL
Qty: 90 TABLET | Refills: 1 | Status: SHIPPED | OUTPATIENT
Start: 2020-02-11 | End: 2020-05-18

## 2020-02-11 ASSESSMENT — ENCOUNTER SYMPTOMS
TROUBLE SWALLOWING: 0
EYE PAIN: 0
WHEEZING: 0
CHEST TIGHTNESS: 0
COUGH: 0
SHORTNESS OF BREATH: 1
BLOOD IN STOOL: 0
ABDOMINAL DISTENTION: 0
ORTHOPNEA: 0
ABDOMINAL PAIN: 0

## 2020-02-11 ASSESSMENT — PATIENT HEALTH QUESTIONNAIRE - PHQ9
SUM OF ALL RESPONSES TO PHQ9 QUESTIONS 1 & 2: 0
SUM OF ALL RESPONSES TO PHQ QUESTIONS 1-9: 0
2. FEELING DOWN, DEPRESSED OR HOPELESS: 0
1. LITTLE INTEREST OR PLEASURE IN DOING THINGS: 0
SUM OF ALL RESPONSES TO PHQ QUESTIONS 1-9: 0

## 2020-02-11 ASSESSMENT — COPD QUESTIONNAIRES: COPD: 1

## 2020-02-11 NOTE — PATIENT INSTRUCTIONS
loss of movement in your face, arm, or leg, especially on only one side of your body. ? Sudden vision changes. ? Sudden trouble speaking. ? Sudden confusion or trouble understanding simple statements. ? Sudden problems with walking or balance. ? A sudden, severe headache that is different from past headaches.    Call your doctor now or seek immediate medical care if:    · You have new or worse nausea and vomiting.     · You have new symptoms such as hearing loss or roaring in your ears.    Watch closely for changes in your health, and be sure to contact your doctor if:    · You are not getting better as expected.     · Your vertigo gets worse. Where can you learn more? Go to [email or web address] org and sign in to your [email or web address] account. Enter  in the [employer] research box to learn more about \"Benign Paroxysmal Positional Vertigo (BPPV): Care Instructions. \"     If you do not have an account, please click on the \"Sign Up Now\" link. Current as of: July 28, 2019  Content Version: 12.3  © 4824-6599 "[employer]. Care instructions adapted under license by [employer] . If you have questions about a medical condition or this instruction, always ask your healthcare professional. [place] [address] any warranty or liability for your use of this information. Patient Education        Vertigo: Care Instructions  Your Care Instructions    Vertigo is the feeling that you or your surroundings are moving when there is no actual movement. It is often described as a feeling of spinning, whirling, falling, or tilting. Vertigo may make you vomit or feel nauseated. You may have trouble standing or walking and may lose your balance. Vertigo is often related to an inner ear problem, but it can have other more serious causes. If vertigo continues, you may need more tests to find its cause. Follow-up care is a key part of your treatment and safety.  Be sure to make and go to all appointments, and call your doctor if you are having problems. It's also a good idea to know your test results and keep a list of the medicines you take. How can you care for yourself at home? · Do not lie flat on your back. Prop yourself up slightly. This may reduce the spinning feeling. Keep your eyes open. · Move slowly so that you do not fall. · If your doctor recommends medicine, take it exactly as directed. · Do not drive while you are having vertigo. Certain exercises, called Alonso-Daroff exercises, can help decrease vertigo. To do Alonso-Daroff exercises:  · Sit on the edge of a bed or sofa and quickly lie down on the side that causes the worst vertigo. Lie on your side with your ear down. · Stay in this position for at least 30 seconds or until the vertigo goes away. · Sit up. If this causes vertigo, wait for it to stop. · Repeat the procedure on the other side. · Repeat this 10 times. Do these exercises 2 times a day until the vertigo is gone. When should you call for help? Call 911 anytime you think you may need emergency care. For example, call if:    · You passed out (lost consciousness).     · You have symptoms of a stroke. These may include:  ? Sudden numbness, tingling, weakness, or loss of movement in your face, arm, or leg, especially on only one side of your body. ? Sudden vision changes. ? Sudden trouble speaking. ? Sudden confusion or trouble understanding simple statements. ? Sudden problems with walking or balance. ? A sudden, severe headache that is different from past headaches.    Call your doctor now or seek immediate medical care if:    · Vertigo occurs with a fever, a headache, or ringing in your ears.     · You have new or increased nausea and vomiting.    Watch closely for changes in your health, and be sure to contact your doctor if:    · Vertigo gets worse or happens more often.     · Vertigo has not gotten better after 2 weeks.    Where can you learn Now\" link. Current as of: July 28, 2019  Content Version: 12.3  © 1561-5838 [employer]. Care instructions adapted under license by [facility] ([facility]). If you have questions about a medical condition or this instruction, always ask your healthcare professional. [address] any warranty or liability for your use of this information. Patient Education        [place] Exercises for Vertigo: Care Instructions  Your Care Instructions  Simple exercises can help you regain your balance when you have vertigo. If you have Ménière's disease, benign paroxysmal positional vertigo (BPPV), or another inner ear problem, you may have vertigo off and on. Do these exercises first thing in the morning and before you go to bed. You might get dizzy when you first start them. If this happens, try to do them for at least 5 minutes. Do a group of exercises at a time, starting at the top of the list. It may take several weeks before you can do all the exercises without feeling dizzy. Follow-up care is a key part of your treatment and safety. Be sure to make and go to all appointments, and call your doctor if you are having problems. It's also a good idea to know your test results and keep a list of the medicines you take. How can you care for yourself at home? Exercise 1  While sitting on the side of the bed and holding your head still:  · Look up as far as you can. · Look down as far as you can. · Look from side to side as far as you can. · Stretch your arm straight out in front of you. Focus on your index finger. Continue to focus on your finger while you bring it to your nose. Exercise 2  While sitting on the side of the bed:  · Bring your head as far back as you can. · Bring your head forward to touch your chin to your chest.  · Turn your head from side to side. · Do these exercises first with your eyes open. Then try with your eyes closed.   Exercise 3  While sitting on the side of the

## 2020-02-11 NOTE — PROGRESS NOTES
Deanna Avila is a 80 y.o. female non-smoker, history of coronary artery disease, obesity, hypothyroidism, osteoarthritis generalized, who presents for evaluation of:     Chief Complaint   Patient presents with    Hypertension     has not been taking amlodipine (\"it made me feel sick\"), blood pressure in the home setting mostly in the 150 range    COPD     SOB with anxiety, when walking, when working in the garden, never received a portable oxygen tank    Rash     bleeding lesion on the outer upper left thigh       Interim history: The patient comes to the office visit today accompanied by her older daughter who does not express any specific concerns. Since the last office visit with me 5 months ago, the patient has not been in the emergency room or hospitalized. Continues to live independently. States her memory is fairly good. She had a few episodes of dizziness but overall this has stabilized. No falls and no other incidents with injuries. Medication refills requests were received by the office and done electronically. The following laboratory reports since the past visit were reviewed (the ones pertinent to today's visit were discussed with the patient/ caregiver): No visits with results within 3 Month(s) from this visit. Latest known visit with results is:   Orders Only on 09/20/2019   Component Date Value Ref Range Status    Urine Culture, Routine 09/20/2019    Final                    Value:>50,000 CFU/ml of mixed raghav  Multiple organisms isolated, no predominance. Culture  indicates probable contamination. Please review colony count  and clinical indications to determine if a repeat culture is  necessary. No further workup to be done. HPI:    Hypertension   This is a chronic problem. The current episode started more than 1 year ago. The problem is resistant. Associated symptoms include shortness of breath (exertional).  Pertinent negatives include no anxiety, chest pain, Lifestyle    Physical activity:     Days per week: Not on file     Minutes per session: Not on file    Stress: Not on file   Relationships    Social connections:     Talks on phone: Not on file     Gets together: Not on file     Attends Synagogue service: Not on file     Active member of club or organization: Not on file     Attends meetings of clubs or organizations: Not on file     Relationship status: Not on file    Intimate partner violence:     Fear of current or ex partner: Not on file     Emotionally abused: Not on file     Physically abused: Not on file     Forced sexual activity: Not on file   Other Topics Concern    Not on file   Social History Narrative    Born in [place] (grandparents came from [place], settled in [place])    ,  (X 2)    Lives in a house with two of her sons    7 children, 3 M, 1 F    Lost 61 yo son in Feb 2018 from lung cancer    Was  for the [employer], [employer], housework       Family History   Problem Relation Age of Onset    Diabetes Maternal Aunt     Osteoarthritis Father     Osteoarthritis Sister     Osteoarthritis Brother     Hypertension Mother     Heart Failure Mother         dec age 71, rheumatic       Family and socialhistory were reviewed and pertinent changes documented. ALLERGIES    Ciprofloxacin hcl; Lipitor [atorvastatin]; and Zetia [ezetimibe]    Current Outpatient Medications on File Prior to Visit   Medication Sig Dispense Refill    magnesium oxide (MAG-OX) 140 MG CAPS Take 140 mg by mouth daily      fluticasone (FLONASE) 50 MCG/ACT nasal spray INHALE 2 sprays Nasally daily. 16 g 2    Misc.  Devices MISC Portable Oxygen concentrator 1 Device 0    acetaminophen (TYLENOL) 500 MG tablet Take 500 mg by mouth every 6 hours as needed for Pain (1- 2 tabs in am) Indications: for pain      vitamin B-12 (CYANOCOBALAMIN) 1000 MCG tablet Take 1,000 mcg by mouth daily Indications: in am.      Cholecalciferol neurological deficits   Psychiatric:         Attention and Perception: She is attentive. Mood and Affect: Mood is not anxious or depressed. Speech: Speech normal.         Behavior: Behavior is not slowed or withdrawn. Behavior is cooperative. Thought Content: Thought content is not paranoid or delusional.         Cognition and Memory: She does not exhibit impaired recent memory or impaired remote memory. Judgment: Judgment normal. Judgment is not inappropriate. Comments: No signs of cognitive decline         Assessment:    Natan Camarillo was seen today for hypertension, copd and rash. Diagnoses and all orders for this visit:    Hypertension, uncontrolled              Resume amlodipine 2.5 mg daily in the morning, take valsartan 160 mg daily at bedtime. Call office with home blood pressure readings or if amlodipine continues to cause discomfort of any type. Continue to focus on more physical exercise and weight loss. If amlodipine not tolerated, may consider a low-dose beta-blocker      Chronic obstructive pulmonary disease, unspecified COPD type ([facility] [address]              Involved the nurse care coordinator helping patient obtain a portable oxygen tank. Will have to reevaluate need for maintenance inhaler such as ipratropium, should have pulmonary function test.  Unfortunately the patient is quite reluctant to new medication/testing at her age. Coronary artery disease involving native coronary artery of native heart without angina pectoris  -     Richardson Guo MD, Cardiology, [facility]    Skin lesion of left lower extremity  -     AFL - Hugo Coleman MD, Dermatology, [employer] [facility]    Other orders  -     rosuvastatin (CRESTOR) 10 MG tablet; TAKE 1 TABLET BY MOUTH EVERY DAY  -     sertraline (ZOLOFT) 50 MG tablet; Take 1 tablet by mouth daily  -     levothyroxine (SYNTHROID) 50 MCG tablet;  Take 1 tablet by mouth every morning (before breakfast)  -     valsartan (DIOVAN) 160 MG tablet; Take 2 tablets by mouth nightly  -     pantoprazole (PROTONIX) 40 MG tablet; Take 1 tablet by mouth daily as needed (heartburn)  -     aspirin 81 MG tablet; Take 1 tablet by mouth daily  -     amLODIPine (NORVASC) 2.5 MG tablet; Take 1 tablet by mouth every morning        Plan:    See all orders and comments in the assessment section. Reviewed with the patient (/and caregiver if present): current health status, medications, activities and diet. Today's diagnosis (in the context ofchronic problems) was discussed with patient (/and caregiver if present), questions answered. Patient counseled and encouraged re: daily effort to improve diet (a high fiber, low calorie, low sodium and caffeine diet, divided into 3 main meals daily) and exercise habits (more aerobic exercise as tolerated), better stress management, will result in improved health and help in better management of the current chronic conditions in the long run. Side effects, adverse effects of the medication prescribed (or refilled) today, treatment plan/ rationale and result expectations have (again) been discussed with the patient who expresses understanding and consents to proceed as outlined above. Additional advise included in the \"after visit summary\".        Orders Placed This Encounter   Medications    rosuvastatin (CRESTOR) 10 MG tablet     Sig: TAKE 1 TABLET BY MOUTH EVERY DAY     Dispense:  90 tablet     Refill:  1    sertraline (ZOLOFT) 50 MG tablet     Sig: Take 1 tablet by mouth daily     Dispense:  90 tablet     Refill:  1    levothyroxine (SYNTHROID) 50 MCG tablet     Sig: Take 1 tablet by mouth every morning (before breakfast)     Dispense:  90 tablet     Refill:  1    valsartan (DIOVAN) 160 MG tablet     Sig: Take 2 tablets by mouth nightly     Dispense:  90 tablet     Refill:  1    pantoprazole (PROTONIX) 40 MG tablet     Sig: Take 1 tablet by mouth daily as needed (heartburn)     Dispense:  90

## 2020-02-12 NOTE — CARE COORDINATION
Received request from pcp for assistance with obtaining portable oxygen. pcp also requested she be in care coordination. Call to [facility] and left message to return call.  Will mail intro letter

## 2020-02-20 ENCOUNTER — CARE COORDINATION (OUTPATIENT)
Dept: CARE COORDINATION | Age: 85
End: 2020-02-20

## 2020-02-20 NOTE — LETTER
2/21/2020     [address] [facility]    Dear Alec Palacios recently attempted to contact you to discuss your healthcare needs. My name is Lawrence Mena and I am a registered nurse who partners with Patrice Mcdaniels MD to improve patients health. As a member of your health care team, I would work with other providers involved in your care, offer education for your specific health conditions, and connect you with additional resources as needed. I will collaborate with Lorie Ivy MD to support you in following your treatment plan. The additional support I provide is no additional cost to you. My primary focus is to help you achieve specific goals and improve your health. I look forward to working with you. Please contact me at your earliest convenience at [phone].     In good health,    Lawrence Mena RN

## 2020-03-11 ENCOUNTER — CARE COORDINATION (OUTPATIENT)
Dept: CARE COORDINATION | Age: 85
End: 2020-03-11

## 2020-05-18 RX ORDER — AMLODIPINE BESYLATE 2.5 MG/1
TABLET ORAL
Qty: 90 TABLET | Refills: 1 | Status: SHIPPED | OUTPATIENT
Start: 2020-05-18 | End: 2020-08-03 | Stop reason: SDUPTHER

## 2020-05-18 RX ORDER — ROSUVASTATIN CALCIUM 10 MG/1
TABLET, COATED ORAL
Qty: 90 TABLET | Refills: 1 | Status: SHIPPED | OUTPATIENT
Start: 2020-05-18 | End: 2020-08-03 | Stop reason: SDUPTHER

## 2020-05-18 RX ORDER — FLUTICASONE PROPIONATE 50 MCG
SPRAY, SUSPENSION (ML) NASAL
Qty: 16 G | Refills: 2 | Status: SHIPPED | OUTPATIENT
Start: 2020-05-18 | End: 2020-08-03 | Stop reason: SDUPTHER

## 2020-05-18 RX ORDER — PANTOPRAZOLE SODIUM 40 MG/1
40 TABLET, DELAYED RELEASE ORAL DAILY PRN
Qty: 90 TABLET | Refills: 1 | Status: SHIPPED | OUTPATIENT
Start: 2020-05-18 | End: 2020-08-03 | Stop reason: SDUPTHER

## 2020-05-18 RX ORDER — VALSARTAN 160 MG/1
320 TABLET ORAL NIGHTLY
Qty: 90 TABLET | Refills: 1 | Status: SHIPPED | OUTPATIENT
Start: 2020-05-18 | End: 2020-06-22 | Stop reason: SDUPTHER

## 2020-05-18 NOTE — TELEPHONE ENCOUNTER
Pharmacy requesting medication refill. Please approve or deny this request.    Rx requested:  Requested Prescriptions     Pending Prescriptions Disp Refills    pantoprazole (PROTONIX) 40 MG tablet [Pharmacy Med Name: pantoprazole 40 mg tablet,delayed release] 90 tablet 1     Sig: Take 1 tablet by mouth daily as needed (heartburn)    rosuvastatin (CRESTOR) 10 MG tablet [Pharmacy Med Name: rosuvastatin 10 mg tablet] 90 tablet 1     Sig: TAKE 1 TABLET BY MOUTH EVERY DAY    amLODIPine (NORVASC) 2.5 MG tablet [Pharmacy Med Name: amlodipine 2.5 mg tablet] 90 tablet 1     Sig: TAKE 1 TABLET BY MOUTH DAILY (before lunch)    valsartan (DIOVAN) 160 MG tablet [Pharmacy Med Name: valsartan 160 mg tablet] 90 tablet 1     Sig: Take 2 tablets by mouth nightly    fluticasone (FLONASE) 50 MCG/ACT nasal spray [Pharmacy Med Name: fluticasone propionate 50 mcg/actuation nasal spray,suspension] 16 g 2     Sig: INHALE 2 sprays Nasally daily.          Last Office Visit:   2/11/2020      Next Visit Date:  Future Appointments   Date Time Provider Mary Parekh   6/12/2020 11:30 AM Sue Davenport MD [facility]

## 2020-06-05 ENCOUNTER — TELEPHONE (OUTPATIENT)
Dept: FAMILY MEDICINE CLINIC | Age: 85
End: 2020-06-05

## 2020-06-22 ENCOUNTER — OFFICE VISIT (OUTPATIENT)
Dept: FAMILY MEDICINE CLINIC | Age: 85
End: 2020-06-22
Payer: MEDICARE

## 2020-06-22 VITALS
OXYGEN SATURATION: 94 % | WEIGHT: 199 LBS | HEART RATE: 79 BPM | TEMPERATURE: 99.2 F | RESPIRATION RATE: 18 BRPM | HEIGHT: 65 IN | SYSTOLIC BLOOD PRESSURE: 132 MMHG | BODY MASS INDEX: 33.15 KG/M2 | DIASTOLIC BLOOD PRESSURE: 68 MMHG

## 2020-06-22 PROBLEM — I25.10 CORONARY ARTERY DISEASE INVOLVING NATIVE CORONARY ARTERY OF NATIVE HEART WITHOUT ANGINA PECTORIS: Status: ACTIVE | Noted: 2020-06-22

## 2020-06-22 PROBLEM — H81.10 BENIGN PAROXYSMAL POSITIONAL VERTIGO: Status: ACTIVE | Noted: 2020-06-22

## 2020-06-22 PROBLEM — Z91.81 AT HIGH RISK FOR FALLS: Status: ACTIVE | Noted: 2020-06-22

## 2020-06-22 PROCEDURE — G0438 PPPS, INITIAL VISIT: HCPCS | Performed by: PHYSICIAN ASSISTANT

## 2020-06-22 PROCEDURE — 1123F ACP DISCUSS/DSCN MKR DOCD: CPT | Performed by: PHYSICIAN ASSISTANT

## 2020-06-22 PROCEDURE — G0009 ADMIN PNEUMOCOCCAL VACCINE: HCPCS | Performed by: PHYSICIAN ASSISTANT

## 2020-06-22 PROCEDURE — 4040F PNEUMOC VAC/ADMIN/RCVD: CPT | Performed by: PHYSICIAN ASSISTANT

## 2020-06-22 PROCEDURE — 90732 PPSV23 VACC 2 YRS+ SUBQ/IM: CPT | Performed by: PHYSICIAN ASSISTANT

## 2020-06-22 RX ORDER — VALSARTAN 160 MG/1
320 TABLET ORAL NIGHTLY
Qty: 180 TABLET | Refills: 1 | Status: SHIPPED | OUTPATIENT
Start: 2020-06-22 | End: 2020-08-03 | Stop reason: SDUPTHER

## 2020-06-22 RX ORDER — ZOSTER VACCINE RECOMBINANT, ADJUVANTED 50 MCG/0.5
0.5 KIT INTRAMUSCULAR SEE ADMIN INSTRUCTIONS
Qty: 0.5 ML | Refills: 0 | Status: SHIPPED | OUTPATIENT
Start: 2020-06-22 | End: 2020-06-23

## 2020-06-22 RX ORDER — NITROGLYCERIN 0.4 MG/1
0.4 TABLET SUBLINGUAL EVERY 5 MIN PRN
Qty: 25 TABLET | Refills: 6 | Status: SHIPPED | OUTPATIENT
Start: 2020-06-22

## 2020-06-22 ASSESSMENT — PATIENT HEALTH QUESTIONNAIRE - PHQ9
SUM OF ALL RESPONSES TO PHQ QUESTIONS 1-9: 1
SUM OF ALL RESPONSES TO PHQ QUESTIONS 1-9: 1

## 2020-06-22 ASSESSMENT — ENCOUNTER SYMPTOMS
SHORTNESS OF BREATH: 0
COLOR CHANGE: 0
SINUS PAIN: 0
RHINORRHEA: 0
WHEEZING: 0
COUGH: 0
CHEST TIGHTNESS: 0
SINUS PRESSURE: 0

## 2020-06-22 ASSESSMENT — LIFESTYLE VARIABLES: HOW OFTEN DO YOU HAVE A DRINK CONTAINING ALCOHOL: 0

## 2020-06-22 NOTE — PROGRESS NOTES
day    At high risk for falls    Essential hypertension  -     valsartan (DIOVAN) 160 MG tablet; Take 2 tablets by mouth nightly    Primary osteoarthritis involving multiple joints  -     diclofenac sodium (VOLTAREN) 1 % GEL; Apply 4 g topically 4 times daily as needed for Pain (arthritis/joint pain)    Postnasal drip    Coronary artery disease involving native coronary artery of native heart without angina pectoris  -     nitroGLYCERIN (NITROSTAT) 0.4 MG SL tablet; Place 1 tablet under the tongue every 5 minutes as needed for Chest pain    Benign paroxysmal positional vertigo, unspecified laterality    Patient established care today. Discussed chronic medical history, AWV completed. Discussed arthritis and limitations with ADLs. Trial topical voltaren as chronic NSAID use not recommended in her age group. Will be due for labs at next follow up with me.   3 month follow up.     Orders Placed This Encounter   Procedures    Pneumococcal polysaccharide vaccine 23-valent greater than or equal to 3yo subcutaneous/IM     Orders Placed This Encounter   Medications    zoster recombinant adjuvanted vaccine (SHINGRIX) 50 MCG/0.5ML SUSR injection     Sig: Inject 0.5 mLs into the muscle See Admin Instructions for 1 day     Dispense:  0.5 mL     Refill:  0    nitroGLYCERIN (NITROSTAT) 0.4 MG SL tablet     Sig: Place 1 tablet under the tongue every 5 minutes as needed for Chest pain     Dispense:  25 tablet     Refill:  6    valsartan (DIOVAN) 160 MG tablet     Sig: Take 2 tablets by mouth nightly     Dispense:  180 tablet     Refill:  1    diclofenac sodium (VOLTAREN) 1 % GEL     Sig: Apply 4 g topically 4 times daily as needed for Pain (arthritis/joint pain)     Dispense:  2 Tube     Refill:  2     Medications Discontinued During This Encounter   Medication Reason    nitroGLYCERIN (NITROSTAT) 0.4 MG SL tablet REORDER    valsartan (DIOVAN) 160 MG tablet REORDER     Return in about 3 months (around 9/22/2020) for to take care of any of the following? Laundry, housekeeping, banking/finances, shopping, telephone use, food preparation, transportation, or taking medications?: (!) Transportation  ADL Interventions:  · family drives patient, does not want to be a bother, will refer to [address] [place] [address] Status  Immunization History   Administered Date(s) Administered    Influenza Vaccine, unspecified formulation 10/20/2015    Influenza Virus Vaccine 10/20/2014    Influenza, High Dose (Fluzone 65 yrs and older) 10/20/2015, 09/01/2017, 12/17/2018    Influenza, Triv, inactivated, subunit, adjuvanted, IM (Fluad 65 yrs and older) 09/20/2019    Tetanus 06/02/2015    Tetanus Toxoid, absorbed 06/02/2015        Health Maintenance   Topic Date Due    Shingles Vaccine (1 of 2) 06/19/1983    Pneumococcal 65+ years Vaccine (1 of 1 - PPSV23) 06/19/1998    Annual Wellness Visit (AWV)  05/29/2019    DTaP/Tdap/Td vaccine (1 - Tdap) 06/22/2021 (Originally 6/19/1952)    Lipid screen  09/20/2020    Potassium monitoring  09/20/2020    Creatinine monitoring  09/20/2020    Flu vaccine  Completed    Hepatitis A vaccine  Aged Out    Hepatitis B vaccine  Aged Out    Hib vaccine  Aged Out    Meningococcal (ACWY) vaccine  Aged Out     Recommendations for [employer] Due: see orders and patient instructions/AVS.  . Recommended screening schedule for the next 5-10 years is provided to the patient in written form: see Patient Shashank Lim was seen today for established [facility] doctor and medicare awv. Diagnoses and all orders for this visit:    Routine general medical examination at a health care facility    Need for pneumococcal vaccination  -     Pneumococcal polysaccharide vaccine 23-valent greater than or equal to 1yo subcutaneous/IM    Need for shingles vaccine  -     zoster recombinant adjuvanted vaccine [facility]) 50 MCG/0.5ML SUSR injection;  Inject 0.5 mLs

## 2020-06-22 NOTE — PATIENT INSTRUCTIONS
Personalized Preventive Plan for Mello Beard - 6/22/2020  Medicare offers a range of preventive health benefits. Some of the tests and screenings are paid in full while other may be subject to a deductible, co-insurance, and/or copay. Some of these benefits include a comprehensive review of your medical history including lifestyle, illnesses that may run in your family, and various assessments and screenings as appropriate. After reviewing your medical record and screening and assessments performed today your provider may have ordered immunizations, labs, imaging, and/or referrals for you. A list of these orders (if applicable) as well as your Preventive Care list are included within your After Visit Summary for your review. Other Preventive Recommendations:    · A preventive eye exam performed by an eye specialist is recommended every 1-2 years to screen for glaucoma; cataracts, macular degeneration, and other eye disorders. · A preventive dental visit is recommended every 6 months. · Try to get at least 150 minutes of exercise per week or 10,000 steps per day on a pedometer . · Order or download the FREE \"Exercise & Physical Activity: Your Everyday Guide\" from [employer]. Call 7-530.164.2534 or search [employer] online. · You need 2900-6408 mg of calcium and 0558-7128 IU of vitamin D per day. It is possible to meet your calcium requirement with diet alone, but a vitamin D supplement is usually necessary to meet this goal.  · When exposed to the sun, use a sunscreen that protects against both UVA and UVB radiation with an SPF of 30 or greater. Reapply every 2 to 3 hours or after sweating, drying off with a towel, or swimming. · Always wear a seat belt when traveling in a car. Always wear a helmet when riding a bicycle or motorcycle.

## 2020-06-23 ENCOUNTER — CARE COORDINATION (OUTPATIENT)
Dept: CARE COORDINATION | Age: 85
End: 2020-06-23

## 2020-06-24 ENCOUNTER — CARE COORDINATION (OUTPATIENT)
Dept: CARE COORDINATION | Age: 85
End: 2020-06-24

## 2020-06-26 ENCOUNTER — CARE COORDINATION (OUTPATIENT)
Dept: CARE COORDINATION | Age: 85
End: 2020-06-26

## 2020-06-29 ENCOUNTER — CARE COORDINATION (OUTPATIENT)
Dept: CARE COORDINATION | Age: 85
End: 2020-06-29

## 2020-07-06 ENCOUNTER — TELEPHONE (OUTPATIENT)
Dept: PHARMACY | Facility: CLINIC | Age: 85
End: 2020-07-06

## 2020-07-06 ENCOUNTER — CARE COORDINATION (OUTPATIENT)
Dept: CARE COORDINATION | Age: 85
End: 2020-07-06

## 2020-07-06 NOTE — CARE COORDINATION
Ambulatory Care Coordination Note  7/6/2020  CM Risk Score: 1  Charlson 10 Year Mortality Risk Score: 79%     ACC: María Hernandez, YURY    Summary Note: call to merary due to referral from pcp . She reports she is in need of assistance with transportation. She was utilizing the [facility], but due to covid they are no longer offering assistance. She is reporting she \"hurts all over\". She attributes the pain to arthritis. She states they heat makes it worse. She does have air conditioning but does not like it. She uses a fan. Informed her msw has been attempting to contact her. She states her phone does not take messages . She does not answer the phone if she does not recognize the number. Explained she will need to talk to msw to have assistance with transportation. She needs assistance for grocery and other errands. msw phone number provided and requested she answer phone call tomorrow. Will also place referral to pharmacy for review of meds. Discussed dx copd. She is not on any medications for that. She does use o2 prn . She has no shortness of breath or cough at this time. She is aware to report any. She has follow up with pcp scheduled. She states she fell a couple of weeks ago and had no injury. She lives with her son and he does assist financially. She receives food stamps    Ambulatory Care Coordination Assessment    Care Coordination Protocol  Program Enrollment:  Rising Risk  Referral from Primary Care Provider:  Yes  Week 1 - Initial Assessment     Do you have all of your prescriptions and are they filled?:  Yes  Barriers to medication adherence:  None  Are you able to afford your medications?:  Yes  How often do you have trouble taking your medications the way you have been told to take them?:  I always take them as prescribed.      Do you have Home O2 Therapy?:  Yes   Oxygen Regimen:  PRN Flow - Enter rate/FIO2:  3   Method of Delivery:  Nasal Cannula      Ability to seek help/take action for Emergent Urgent situations i.e. fire, crime, inclement weather or health crisis. :  Independent  Ability to ambulate to restroom:  Independent  Ability handle personal hygeine needs (bathing/dressing/grooming): Independent  Ability to manage Medications: Independent  Ability to prepare Food Preparation:  Independent  Ability to maintain home (clean home, laundry): Independent  Ability to drive and/or has transportation:  Dependent  Ability to do shopping:  Needs Assistance  Ability to manage finances: Independent  Is patient able to live independently?:  Yes     Current Housing:  Private Residence        Per the Fall Risk Screening, did the patient have 2 or more falls or 1 fall with injury in the past year?:  No     Frequent urination at night?:  No  Do you use rails/bars?:  Yes  Do you have a non-slip tub mat?:  Yes     Are you experiencing loss of meaning?:  No  Are you experiencing loss of hope and peace?:  No     Thinking about your patient's physical health needs, are there any symptoms or problems (risk indicators) you are unsure about that require further investigation?:  Moderate to severe symptoms or problems that impact on daily life   Are the patients physical health problems impacting on their mental well-being?:  Mild impact on mental well-being e.g. \"\"feeling fed-up\"\", \"\"reduced enjoyment\"\"   Are there any problems with your patients lifestyle behaviors (alcohol, drugs, diet, exercise) that are impacting on physical or mental well-being?:  No identified areas of concern   Do you have any other concerns about your patients mental well-being?  How would you rate their severity and impact on the patient?:  Mild problems - don't interfere with function   How would you rate their home environment in terms of safety and stability (including domestic violence, insecure housing, neighbor harassment)?:  Consistently safe, supportive, stable, no identified problems   How do daily activities impact on the patient's well-being? (include current or anticipated unemployment, work, caregiving, access to transportation or other): Contributes to low mood or stress at times   How would you rate their social network (family, work, friends)?:  Restricted participation with some degree of social isolation   How would you rate their financial resources (including ability to afford all required medical care)?:  Financially secure, some resource challenges   How wells does the patient now understand their health and well-being (symptoms, signs or risk factors) and what they need to do to manage their health?:  Reasonable to good understanding and already engages in managing health or is willing to undertake better management   How well do you think your patient can engage in healthcare discussions? (Barriers include language, deafness, aphasia, alcohol or drug problems, learning difficulties, concentration):  Clear and open communication, no identified barriers   Do other services need to be involved to help this patient?:  Other care/services not in place and required   Are current services involved with this patient well-coordinated? (Include coordination with other services you are now recommendation):  Required care/services missing and/or fragmented   Suggested Interventions and Community Resources   Pharmacist:  Completed   Social Work:  Completed   Transportation Services: In Process   Zone Management Tools: In Process         Set up/Review Goals, Set up/Review an Education Plan              Prior to Admission medications    Medication Sig Start Date End Date Taking?  Authorizing Provider   nitroGLYCERIN (NITROSTAT) 0.4 MG SL tablet Place 1 tablet under the tongue every 5 minutes as needed for Chest pain 6/22/20  Yes Tova Mazariegos PA-C   valsartan (DIOVAN) 160 MG tablet Take 2 tablets by mouth nightly 6/22/20  Yes Tova Mazariegos PA-C   diclofenac sodium (VOLTAREN) 1 % GEL Apply 4 g topically 4 times daily as needed for Pain (arthritis/joint pain) 6/22/20  Yes Tova Mazariegos PA-C   pantoprazole (PROTONIX) 40 MG tablet Take 1 tablet by mouth daily as needed (heartburn) 5/18/20  Yes Vaishali Diaz MD   rosuvastatin (CRESTOR) 10 MG tablet TAKE 1 TABLET BY MOUTH EVERY DAY 5/18/20  Yes Vaishali Diaz MD   amLODIPine (NORVASC) 2.5 MG tablet TAKE 1 TABLET BY MOUTH DAILY (before lunch) 5/18/20  Yes Vaishali Diaz MD   fluticasone (FLONASE) 50 MCG/ACT nasal spray INHALE 2 sprays Nasally daily. 5/18/20  Yes Vaishali Diaz MD   sertraline (ZOLOFT) 50 MG tablet Take 1 tablet by mouth daily 2/11/20  Yes Vaishali Diaz MD   levothyroxine (SYNTHROID) 50 MCG tablet Take 1 tablet by mouth every morning (before breakfast) 2/11/20  Yes Vaishali Diaz MD   aspirin 81 MG tablet Take 1 tablet by mouth daily 2/11/20  Yes Vaishali Diaz MD   magnesium oxide (MAG-OX) 140 MG CAPS Take 140 mg by mouth daily 1/22/07  Yes Historical Provider, MD   acetaminophen (TYLENOL) 500 MG tablet Take 500 mg by mouth every 6 hours as needed for Pain (1- 2 tabs in am) Indications: for pain   Yes Historical Provider, MD   vitamin B-12 (CYANOCOBALAMIN) 1000 MCG tablet Take 1,000 mcg by mouth daily Indications: in am.   Yes Historical Provider, MD   Cholecalciferol (VITAMIN D3) 2000 units CAPS Take 1,000 Units by mouth Daily with supper   Yes Historical Provider, MD   Multiple Vitamins-Minerals (MULTIVITAMIN PO) Take by mouth daily   Yes Historical Provider, MD   OXYGEN Inhale 3 L into the lungs continuous. Yes Historical Provider, MD   Misc.  Devices MISC Portable Oxygen concentrator 5/24/19   Radha Robledo MD       Future Appointments   Date Time Provider Mary Parekh   9/21/2020  9:45 AM Rach Green PA-C [facility]

## 2020-07-06 NOTE — TELEPHONE ENCOUNTER
Received a referral:  from Care Coordinator to review patients medications. Called patient to schedule a time to speak with a pharmacist over the telephone. Spoke to patient and advised them of the above message. Patient verified understanding and scheduled their appointment: tomorrow at [address].   Clinical Pharmacy   Toll free: [phone], option 7

## 2020-07-06 NOTE — TELEPHONE ENCOUNTER
----- Message from Bere Portillo RN sent at 7/6/2020  4:49 PM EDT -----  Hello, new admit to care coordination.  Thanks,  [employer]

## 2020-07-07 ENCOUNTER — CARE COORDINATION (OUTPATIENT)
Dept: CARE COORDINATION | Age: 85
End: 2020-07-07

## 2020-07-08 ENCOUNTER — SCHEDULED TELEPHONE ENCOUNTER (OUTPATIENT)
Dept: PHARMACY | Facility: CLINIC | Age: 85
End: 2020-07-08

## 2020-07-08 ENCOUNTER — CARE COORDINATION (OUTPATIENT)
Dept: CARE COORDINATION | Age: 85
End: 2020-07-08

## 2020-07-08 RX ORDER — POLYETHYLENE GLYCOL 3350 17 G/17G
17 POWDER, FOR SOLUTION ORAL DAILY PRN
COMMUNITY

## 2020-07-08 RX ORDER — DOCUSATE SODIUM 100 MG/1
100 CAPSULE, LIQUID FILLED ORAL DAILY
COMMUNITY
End: 2020-08-03

## 2020-07-13 ENCOUNTER — CARE COORDINATION (OUTPATIENT)
Dept: CARE COORDINATION | Age: 85
End: 2020-07-13

## 2020-07-20 ENCOUNTER — CARE COORDINATION (OUTPATIENT)
Dept: CARE COORDINATION | Age: 85
End: 2020-07-20

## 2020-07-24 ENCOUNTER — CARE COORDINATION (OUTPATIENT)
Dept: CARE COORDINATION | Age: 85
End: 2020-07-24

## 2020-07-24 NOTE — CARE COORDINATION
Telephone call with patient. She spoke of her recent fall on the front porch. Son who lives with her came downstairs and found her on the porch. Patient called 911 to get help getting up. Discussed transportation issue and she is still in need of assistance to get to grocery store. Family take her to the store but she does not like asking them. Son who lives with her does not have a car. [employer] is not running at this time due to [address]. Discussed that there is not a program that she would not have to pay for to take her to do errands. Encouraged patient to contact [facility] on Aging to do intake for transportation assistance to medical appointments.

## 2020-07-27 ENCOUNTER — CARE COORDINATION (OUTPATIENT)
Dept: CARE COORDINATION | Age: 85
End: 2020-07-27

## 2020-07-27 ENCOUNTER — HOSPITAL ENCOUNTER (EMERGENCY)
Age: 85
Discharge: HOME | End: 2020-07-27
Attending: EMERGENCY MEDICINE
Payer: MEDICARE

## 2020-07-27 ENCOUNTER — APPOINTMENT (OUTPATIENT)
Dept: CT IMAGING | Age: 85
End: 2020-07-27
Payer: MEDICARE

## 2020-07-27 VITALS
HEIGHT: 65 IN | WEIGHT: 199 LBS | RESPIRATION RATE: 16 BRPM | SYSTOLIC BLOOD PRESSURE: 151 MMHG | OXYGEN SATURATION: 91 % | DIASTOLIC BLOOD PRESSURE: 88 MMHG | HEART RATE: 88 BPM | BODY MASS INDEX: 33.15 KG/M2 | TEMPERATURE: 98.9 F

## 2020-07-27 LAB
ANION GAP SERPL CALCULATED.3IONS-SCNC: 16 MEQ/L (ref 9–15)
BASOPHILS ABSOLUTE: 0.1 K/UL (ref 0–0.2)
BASOPHILS RELATIVE PERCENT: 0.9 %
BUN BLDV-MCNC: 24 MG/DL (ref 8–23)
CALCIUM SERPL-MCNC: 9.3 MG/DL (ref 8.5–9.9)
CHLORIDE BLD-SCNC: 100 MEQ/L (ref 95–107)
CO2: 25 MEQ/L (ref 20–31)
CREAT SERPL-MCNC: 0.77 MG/DL (ref 0.5–0.9)
EOSINOPHILS ABSOLUTE: 0.2 K/UL (ref 0–0.7)
EOSINOPHILS RELATIVE PERCENT: 3.3 %
GFR AFRICAN AMERICAN: >60
GFR NON-AFRICAN AMERICAN: >60
GLUCOSE BLD-MCNC: 100 MG/DL (ref 70–99)
HCT VFR BLD CALC: 44.3 % (ref 37–47)
HEMOGLOBIN: 14.8 G/DL (ref 12–16)
LYMPHOCYTES ABSOLUTE: 1.6 K/UL (ref 1–4.8)
LYMPHOCYTES RELATIVE PERCENT: 25.4 %
MCH RBC QN AUTO: 30.3 PG (ref 27–31.3)
MCHC RBC AUTO-ENTMCNC: 33.3 % (ref 33–37)
MCV RBC AUTO: 91 FL (ref 82–100)
MONOCYTES ABSOLUTE: 0.7 K/UL (ref 0.2–0.8)
MONOCYTES RELATIVE PERCENT: 11.3 %
NEUTROPHILS ABSOLUTE: 3.8 K/UL (ref 1.4–6.5)
NEUTROPHILS RELATIVE PERCENT: 59.1 %
PDW BLD-RTO: 14.5 % (ref 11.5–14.5)
PLATELET # BLD: 179 K/UL (ref 130–400)
POTASSIUM SERPL-SCNC: 3.9 MEQ/L (ref 3.4–4.9)
RBC # BLD: 4.87 M/UL (ref 4.2–5.4)
SODIUM BLD-SCNC: 141 MEQ/L (ref 135–144)
WBC # BLD: 6.4 K/UL (ref 4.8–10.8)

## 2020-07-27 PROCEDURE — 80048 BASIC METABOLIC PNL TOTAL CA: CPT

## 2020-07-27 PROCEDURE — 85025 COMPLETE CBC W/AUTO DIFF WBC: CPT

## 2020-07-27 PROCEDURE — 36415 COLL VENOUS BLD VENIPUNCTURE: CPT

## 2020-07-27 PROCEDURE — 99284 EMERGENCY DEPT VISIT MOD MDM: CPT

## 2020-07-27 PROCEDURE — 72125 CT NECK SPINE W/O DYE: CPT

## 2020-07-27 PROCEDURE — 70450 CT HEAD/BRAIN W/O DYE: CPT

## 2020-07-27 PROCEDURE — 6830039000 HC L3 TRAUMA ALERT

## 2020-07-27 NOTE — ED NOTES
Pt from home via [address] [place] [address],[address]. She had fallen over a week ago and hit her head. She did not received treatment at that time. She spoke to her doctor today who advised her to come to ER to get checked. While walking to her car she lost her balance and fell. She fell backwards and landed on her butt. She did not hit her head, did not lose consciousness. Pt denies dizziness, chest pain, shortness of breath now or at time of fall. Pt does not take blood thinners; just 81mg aspirin daily. Pt states she loses her balance \"occasionally\" and falls \"maybe a few times. \"  Pt is alert and oriented. Denies any pain at this time.      Disha Velazquez RN  07/27/20 9759

## 2020-07-27 NOTE — ED NOTES
Care assumed. Pt alert and oriented x 4. Skin pink, warm, dry. Respirations even and unlabored. No distress noted at this time. PERRLA. Pt denies pain at this time. States she fell one week ago and her doctor told her to come get a brain scan to make sure she is ok. States she then fell in her driveway, landed on her butt.   Denies pain, LOC, injuries       Keven Ryan RN  07/27/20 1929

## 2020-07-27 NOTE — CARE COORDINATION
Ambulatory Care Coordination Note  7/27/2020  CM Risk Score: 1  Charlson 10 Year Mortality Risk Score: 79%     ACC: Carmelina Salas, YURY    Summary Note: call to merary to cont health coaching. She reports she had recent fall. She hit the back of her head and had \" a goose egg\". She refused ER at that time. She has been having headaches intermittently. Advised ER evaluation . She continues to refuse. Scheduled for pcp appt and message to pcp. She reports no decline in resp status. Advised if dizziness or headaches or other neuro symptoms she has to go to ER. Received message back from pcp to have her evaluated in the ER. Call back to [employer] to inform of this. She will have family take her. COPD Assessment    Does the patient understand envrionmental exposure?:  No  Is the patient able to verbalize Rescue vs. Long Acting medications?:  No  Does the patient have a nebulizer?:  No  Does the patient use a space with inhaled medications?:  No     No patient-reported symptoms         Symptoms:      Have you had a recent diagnosis of pneumonia either by PCP or at a hospital?:  No             Care Coordination Interventions    Program Enrollment:  Rising Risk  Referral from Primary Care Provider:  Yes  Suggested Interventions and Community Resources  Pharmacist:  Completed  Social Work:  Completed  Transportation Support: In Process  Zone Management Tools: In Process         Goals Addressed                 This Visit's Progress     Conditions and Symptoms   Worsening     I will notify my provider of any symptoms that indicate a worsening of my condition. Barriers: impairment:  physical: pain , financial, lack of support and stress  Plan for overcoming my barriers: care coordination, pharmacy and msw referral   Confidence: 7/10  Anticipated Goal Completion Date: 10/6/20              Prior to Admission medications    Medication Sig Start Date End Date Taking?  Authorizing Provider   sertraline (ZOLOFT) 50 MG tablet Take 50 mg by mouth every evening    Historical Provider, MD   docusate sodium (COLACE) 100 MG capsule Take 100 mg by mouth daily    Historical Provider, MD   polyethylene glycol (GLYCOLAX) 17 GM/SCOOP powder Take 17 g by mouth daily as needed (constipation)    Historical Provider, MD   nitroGLYCERIN (NITROSTAT) 0.4 MG SL tablet Place 1 tablet under the tongue every 5 minutes as needed for Chest pain 6/22/20   Tova Mazariegos PA-C   valsartan (DIOVAN) 160 MG tablet Take 2 tablets by mouth nightly 6/22/20   Tova Mazariegos PA-C   diclofenac sodium (VOLTAREN) 1 % GEL Apply 4 g topically 4 times daily as needed for Pain (arthritis/joint pain) 6/22/20   Tova Mazariegos PA-C   pantoprazole (PROTONIX) 40 MG tablet Take 1 tablet by mouth daily as needed (heartburn) 5/18/20   Martinez Jones MD   rosuvastatin (CRESTOR) 10 MG tablet TAKE 1 TABLET BY MOUTH EVERY DAY 5/18/20   Martinez Jones MD   amLODIPine (NORVASC) 2.5 MG tablet TAKE 1 TABLET BY MOUTH DAILY (before lunch) 5/18/20   Martinez Jones MD   fluticasone (FLONASE) 50 MCG/ACT nasal spray INHALE 2 sprays Nasally daily. 5/18/20   Martinez Jones MD   levothyroxine (SYNTHROID) 50 MCG tablet Take 1 tablet by mouth every morning (before breakfast) 2/11/20   Martinez Jones MD   aspirin 81 MG tablet Take 1 tablet by mouth daily 2/11/20   Martinez Jones MD   magnesium oxide (MAG-OX) 140 MG CAPS Take 140 mg by mouth daily 1/22/07   Historical Provider, MD   Misc.  Devices MISC Portable Oxygen concentrator 5/24/19   Reba Craven MD   acetaminophen (TYLENOL) 500 MG tablet Take 500 mg by mouth every 6 hours as needed for Pain (1- 2 tabs in am) Indications: for pain    Historical Provider, MD   vitamin B-12 (CYANOCOBALAMIN) 1000 MCG tablet Take 1,000 mcg by mouth daily Indications: in am.    Historical Provider, MD   Cholecalciferol (VITAMIN D3) 2000 units CAPS Take 2,000 Units by mouth Daily with supper     Historical Provider, MD   Multiple Vitamins-Minerals (MULTIVITAMIN PO) Take 1 tablet by mouth daily     Historical Provider, MD   OXYGEN Inhale 3 L into the lungs continuous Uses prn at bedtime    Historical Provider, MD       Future Appointments   Date Time Provider Mary Parekh   8/3/2020  1:45 PM MARY De La Fuente [facility]   9/21/2020  9:45 AM Delfino Sneed PA-C [address] [place] [address]

## 2020-07-27 NOTE — ED NOTES
Pt given warm blanket. Call bell in reach.   Updated regarding plan of care     Jc Zapata RN  07/27/20 2808

## 2020-07-27 NOTE — ED NOTES
Bed: 04  Expected date:   Expected time:   Means of arrival:   Comments:  87f  Fall, no LOC or blood thinners, 149/79, 90, 99ra     Alexis Canada RN  07/27/20 1907

## 2020-07-28 NOTE — ED PROVIDER NOTES
[facility] ED  EMERGENCY DEPARTMENT ENCOUNTER      Pt Name: Benedicto Avila  MRN: [id number]  Theogfjoseph 6/19/1933  Date of evaluation: 7/27/2020  Provider: Zaida Benitez MD    CHIEF COMPLAINT     No chief complaint on file. HISTORY OF PRESENT ILLNESS   (Location/Symptom, Timing/Onset, Context/Setting, Quality, Duration, Modifying Factors, Severity)  Note limiting factors. 51-year-old female presenting after a mechanical fall. Patient fell several days ago and mentioned it to her doctor who recommended she get a CT scan. While patient was on her way here she lost her balance and fell again. She did not hit her head or lose consciousness. She notes no pain at present. She walks with a walker for gait instability. She is not on blood thinners. No vomiting or noted weakness. Nursing Notes were reviewed. REVIEW OF SYSTEMS    (2-9 systems for level 4, 10 or more for level 5)     Review of Systems   All other systems reviewed and are negative. Except as noted above the remainder of the review of systems was reviewed and negative. PAST MEDICAL HISTORY     Past Medical History:   Diagnosis Date    BPV (benign positional vertigo) 2019    CAD (coronary artery disease)     Dr Margie Glynn COPD (chronic obstructive pulmonary disease) ([facility] [address] 2012, VQ scan and CXR    has supplemental oxygen, uses it for 4-6 hours nightly, no inhalers use    Diverticulosis     Hiatal hernia     Hiatal hernia with gastroesophageal reflux 2015    EGD Dr Ele Rosado, [facility]    History of blood transfusion     History of colonoscopy with polypectomy 2017    Dr Salas [place] History of sleep apnea 2011    no CPAP use    Hypertension     Hypothyroidism     Nocturnal hypoxia 2011    nocturnal O2, 3 LNC, 4-6 hours nightly    Obesity (BMI 30-39. 9)     Osteoarthritis of multiple joints     Oxygen dependent 2019    Postnasal drip     Sigmoid diverticulosis     Weakness of both legs          SURGICAL HISTORY       Past Surgical History:   Procedure Laterality Date    HYSTERECTOMY      fibroid, bleeding    JOINT REPLACEMENT      WI COLON CA SCRN NOT HI RSK IND N/A 9/1/2017    COLONOSCOPY performed by Chantal Burkett MD at [address] [facility] ESOPHAGOGASTRODUODENOSCOPY TRANSORAL DIAGNOSTIC N/A 9/1/2017    EGD ESOPHAGOGASTRODUODENOSCOPY performed by Chantal Burkett MD at [address], [address] Right 04/03/2012    TOTAL KNEE ARTHROPLASTY Bilateral     left x 2, right x1 (Dr Cam Posey)   [facility] ENDOSCOPY  12/29/15    Dr. Mack Florez       Current Discharge Medication List      CONTINUE these medications which have NOT CHANGED    Details   sertraline (ZOLOFT) 50 MG tablet Take 50 mg by mouth every evening      docusate sodium (COLACE) 100 MG capsule Take 100 mg by mouth daily      polyethylene glycol (GLYCOLAX) 17 GM/SCOOP powder Take 17 g by mouth daily as needed (constipation)      nitroGLYCERIN (NITROSTAT) 0.4 MG SL tablet Place 1 tablet under the tongue every 5 minutes as needed for Chest pain  Qty: 25 tablet, Refills: 6    Associated Diagnoses: Coronary artery disease involving native coronary artery of native heart without angina pectoris      valsartan (DIOVAN) 160 MG tablet Take 2 tablets by mouth nightly  Qty: 180 tablet, Refills: 1    Associated Diagnoses: Essential hypertension      diclofenac sodium (VOLTAREN) 1 % GEL Apply 4 g topically 4 times daily as needed for Pain (arthritis/joint pain)  Qty: 2 Tube, Refills: 2    Associated Diagnoses: Primary osteoarthritis involving multiple joints      pantoprazole (PROTONIX) 40 MG tablet Take 1 tablet by mouth daily as needed (heartburn)  Qty: 90 tablet, Refills: 1      rosuvastatin (CRESTOR) 10 MG tablet TAKE 1 TABLET BY MOUTH EVERY DAY  Qty: 90 tablet, Refills: 1      amLODIPine (NORVASC) 2.5 MG tablet TAKE 1 TABLET BY MOUTH DAILY (before lunch)  Qty: 90 tablet, Refills: 1      fluticasone (FLONASE) 50 MCG/ACT nasal spray INHALE 2 sprays Nasally daily. Qty: 16 g, Refills: 2      levothyroxine (SYNTHROID) 50 MCG tablet Take 1 tablet by mouth every morning (before breakfast)  Qty: 90 tablet, Refills: 1      aspirin 81 MG tablet Take 1 tablet by mouth daily  Qty: 90 tablet, Refills: 3      magnesium oxide (MAG-OX) 140 MG CAPS Take 140 mg by mouth daily      Misc. Devices MISC Portable Oxygen concentrator  Qty: 1 Device, Refills: 0    Associated Diagnoses: Chronic obstructive pulmonary disease, unspecified COPD type (HCC)      acetaminophen (TYLENOL) 500 MG tablet Take 500 mg by mouth every 6 hours as needed for Pain (1- 2 tabs in am) Indications: for pain      vitamin B-12 (CYANOCOBALAMIN) 1000 MCG tablet Take 1,000 mcg by mouth daily Indications: in am.      Cholecalciferol (VITAMIN D3) 2000 units CAPS Take 2,000 Units by mouth Daily with supper       Multiple Vitamins-Minerals (MULTIVITAMIN PO) Take 1 tablet by mouth daily       OXYGEN Inhale 3 L into the lungs continuous Uses prn at bedtime             ALLERGIES     Ciprofloxacin hcl; Lipitor [atorvastatin]; and Zetia [ezetimibe]    FAMILY HISTORY       Family History   Problem Relation Age of Onset    Diabetes Maternal Aunt     Osteoarthritis Father     Osteoarthritis Sister     Osteoarthritis Brother     Hypertension Mother     Heart Failure Mother         dec age 71, rheumatic          SOCIAL HISTORY       Social History     Socioeconomic History    Marital status:       Spouse name: Not on file    Number of children: 9    Years of education: Not on file    Highest education level: Not on file   Occupational History    Occupation: homemaker and , retired   Social Needs    Financial resource strain: Not on file    Food insecurity     Worry: Not on file     Inability: Not on file   [employer] needs     Medical: Not on file     Non-medical: Not on file   Tobacco Use    Smoking status: Former Smoker    membranes are moist.      Pharynx: Oropharynx is clear. Eyes:      Extraocular Movements: Extraocular movements intact. Conjunctiva/sclera: Conjunctivae normal.      Pupils: Pupils are equal, round, and reactive to light. Neck:      Musculoskeletal: Normal range of motion and neck supple. Cardiovascular:      Rate and Rhythm: Normal rate and regular rhythm. Pulmonary:      Effort: Pulmonary effort is normal.      Breath sounds: Normal breath sounds. Abdominal:      General: Bowel sounds are normal.      Palpations: Abdomen is soft. Musculoskeletal: Normal range of motion. General: No deformity. Skin:     General: Skin is warm and dry. Capillary Refill: Capillary refill takes less than 2 seconds. Neurological:      General: No focal deficit present. Mental Status: She is alert and oriented to person, place, and time. Mental status is at baseline. Cranial Nerves: No cranial nerve deficit. Psychiatric:         Thought Content: Thought content normal.         DIAGNOSTIC RESULTS     EKG: All EKG's are interpreted by the Emergency Department Physician who either signs or Co-signs this chart in the absence of a cardiologist.    RADIOLOGY:   Non-plain film images such as CT, Ultrasound and MRI are read by the radiologist. Plain radiographic images are visualized and preliminarily interpreted by the emergency physician with the below findings:    Interpretation per the Radiologist below, if available at the time of this note:    CT Head WO Contrast   Final Result      NO ACUTE INTRA-AXIAL OR EXTRA-AXIAL FINDINGS. All CT scans at this facility use dose modulation, iterative reconstruction, and/or weight based dosing when appropriate to reduce radiation dose to as low as reasonably achievable. CT HEAD WO CONTRAST, CT CERVICAL SPINE WO CONTRAST      CLINICAL HISTORY: Generalized pain.  Arch Alysha one week ago COMPARISON: NONE      Findings:      Multiple serial axial images of the cervical spine from the base of the skull through the upper thoracic vertebra with both sagittal and coronal reconstructions was performed. There is straightening of the normal expected cervical lordosis. There is multilevel degenerative joint disease. Prevertebral  soft tissues are  unremarkable. The disk spaces are diffusely narrowed. No acute fractures or spondylo-listhesis. .      IMPRESSION:      NO ACUTE FRACTURES. All CT scans at this facility use dose modulation, iterative reconstruction, and/or weight based dosing when appropriate to reduce radiation dose to as low as reasonably achievable. CT CERVICAL SPINE WO CONTRAST   Final Result      NO ACUTE INTRA-AXIAL OR EXTRA-AXIAL FINDINGS. All CT scans at this facility use dose modulation, iterative reconstruction, and/or weight based dosing when appropriate to reduce radiation dose to as low as reasonably achievable. CT HEAD WO CONTRAST, CT CERVICAL SPINE WO CONTRAST      CLINICAL HISTORY: Generalized pain. Nan Bibles one week ago COMPARISON: NONE      Findings:      Multiple serial axial images of the cervical spine from the base of the skull through the upper thoracic vertebra with both sagittal and coronal reconstructions was performed. There is straightening of the normal expected cervical lordosis. There is multilevel degenerative joint disease. Prevertebral  soft tissues are  unremarkable. The disk spaces are diffusely narrowed. No acute fractures or spondylo-listhesis. .      IMPRESSION:      NO ACUTE FRACTURES. All CT scans at this facility use dose modulation, iterative reconstruction, and/or weight based dosing when appropriate to reduce radiation dose to as low as reasonably achievable.                 LABS:  Labs Reviewed   BASIC METABOLIC PANEL - Abnormal; Notable for the following components:       Result Value    Anion Gap 16 (*)     Glucose 100 (*)     BUN 24 (*)     All other components within normal limits   CBC WITH AUTO DIFFERENTIAL       All other labs were within normal range or not returned as of this dictation. EMERGENCY DEPARTMENT COURSE and DIFFERENTIAL DIAGNOSIS/MDM:   Vitals:    Vitals:    07/27/20 1914 07/27/20 1914 07/27/20 1915   BP: (!) 151/88     Pulse: 88     Resp: 16     Temp:  98.9 °F (37.2 °C)    TempSrc:  Oral    SpO2: 91%     Weight:   199 lb (90.3 kg)   Height:   5' 5\" (1.651 m)       MDM  Number of Diagnoses or Management Options  Fall, initial encounter:   Diagnosis management comments: Presents after mechanical fall x2. Workup/ imaging unremarkable. Well appearing on physical exam and without complaints. Patient will be discharged home in good condition. Patient has been hemodynamically stable throughout ED course and is appropriate for outpatient follow up. Patient should follow up with PCP in 2-3 days or return to ED immediately for any new or worsening symptoms. Patient is well appearing on discharge and agreeable with plan of care. Patient Progress  Patient progress: stable      Procedures    CRITICAL CARE TIME   Total Critical Care time was 0 minutes, excluding separately reportable procedures. There was a high probability of clinically significant/life threatening deterioration in the patient's condition which required my urgent intervention. FINAL IMPRESSION      1.  Fall, initial encounter          DISPOSITION/PLAN   DISPOSITION Decision To Discharge 07/27/2020 08:37:08 PM      (Please note that portions of this note were completed with a voice recognition program.  Efforts were made to edit the dictations but occasionally words are mis-transcribed.)    Rabia Whatley MD (electronically signed)  Attending Emergency Physician        Rabia Whatley MD  07/27/20 2048

## 2020-08-03 ENCOUNTER — OFFICE VISIT (OUTPATIENT)
Dept: FAMILY MEDICINE CLINIC | Age: 85
End: 2020-08-03
Payer: MEDICARE

## 2020-08-03 VITALS
HEART RATE: 84 BPM | BODY MASS INDEX: 33.39 KG/M2 | HEIGHT: 65 IN | RESPIRATION RATE: 16 BRPM | DIASTOLIC BLOOD PRESSURE: 80 MMHG | SYSTOLIC BLOOD PRESSURE: 118 MMHG | TEMPERATURE: 97.1 F | OXYGEN SATURATION: 98 % | WEIGHT: 200.4 LBS

## 2020-08-03 PROCEDURE — G8427 DOCREV CUR MEDS BY ELIG CLIN: HCPCS | Performed by: PHYSICIAN ASSISTANT

## 2020-08-03 PROCEDURE — 1090F PRES/ABSN URINE INCON ASSESS: CPT | Performed by: PHYSICIAN ASSISTANT

## 2020-08-03 PROCEDURE — 1036F TOBACCO NON-USER: CPT | Performed by: PHYSICIAN ASSISTANT

## 2020-08-03 PROCEDURE — 1123F ACP DISCUSS/DSCN MKR DOCD: CPT | Performed by: PHYSICIAN ASSISTANT

## 2020-08-03 PROCEDURE — 99215 OFFICE O/P EST HI 40 MIN: CPT | Performed by: PHYSICIAN ASSISTANT

## 2020-08-03 PROCEDURE — 93000 ELECTROCARDIOGRAM COMPLETE: CPT | Performed by: PHYSICIAN ASSISTANT

## 2020-08-03 PROCEDURE — 4040F PNEUMOC VAC/ADMIN/RCVD: CPT | Performed by: PHYSICIAN ASSISTANT

## 2020-08-03 PROCEDURE — G8417 CALC BMI ABV UP PARAM F/U: HCPCS | Performed by: PHYSICIAN ASSISTANT

## 2020-08-03 RX ORDER — AMLODIPINE BESYLATE 2.5 MG/1
TABLET ORAL
Qty: 90 TABLET | Refills: 1 | Status: SHIPPED | OUTPATIENT
Start: 2020-08-03 | End: 2020-11-09

## 2020-08-03 RX ORDER — FLUTICASONE PROPIONATE 50 MCG
SPRAY, SUSPENSION (ML) NASAL
Qty: 16 G | Refills: 2 | Status: SHIPPED | OUTPATIENT
Start: 2020-08-03 | End: 2020-08-10

## 2020-08-03 RX ORDER — FEXOFENADINE HCL 180 MG/1
180 TABLET ORAL DAILY
Qty: 90 TABLET | Refills: 1 | Status: SHIPPED | OUTPATIENT
Start: 2020-08-03 | End: 2020-09-21

## 2020-08-03 RX ORDER — LEVOTHYROXINE SODIUM 0.05 MG/1
50 TABLET ORAL
Qty: 90 TABLET | Refills: 3 | Status: SHIPPED | OUTPATIENT
Start: 2020-08-03 | End: 2021-05-11

## 2020-08-03 RX ORDER — PANTOPRAZOLE SODIUM 40 MG/1
40 TABLET, DELAYED RELEASE ORAL DAILY PRN
Qty: 90 TABLET | Refills: 3 | Status: SHIPPED | OUTPATIENT
Start: 2020-08-03 | End: 2021-05-11

## 2020-08-03 RX ORDER — ROSUVASTATIN CALCIUM 10 MG/1
TABLET, COATED ORAL
Qty: 90 TABLET | Refills: 3 | Status: SHIPPED | OUTPATIENT
Start: 2020-08-03 | End: 2021-05-11

## 2020-08-03 RX ORDER — VALSARTAN 160 MG/1
320 TABLET ORAL NIGHTLY
Qty: 180 TABLET | Refills: 3 | Status: SHIPPED | OUTPATIENT
Start: 2020-08-03 | End: 2021-05-11

## 2020-08-03 ASSESSMENT — PATIENT HEALTH QUESTIONNAIRE - PHQ9
SUM OF ALL RESPONSES TO PHQ QUESTIONS 1-9: 0
2. FEELING DOWN, DEPRESSED OR HOPELESS: 0
SUM OF ALL RESPONSES TO PHQ QUESTIONS 1-9: 0
SUM OF ALL RESPONSES TO PHQ9 QUESTIONS 1 & 2: 0
1. LITTLE INTEREST OR PLEASURE IN DOING THINGS: 0

## 2020-08-03 NOTE — PROGRESS NOTES
Subjective  Carolina Roberson, 80 y.o. female presents today with:  Chief Complaint   Patient presents with   Atha Jerel Fall     2 times last week      HPI  Sofia Mcgill is in the office today for fall follow up  Last OV with me: 6/22/2020  In the office today with daughter. ER follow up. Most recently was seen at [facility] ED on 7/27/2020 for fall. Per patient, she was getting in to her son's car for CT scan of head (that was ordered by me) due to another fall at home several days prior. As she was getting in to car, she lost her balance and fell again. Patient was brought to ED For evaluation. While at ED, she had CT head and cervical neck completed--negative. She had basic labs drawn--negative. Was encouraged to follow up with me. Admits to frequently losing her balance and falling. Ambulates with a rollator walker which helps. Per patient, she has episodes of dizziness and transient vision loss if she gets up too quickly and starts to move. She has also been complaining of R ear fullness/nasal congestion. Denies syncope. Denies CP, sob. She is compliant with her medications. Review of Systems   Constitutional: Positive for activity change. Negative for appetite change, chills, diaphoresis, fatigue, fever and unexpected weight change. HENT: Negative for congestion. Respiratory: Negative for cough, chest tightness, shortness of breath and wheezing. Cardiovascular: Negative for chest pain, palpitations and leg swelling. Musculoskeletal: Positive for gait problem. Negative for joint swelling. Skin: Negative for color change and wound. Neurological: Positive for dizziness, weakness (decreased strengh in legs) and light-headedness. Negative for tremors, seizures, syncope, facial asymmetry, speech difficulty, numbness and headaches. Hematological: Bruises/bleeds easily.      Past Medical History:   Diagnosis Date    BPV (benign positional vertigo) 2019    CAD (coronary artery disease)     Dr William Dugan COPD (chronic obstructive pulmonary disease) ([facility] [address] 2012, VQ scan and CXR    has supplemental oxygen, uses it for 4-6 hours nightly, no inhalers use    Diverticulosis     Hiatal hernia     Hiatal hernia with gastroesophageal reflux 2015    EGD Dr Merle No, [facility]    History of blood transfusion     History of colonoscopy with polypectomy 2017    Dr Alea Guzman History of sleep apnea 2011    no CPAP use    Hypertension     Hypothyroidism     Nocturnal hypoxia 2011    nocturnal O2, 3 LNC, 4-6 hours nightly    Obesity (BMI 30-39. 9)     Osteoarthritis of multiple joints     Oxygen dependent 2019    Postnasal drip     Sigmoid diverticulosis     Weakness of both legs      Past Surgical History:   Procedure Laterality Date    HYSTERECTOMY      fibroid, bleeding    JOINT REPLACEMENT      DE COLON CA SCRN NOT HI RSK IND N/A 9/1/2017    COLONOSCOPY performed by Chantal Burkett MD at [address] [facility] ESOPHAGOGASTRODUODENOSCOPY TRANSORAL DIAGNOSTIC N/A 9/1/2017    EGD ESOPHAGOGASTRODUODENOSCOPY performed by Chantal Burkett MD at [address] [facility],  [address] Right 04/03/2012    TOTAL KNEE ARTHROPLASTY Bilateral     left x 2, right x1 (Dr Cam Posey)   [address]  12/29/15    Dr. Sandy Salas Marital status:       Spouse name: Not on file    Number of children: 9    Years of education: Not on file    Highest education level: Not on file   Occupational History    Occupation: homemaker and , retired   Social Needs    Financial resource strain: Not on file    Food insecurity     Worry: Not on file     Inability: Not on file   [employer] needs     Medical: Not on file     Non-medical: Not on file   Tobacco Use    Smoking status: Former Smoker    Smokeless tobacco: Never Used    Tobacco comment: quit 50 years ago   Substance and Sexual Activity    Alcohol use: No     Alcohol/week: 0.0 standard drinks    Drug use: No    Sexual activity: Never   Lifestyle    Physical activity     Days per week: Not on file     Minutes per session: Not on file    Stress: Not on file   Relationships    Social connections     Talks on phone: Not on file     Gets together: Not on file     Attends Jain service: Not on file     Active member of club or organization: Not on file     Attends meetings of clubs or organizations: Not on file     Relationship status: Not on file    Intimate partner violence     Fear of current or ex partner: Not on file     Emotionally abused: Not on file     Physically abused: Not on file     Forced sexual activity: Not on file   Other Topics Concern    Not on file   Social History Narrative    Born in [place] (grandparents came from [place], settled in [place])    ,  (X 2)    Lives in a house with two of her sons    7 children, 3 M, 1 F    Lost 63 yo son in Feb 2018 from lung cancer    Was  for the [employer], [employer], housework     Family History   Problem Relation Age of Onset    Diabetes Maternal Aunt     Osteoarthritis Father     Osteoarthritis Sister     Osteoarthritis Brother     Hypertension Mother     Heart Failure Mother         dec age 71, rheumatic     Allergies   Allergen Reactions    Ciprofloxacin Hcl     Lipitor [Atorvastatin]     Zetia [Ezetimibe]      Current Outpatient Medications   Medication Sig Dispense Refill    amLODIPine (NORVASC) 2.5 MG tablet TAKE 1 TABLET BY MOUTH DAILY (before lunch) 90 tablet 1    sertraline (ZOLOFT) 50 MG tablet Take 1 tablet by mouth every evening 90 tablet 3    rosuvastatin (CRESTOR) 10 MG tablet TAKE 1 TABLET BY MOUTH EVERY DAY 90 tablet 3    pantoprazole (PROTONIX) 40 MG tablet Take 1 tablet by mouth daily as needed (heartburn) 90 tablet 3    valsartan (DIOVAN) 160 MG tablet Take 2 tablets by mouth nightly 180 tablet 3    levothyroxine (SYNTHROID) 50 MCG tablet Take 1 tablet by mouth every morning (before breakfast) 90 tablet 3    fluticasone (FLONASE) 50 MCG/ACT nasal spray INHALE 2 sprays Nasally daily. 16 g 2    fexofenadine (ALLEGRA) 180 MG tablet Take 1 tablet by mouth daily 90 tablet 1    polyethylene glycol (GLYCOLAX) 17 GM/SCOOP powder Take 17 g by mouth daily as needed (constipation)      nitroGLYCERIN (NITROSTAT) 0.4 MG SL tablet Place 1 tablet under the tongue every 5 minutes as needed for Chest pain 25 tablet 6    diclofenac sodium (VOLTAREN) 1 % GEL Apply 4 g topically 4 times daily as needed for Pain (arthritis/joint pain) 2 Tube 2    aspirin 81 MG tablet Take 1 tablet by mouth daily 90 tablet 3    magnesium oxide (MAG-OX) 140 MG CAPS Take 140 mg by mouth daily      Misc. Devices MISC Portable Oxygen concentrator 1 Device 0    acetaminophen (TYLENOL) 500 MG tablet Take 500 mg by mouth every 6 hours as needed for Pain (1- 2 tabs in am) Indications: for pain      vitamin B-12 (CYANOCOBALAMIN) 1000 MCG tablet Take 1,000 mcg by mouth daily Indications: in am.      Cholecalciferol (VITAMIN D3) 2000 units CAPS Take 2,000 Units by mouth Daily with supper       Multiple Vitamins-Minerals (MULTIVITAMIN PO) Take 1 tablet by mouth daily       OXYGEN Inhale 3 L into the lungs continuous Uses prn at bedtime       No current facility-administered medications for this visit. PMH, Surgical Hx, Family Hx, and Social Hx reviewed and updated. Health Maintenance reviewed.     Objective  Vitals:    08/03/20 1334   BP: 118/80   Site: Left Upper Arm   Position: Sitting   Cuff Size: Medium Adult   Pulse: 84   Resp: 16   Temp: 97.1 °F (36.2 °C)   TempSrc: Temporal   SpO2: 98%   Weight: 200 lb 6.4 oz (90.9 kg)   Height: 5' 5\" (1.651 m)     BP Readings from Last 3 Encounters:   08/03/20 118/80   07/27/20 (!) 151/88   06/22/20 132/68     Wt Readings from Last 3 Encounters:   08/03/20 200 lb 6.4 oz (90.9 kg)   07/27/20 199 lb (90.3 kg) 06/22/20 199 lb (90.3 kg)     Physical Exam  Vitals signs reviewed. Constitutional:       General: She is not in acute distress. Appearance: Normal appearance. She is not ill-appearing, toxic-appearing or diaphoretic. HENT:      Head: Normocephalic and atraumatic. Right Ear: Hearing, ear canal and external ear normal. Tympanic membrane is bulging. Left Ear: Hearing, tympanic membrane, ear canal and external ear normal.      Nose: Nose normal.   Eyes:      Pupils: Pupils are equal, round, and reactive to light. Cardiovascular:      Rate and Rhythm: Normal rate and regular rhythm. Heart sounds: Murmur (3/6 systolic, best heard at RUSB) present. No gallop. Comments: EKG: sinus rhythm with 1st degree AV block, rate of 80 bpm. Noted QRS widening. Pulmonary:      Effort: Pulmonary effort is normal.      Breath sounds: Normal breath sounds. Skin:     General: Skin is warm. Findings: No bruising or erythema. Neurological:      General: No focal deficit present. Mental Status: She is alert and oriented to person, place, and time. Gait: Gait abnormal (ambulating with rollator). Psychiatric:         Mood and Affect: Mood normal.         Behavior: Behavior normal.         Thought Content: Thought content normal.         Judgment: Judgment normal.       Assessment & Plan   Nat Landon was seen today for fall. Diagnoses and all orders for this visit:    AV block, 1st degree  -     Echocardiogram complete; Future  -     Bartholomew Landau, MD, Cardiology, [place] [place]    Essential hypertension  -     amLODIPine (NORVASC) 2.5 MG tablet; TAKE 1 TABLET BY MOUTH DAILY (before lunch)  -     valsartan (DIOVAN) 160 MG tablet; Take 2 tablets by mouth nightly  -     EKG 12 lead; Future  -     EKG 12 lead  -     Bartholomew Landau, MD, Cardiology, [place]    Dizziness  -     EKG 12 lead;  Future  -     EKG 12 lead  -     Bartholomew Landau, MD, Cardiology, [place]    Abnormal EKG  -     Echocardiogram complete; Future  -     Bartholomew Landau, MD, Cardiology, [place]    At high risk for injury related to fall  -     [facility] [place]    Weakness of both legs  -     [facility] [place]    Aortic ejection murmur    Medication refill  -     amLODIPine (NORVASC) 2.5 MG tablet; TAKE 1 TABLET BY MOUTH DAILY (before lunch)  -     sertraline (ZOLOFT) 50 MG tablet; Take 1 tablet by mouth every evening  -     rosuvastatin (CRESTOR) 10 MG tablet; TAKE 1 TABLET BY MOUTH EVERY DAY  -     pantoprazole (PROTONIX) 40 MG tablet; Take 1 tablet by mouth daily as needed (heartburn)  -     valsartan (DIOVAN) 160 MG tablet; Take 2 tablets by mouth nightly  -     levothyroxine (SYNTHROID) 50 MCG tablet; Take 1 tablet by mouth every morning (before breakfast)    Sensation of fullness in right ear  -     fluticasone (FLONASE) 50 MCG/ACT nasal spray; INHALE 2 sprays Nasally daily. -     fexofenadine (ALLEGRA) 180 MG tablet; Take 1 tablet by mouth daily    Long talk with patient and daughter today. Further cardiac work-up recommended due to EKG and PE findings. Will refer to [facility] cardiology for further evaluation. Orthostatic BPs normal.   I would like 6 week follow up in the office to review labs, testing. Patient was hesitant to agree to PT. Daughter encouraged patient to accept for strength/balance. Call with any questions or concerns.      Orders Placed This Encounter   Procedures   [facility] - [place]     Referral Priority:   Routine     Referral Type:   Eval and Treat     Referral Reason:   Specialty Services Required     Requested Specialty:   Physical Therapy     Number of Visits Requested:   Cody Cee MD, Cardiology, [place]     Referral Priority:   Routine     Referral Type:   Eval and Treat     Referral Reason:   Specialty Services Required     Referred to Provider:   Blair Zamudio Kaleb Jaime MD     Requested Specialty:   Interventional Cardiology     Number of Visits Requested:   1    EKG 12 lead     Standing Status:   Future     Number of Occurrences:   1     Standing Expiration Date:   10/2/2020     Order Specific Question:   Reason for Exam?     Answer:   Irregular heart rate    Echocardiogram complete     Standing Status:   Future     Standing Expiration Date:   10/2/2020     Order Specific Question:   Reason for exam:     Answer:   dizziness, av block noted on ekg     Orders Placed This Encounter   Medications    amLODIPine (NORVASC) 2.5 MG tablet     Sig: TAKE 1 TABLET BY MOUTH DAILY (before lunch)     Dispense:  90 tablet     Refill:  1    sertraline (ZOLOFT) 50 MG tablet     Sig: Take 1 tablet by mouth every evening     Dispense:  90 tablet     Refill:  3    rosuvastatin (CRESTOR) 10 MG tablet     Sig: TAKE 1 TABLET BY MOUTH EVERY DAY     Dispense:  90 tablet     Refill:  3    pantoprazole (PROTONIX) 40 MG tablet     Sig: Take 1 tablet by mouth daily as needed (heartburn)     Dispense:  90 tablet     Refill:  3    valsartan (DIOVAN) 160 MG tablet     Sig: Take 2 tablets by mouth nightly     Dispense:  180 tablet     Refill:  3    levothyroxine (SYNTHROID) 50 MCG tablet     Sig: Take 1 tablet by mouth every morning (before breakfast)     Dispense:  90 tablet     Refill:  3    fluticasone (FLONASE) 50 MCG/ACT nasal spray     Sig: INHALE 2 sprays Nasally daily.      Dispense:  16 g     Refill:  2    fexofenadine (ALLEGRA) 180 MG tablet     Sig: Take 1 tablet by mouth daily     Dispense:  90 tablet     Refill:  1     Medications Discontinued During This Encounter   Medication Reason    amLODIPine (NORVASC) 2.5 MG tablet REORDER    sertraline (ZOLOFT) 50 MG tablet REORDER    rosuvastatin (CRESTOR) 10 MG tablet REORDER    pantoprazole (PROTONIX) 40 MG tablet REORDER    valsartan (DIOVAN) 160 MG tablet REORDER    levothyroxine (SYNTHROID) 50 MCG tablet REORDER    fluticasone (FLONASE) 50 MCG/ACT nasal spray REORDER    docusate sodium (COLACE) 100 MG capsule LIST CLEANUP     No follow-ups on file. Reviewed with the patient: current clinical status, medications, activities and diet. Side effects, adverse effects of the medication prescribed today, as well as treatment plan/ rationale and result expectations have been discussed with the patient who expresses understanding and desires to proceed. Close follow up to evaluate treatment results and for coordination of care. I have reviewed the patient's medical history in detail and updated the computerized patient record.     Tova Mazariegos PA-C

## 2020-08-04 ENCOUNTER — CARE COORDINATION (OUTPATIENT)
Dept: CARE COORDINATION | Age: 85
End: 2020-08-04

## 2020-08-04 NOTE — CARE COORDINATION
Telephone call to [facility] on Aging. Left voicemail of nature of call with request for return phone call. Call back number was provided.

## 2020-08-04 NOTE — CARE COORDINATION
Ambulatory Care Coordination Note  8/4/2020  CM Risk Score: 1  Charlson 10 Year Mortality Risk Score: 79%     ACC: Fermin Sanford RN    Summary Note: call to Walter Lamb to discuss health coaching. She has not had further falls and continues with use of assistive device. She has been referred to cardiology and will ask daughter to take her. She has also been referred to outpatient physical therapy due to decline in mobility. Will request c PT instead due to recent falls and need for walker. Message to pcp. She has been ordered new rx for allegra but has not obtained due to transportation difficulty. Message to msw . Discussed possibility of her daughter obtaining rx for her. She has not noted any other recent declines   COPD Assessment    Does the patient understand envrionmental exposure?:  No  Is the patient able to verbalize Rescue vs. Long Acting medications?:  No  Does the patient have a nebulizer?:  No  Does the patient use a space with inhaled medications?:  No     No patient-reported symptoms         Symptoms:      Have you had a recent diagnosis of pneumonia either by PCP or at a hospital?:  No             Care Coordination Interventions    Program Enrollment:  Rising Risk  Referral from Primary Care Provider:  Yes  Suggested Interventions and Community Resources  Pharmacist:  Completed  Physical Therapy: In Process  Social Work:  Completed  Transportation Support: In Process  Zone Management Tools: In Process         Goals Addressed                 This Visit's Progress     Conditions and Symptoms   Improving     I will notify my provider of any symptoms that indicate a worsening of my condition. Barriers: impairment:  physical: pain , financial, lack of support and stress  Plan for overcoming my barriers: care coordination, pharmacy and msw referral   Confidence: 7/10  Anticipated Goal Completion Date: 10/6/20              Prior to Admission medications    Medication Sig Start Date End Date Taking? Provider, MD   Multiple Vitamins-Minerals (MULTIVITAMIN PO) Take 1 tablet by mouth daily     Historical Provider, MD   OXYGEN Inhale 3 L into the lungs continuous Uses prn at bedtime    Historical Provider, MD       Future Appointments   Date Time Provider Mary Parekh   9/21/2020  9:45 AM Berto Ashby PA-C [address]

## 2020-08-04 NOTE — CARE COORDINATION
Telephone call with [facility] on Aging. She indicated at this time they are not doing transportation assistance to medical appointments.

## 2020-08-04 NOTE — CARE COORDINATION
Telephone call to patient. Provided her with contact information for [facility] on Aging/transportation assistance. Encouraged her to call to do intake for transportation assistance. Explained that this writer called [facility] on [facility] regarding transportation assistance availability and awaiting return phone call.

## 2020-08-05 ENCOUNTER — CARE COORDINATION (OUTPATIENT)
Dept: CARE COORDINATION | Age: 85
End: 2020-08-05

## 2020-08-05 PROBLEM — R94.31 ABNORMAL EKG: Status: ACTIVE | Noted: 2020-08-05

## 2020-08-05 PROBLEM — I35.1 AORTIC EJECTION MURMUR: Status: ACTIVE | Noted: 2020-08-05

## 2020-08-05 PROBLEM — R29.898 WEAKNESS OF BOTH LEGS: Status: ACTIVE | Noted: 2020-08-05

## 2020-08-05 PROBLEM — R42 DIZZINESS: Status: ACTIVE | Noted: 2020-08-05

## 2020-08-05 PROBLEM — I44.0 AV BLOCK, 1ST DEGREE: Status: ACTIVE | Noted: 2020-08-05

## 2020-08-05 PROBLEM — Z91.81 AT HIGH RISK FOR INJURY RELATED TO FALL: Status: ACTIVE | Noted: 2020-08-05

## 2020-08-05 ASSESSMENT — ENCOUNTER SYMPTOMS
CHEST TIGHTNESS: 0
SHORTNESS OF BREATH: 0
WHEEZING: 0
COUGH: 0
COLOR CHANGE: 0

## 2020-08-05 NOTE — CARE COORDINATION
Telephone call to patient. [facility] is not doing transportation to medical appointments at this time. Explained that [facility] on Aging emailed this writer list of transportation assistance in the Replaced by [facility] that are currently providing services. Explained that some of these transportation assistance you have to pay for. Patient was interested in this writer sending out the list that Jeff Ha provided. Discussed would be sending her out information in the mail.

## 2020-08-05 NOTE — LETTER
[address]       Dear Mary Ellen Comment,    I hope this letter finds you doing well! I am sending you resource information on transportation assistance. I hope you find the information helpful. Please look them over and let me know if you have any questions or need further assistance. You can contact me at any of the numbers listed below. Sincerely,    Randall Wooten, [address]  , [facility] [employer] [employer]  Cell Phone: [phone]  Email: [email or web address]

## 2020-08-09 NOTE — TELEPHONE ENCOUNTER
Pharmacy is  requesting medication refill. Please approve or deny this request.    Rx requested:  Requested Prescriptions     Pending Prescriptions Disp Refills    fluticasone (FLONASE) 50 MCG/ACT nasal spray [Pharmacy Med Name: fluticasone propionate 50 mcg/actuation nasal spray,suspension] 16 g 2     Sig: INHALE 2 sprays Nasally daily.          Last Office Visit:   8/3/2020      Next Visit Date:  Future Appointments   Date Time Provider Mary Parekh   9/21/2020  9:45 AM MARY Muir [facility]
Pt to teach back >3 nutrition education points

## 2020-08-10 RX ORDER — FLUTICASONE PROPIONATE 50 MCG
SPRAY, SUSPENSION (ML) NASAL
Qty: 16 G | Refills: 2 | Status: SHIPPED | OUTPATIENT
Start: 2020-08-10 | End: 2020-11-09

## 2020-08-11 ENCOUNTER — CARE COORDINATION (OUTPATIENT)
Dept: CARE COORDINATION | Age: 85
End: 2020-08-11

## 2020-08-11 NOTE — CARE COORDINATION
Ambulatory Care Coordination Note  8/11/2020  CM Risk Score: 1  Charlson 10 Year Mortality Risk Score: 79%     ACC: Climmie Kehr, RN    Summary Note: call to Kenton Rivera to discuss health coaching. She reports no recent decline. She has PT in the home and has not had further falls. She is still in need of transportation for cardiology appt. Message to msw. COPD Assessment    Does the patient understand envrionmental exposure?:  No  Is the patient able to verbalize Rescue vs. Long Acting medications?:  No  Does the patient have a nebulizer?:  No  Does the patient use a space with inhaled medications?:  No     No patient-reported symptoms         Symptoms:      Have you had a recent diagnosis of pneumonia either by PCP or at a hospital?:  No             Care Coordination Interventions    Program Enrollment:  Rising Risk  Referral from Primary Care Provider:  Yes  Suggested Interventions and [address]:  Completed  Pharmacist:  Completed  Physical Therapy:  Completed  Social Work:  Completed  Transportation Support: In Process  Zone Management Tools: In Process         Goals Addressed    None         Prior to Admission medications    Medication Sig Start Date End Date Taking? Authorizing Provider   fluticasone (FLONASE) 50 MCG/ACT nasal spray INHALE 2 sprays Nasally daily.  8/10/20   Marc Wilder MD   amLODIPine (NORVASC) 2.5 MG tablet TAKE 1 TABLET BY MOUTH DAILY (before lunch) 8/3/20   Tova Mazariegos PA-C   sertraline (ZOLOFT) 50 MG tablet Take 1 tablet by mouth every evening 8/3/20   Tova Mazariegos PA-C   rosuvastatin (CRESTOR) 10 MG tablet TAKE 1 TABLET BY MOUTH EVERY DAY 8/3/20   Tova Mazariegos PA-C   pantoprazole (PROTONIX) 40 MG tablet Take 1 tablet by mouth daily as needed (heartburn) 8/3/20   Tova Mazariegos PA-C   valsartan (DIOVAN) 160 MG tablet Take 2 tablets by mouth nightly 8/3/20   Tova Mazariegos PA-C   levothyroxine (SYNTHROID) 50 MCG tablet Take 1 tablet by mouth every morning (before breakfast) 8/3/20   Tova Mazariegos PA-C   fexofenadine (ALLEGRA) 180 MG tablet Take 1 tablet by mouth daily 8/3/20   Tova Mazariegos PA-C   polyethylene glycol (GLYCOLAX) 17 GM/SCOOP powder Take 17 g by mouth daily as needed (constipation)    Historical Provider, MD   nitroGLYCERIN (NITROSTAT) 0.4 MG SL tablet Place 1 tablet under the tongue every 5 minutes as needed for Chest pain 6/22/20   Tova Mazariegos PA-C   diclofenac sodium (VOLTAREN) 1 % GEL Apply 4 g topically 4 times daily as needed for Pain (arthritis/joint pain) 6/22/20   Tova Mazariegos PA-C   aspirin 81 MG tablet Take 1 tablet by mouth daily 2/11/20   Jordana Akins MD   magnesium oxide (MAG-OX) 140 MG CAPS Take 140 mg by mouth daily 1/22/07   Historical Provider, MD   Misc.  Devices MISC Portable Oxygen concentrator 5/24/19   Cinthia Ayala MD   acetaminophen (TYLENOL) 500 MG tablet Take 500 mg by mouth every 6 hours as needed for Pain (1- 2 tabs in am) Indications: for pain    Historical Provider, MD   vitamin B-12 (CYANOCOBALAMIN) 1000 MCG tablet Take 1,000 mcg by mouth daily Indications: in am.    Historical Provider, MD   Cholecalciferol (VITAMIN D3) 2000 units CAPS Take 2,000 Units by mouth Daily with supper     Historical Provider, MD   Multiple Vitamins-Minerals (MULTIVITAMIN PO) Take 1 tablet by mouth daily     Historical Provider, MD   OXYGEN Inhale 3 L into the lungs continuous Uses prn at bedtime    Historical Provider, MD       Future Appointments   Date Time Provider Mary Parekh   9/21/2020  9:45 AM Kacy Andrade PA-C [address]

## 2020-08-13 ENCOUNTER — TELEPHONE (OUTPATIENT)
Dept: FAMILY MEDICINE CLINIC | Age: 85
End: 2020-08-13

## 2020-08-13 NOTE — TELEPHONE ENCOUNTER
PT called to say that patient has had some elevated BP readings the past  Few days 140/100 and 145/107. She complained of a headache yesterday but nothing today. They just wanted to make you aware.    Next visit with you 9/21/2020

## 2020-08-25 ENCOUNTER — CARE COORDINATION (OUTPATIENT)
Dept: CARE COORDINATION | Age: 85
End: 2020-08-25

## 2020-08-25 NOTE — CARE COORDINATION
Ambulatory Care Coordination Note  8/25/2020  CM Risk Score: 1  Charlson 10 Year Mortality Risk Score: 79%     ACC: Blas Gómez RN    Summary Note: call to merary to continue health coaching. She reports she has been feeling well with no noted declines. Her bp today 115/77. She has dr kitchen scheduled and has arranged transportation through [facility]. Discussed ACP. Confirmed decision maker and discussed completion of ACP forms. She states she thinks she has done but is unsure. She declined referral for assistance at this time. COPD Assessment    Does the patient understand envrionmental exposure?:  No  Is the patient able to verbalize Rescue vs. Long Acting medications?:  No  Does the patient have a nebulizer?:  No  Does the patient use a space with inhaled medications?:  No     No patient-reported symptoms         Symptoms:      Have you had a recent diagnosis of pneumonia either by PCP or at a hospital?:  No             Care Coordination Interventions    Program Enrollment:  Rising Risk  Referral from Primary Care Provider:  Yes  Suggested Interventions and [address]:  Completed  Pharmacist:  Completed  Physical Therapy:  Completed  Social Work:  Completed  Transportation Support:  Completed  Zone Management Tools: In Process         Goals Addressed                 This Visit's Progress     Conditions and Symptoms   On track     I will notify my provider of any symptoms that indicate a worsening of my condition. Barriers: impairment:  physical: pain , financial, lack of support and stress  Plan for overcoming my barriers: care coordination, pharmacy and msw referral   Confidence: 7/10  Anticipated Goal Completion Date: 10/6/20              Prior to Admission medications    Medication Sig Start Date End Date Taking? Authorizing Provider   fluticasone (FLONASE) 50 MCG/ACT nasal spray INHALE 2 sprays Nasally daily.  8/10/20   Kelli Dempsey MD   amLODIPine (NORVASC) 2.5 MG tablet TAKE 1 TABLET BY MOUTH DAILY (before lunch) 8/3/20   Tova Mazariegos PA-C   sertraline (ZOLOFT) 50 MG tablet Take 1 tablet by mouth every evening 8/3/20   Tova Mazariegos PA-C   rosuvastatin (CRESTOR) 10 MG tablet TAKE 1 TABLET BY MOUTH EVERY DAY 8/3/20   Tova Mazariegos PA-C   pantoprazole (PROTONIX) 40 MG tablet Take 1 tablet by mouth daily as needed (heartburn) 8/3/20   Tova Mazariegos PA-C   valsartan (DIOVAN) 160 MG tablet Take 2 tablets by mouth nightly 8/3/20   Tova Mazariegos PA-C   levothyroxine (SYNTHROID) 50 MCG tablet Take 1 tablet by mouth every morning (before breakfast) 8/3/20   Tova Mazariegos PA-C   fexofenadine (ALLEGRA) 180 MG tablet Take 1 tablet by mouth daily 8/3/20   Tova Mazariegos PA-C   polyethylene glycol (GLYCOLAX) 17 GM/SCOOP powder Take 17 g by mouth daily as needed (constipation)    Historical Provider, MD   nitroGLYCERIN (NITROSTAT) 0.4 MG SL tablet Place 1 tablet under the tongue every 5 minutes as needed for Chest pain 6/22/20   Tova Mazariegos PA-C   diclofenac sodium (VOLTAREN) 1 % GEL Apply 4 g topically 4 times daily as needed for Pain (arthritis/joint pain) 6/22/20   Tova Mazariegos PA-C   aspirin 81 MG tablet Take 1 tablet by mouth daily 2/11/20   Onesimo Thomas MD   magnesium oxide (MAG-OX) 140 MG CAPS Take 140 mg by mouth daily 1/22/07   Historical Provider, MD   Misc.  Devices MISC Portable Oxygen concentrator 5/24/19   Isra Mendez MD   acetaminophen (TYLENOL) 500 MG tablet Take 500 mg by mouth every 6 hours as needed for Pain (1- 2 tabs in am) Indications: for pain    Historical Provider, MD   vitamin B-12 (CYANOCOBALAMIN) 1000 MCG tablet Take 1,000 mcg by mouth daily Indications: in am.    Historical Provider, MD   Cholecalciferol (VITAMIN D3) 2000 units CAPS Take 2,000 Units by mouth Daily with supper     Historical Provider, MD   Multiple Vitamins-Minerals (MULTIVITAMIN PO) Take 1 tablet by mouth daily     Historical Provider, MD   OXYGEN Inhale 3 L into the lungs continuous Uses prn

## 2020-09-01 ENCOUNTER — TELEPHONE (OUTPATIENT)
Dept: FAMILY MEDICINE CLINIC | Age: 85
End: 2020-09-01

## 2020-09-02 ENCOUNTER — OFFICE VISIT (OUTPATIENT)
Dept: CARDIOLOGY CLINIC | Age: 85
End: 2020-09-02
Payer: MEDICARE

## 2020-09-02 VITALS
HEIGHT: 65 IN | SYSTOLIC BLOOD PRESSURE: 125 MMHG | BODY MASS INDEX: 32.82 KG/M2 | DIASTOLIC BLOOD PRESSURE: 83 MMHG | WEIGHT: 197 LBS | HEART RATE: 84 BPM | RESPIRATION RATE: 18 BRPM | OXYGEN SATURATION: 98 %

## 2020-09-02 PROCEDURE — G8427 DOCREV CUR MEDS BY ELIG CLIN: HCPCS | Performed by: INTERNAL MEDICINE

## 2020-09-02 PROCEDURE — 1090F PRES/ABSN URINE INCON ASSESS: CPT | Performed by: INTERNAL MEDICINE

## 2020-09-02 PROCEDURE — G8417 CALC BMI ABV UP PARAM F/U: HCPCS | Performed by: INTERNAL MEDICINE

## 2020-09-02 PROCEDURE — 99204 OFFICE O/P NEW MOD 45 MIN: CPT | Performed by: INTERNAL MEDICINE

## 2020-09-02 NOTE — PROGRESS NOTES
[facility] OFFICE CONSULT      Patient: Eneida Zee  YOB: 1933  MRN: [id number]    Chief Complaint:  Chief Complaint   Patient presents with   Jase Filter Establish Cardiologist     referral Brisa Munoz    Dizziness    Abnormal Test Results     abnormal EKG    Shortness of Breath     with exertion    Swelling     BLE       Subjective/HPI    The patient presents for evaluation as a referral from Rocky Bennett. The patient has been having the following problems: HTN, abnormal EKG    Cardiac diagnoses prior to this visit: CAD, HTN, HPL    Testing performed prior to this visit: echo pending    The patient reports falls. No chest pain or shortness of breath. Additional history includes none. EKG: NSR    Past Medical History:   Diagnosis Date    BPV (benign positional vertigo) 2019    CAD (coronary artery disease)     Dr Osvaldo Kinney COPD (chronic obstructive pulmonary disease) ([facility] [address] 2012, VQ scan and CXR    has supplemental oxygen, uses it for 4-6 hours nightly, no inhalers use    Diverticulosis     Hiatal hernia     Hiatal hernia with gastroesophageal reflux 2015    EGD Dr Allen Galeas, [facility]    History of blood transfusion     History of colonoscopy with polypectomy 2017    Dr Etienne Das History of sleep apnea 2011    no CPAP use    Hypertension     Hypothyroidism     Nocturnal hypoxia 2011    nocturnal O2, 3 LNC, 4-6 hours nightly    Obesity (BMI 30-39. 9)     Osteoarthritis of multiple joints     Oxygen dependent 2019    Postnasal drip     Sigmoid diverticulosis     Weakness of both legs        Past Surgical History:   Procedure Laterality Date    HYSTERECTOMY      fibroid, bleeding    JOINT REPLACEMENT      GA COLON CA SCRN NOT HI RSK IND N/A 9/1/2017    COLONOSCOPY performed by Kaz Giang MD at [address] [facility] ESOPHAGOGASTRODUODENOSCOPY TRANSORAL DIAGNOSTIC N/A 9/1/2017    EGD ESOPHAGOGASTRODUODENOSCOPY performed by Kaz Giang MD at [address] [facility] TONSILLECTOMY      TOTAL HIP ARTHROPLASTY Right 04/03/2012    TOTAL KNEE ARTHROPLASTY Bilateral     left x 2, right x1 (Dr Alejandro Torres)   [facility] ENDOSCOPY  12/29/15    Dr. Yaron Middleton       Family History   Problem Relation Age of Onset    Diabetes Maternal Aunt     Osteoarthritis Father     Osteoarthritis Sister     Osteoarthritis Brother     Hypertension Mother     Heart Failure Mother         dec age 71, rheumatic       Social History     Socioeconomic History    Marital status:       Spouse name: None    Number of children: 9    Years of education: None    Highest education level: None   Occupational History    Occupation: homemaker and , retired   Social Needs    Financial resource strain: None    Food insecurity     Worry: None     Inability: None    Transportation needs     Medical: None     Non-medical: None   Tobacco Use    Smoking status: Former Smoker    Smokeless tobacco: Never Used    Tobacco comment: quit 50 years ago   Substance and Sexual Activity    Alcohol use: No     Alcohol/week: 0.0 standard drinks    Drug use: No    Sexual activity: Never   Lifestyle    Physical activity     Days per week: None     Minutes per session: None    Stress: None   Relationships    Social connections     Talks on phone: None     Gets together: None     Attends Caodaism service: None     Active member of club or organization: None     Attends meetings of clubs or organizations: None     Relationship status: None    Intimate partner violence     Fear of current or ex partner: None     Emotionally abused: None     Physically abused: None     Forced sexual activity: None   Other Topics Concern    None   Social History Narrative    Born in [place] (grandparents came from [place], settled in [place])    ,  (X 2)    Lives in a house with two of her sons    7 children, 3 M, 1 F    Lost 63 yo son in Feb 2018 from lung cancer    Was  for the [email or web address] Hobbies netting, crosswords, cooking, housework       Allergies   Allergen Reactions    Ciprofloxacin Hcl     Lipitor [Atorvastatin]     Zetia [Ezetimibe]          Review of Systems:   Review of Systems      Physical Examination:    /83 (Site: Left Upper Arm, Position: Sitting, Cuff Size: Large Adult)   Pulse 84   Resp 18   Ht 5' 5\" (1.651 m)   Wt 197 lb (89.4 kg)   SpO2 98%   BMI 32.78 kg/m²    Physical Exam   Constitutional: She appears healthy. No distress. HENT:   Mouth/Throat: Oropharynx is clear. Eyes: Pupils are equal, round, and reactive to light. Neck: Normal range of motion. No JVD present. Cardiovascular: Regular rhythm, S1 normal, S2 normal, normal heart sounds and intact distal pulses. Exam reveals no gallop. No murmur heard. Pulses:       Radial pulses are 2+ on the right side. Dorsalis pedis pulses are 2+ on the right side. Pulmonary/Chest: Effort normal and breath sounds normal. She has no wheezes. She has no rales. She exhibits no tenderness. Abdominal: Soft. Bowel sounds are normal.   Musculoskeletal: Normal range of motion. General: No edema. Neurological: She is alert and oriented to person, place, and time. She has intact cranial nerves. Skin: Skin is warm and dry. No rash noted.        LABS:  CBC:   Lab Results   Component Value Date    WBC 6.4 07/27/2020    RBC 4.87 07/27/2020    RBC 4.37 05/08/2012    HGB 14.8 07/27/2020    HCT 44.3 07/27/2020    MCV 91.0 07/27/2020    MCH 30.3 07/27/2020    MCHC 33.3 07/27/2020    RDW 14.5 07/27/2020     07/27/2020    MPV 8.5 10/09/2014     Lipids:  Lab Results   Component Value Date    CHOL 141 09/20/2019    CHOL 157 05/03/2017    CHOL 144 01/19/2016     Lab Results   Component Value Date    TRIG 103 09/20/2019    TRIG 112 05/03/2017    TRIG 112 01/19/2016     Lab Results   Component Value Date    HDL 47 09/20/2019    HDL 48 05/03/2017    HDL 52 01/19/2016     Lab Results   Component Value Date LDLCALC 73 09/20/2019    LDLCALC 87 05/03/2017    LDLCALC 70 01/19/2016     No results found for: LABVLDL, VLDL  No results found for: CHOLHDLRATIO  CMP:    Lab Results   Component Value Date     07/27/2020    K 3.9 07/27/2020     07/27/2020    CO2 25 07/27/2020    BUN 24 07/27/2020    CREATININE 0.77 07/27/2020    GFRAA >60.0 07/27/2020    LABGLOM >60.0 07/27/2020    GLUCOSE 100 07/27/2020    GLUCOSE 91 04/15/2012    PROT 7.6 09/20/2019    LABALBU 4.4 09/20/2019    LABALBU 3.0 04/15/2012    CALCIUM 9.3 07/27/2020    BILITOT 0.5 09/20/2019    ALKPHOS 75 09/20/2019    AST 15 09/20/2019    ALT 12 09/20/2019     BMP:    Lab Results   Component Value Date     07/27/2020    K 3.9 07/27/2020     07/27/2020    CO2 25 07/27/2020    BUN 24 07/27/2020    LABALBU 4.4 09/20/2019    LABALBU 3.0 04/15/2012    CREATININE 0.77 07/27/2020    CALCIUM 9.3 07/27/2020    GFRAA >60.0 07/27/2020    LABGLOM >60.0 07/27/2020    GLUCOSE 100 07/27/2020    GLUCOSE 91 04/15/2012     Magnesium:    Lab Results   Component Value Date    MG 2.0 10/09/2014     TSH:  Lab Results   Component Value Date    TSH 2.230 09/20/2019       Patient Active Problem List   Diagnosis    GERD (gastroesophageal reflux disease)    Diverticulosis    Postnasal drip    [facility] ([facility]    Obesity (BMI 30-39. 9)    Osteoarthritis of multiple joints    History of sleep apnea    Essential hypertension    Vitamin D deficiency    At high risk for falls    Benign paroxysmal positional vertigo    Coronary artery disease involving native coronary artery of native heart without angina pectoris    AV block, 1st degree    Dizziness    Abnormal EKG    At high risk for injury related to fall    Weakness of both legs    Aortic ejection murmur       Current Outpatient Medications   Medication Sig Dispense Refill    fluticasone (FLONASE) 50 MCG/ACT nasal spray INHALE 2 sprays Nasally daily.  16 g 2    amLODIPine (NORVASC) 2.5 MG tablet TAKE 1 TABLET BY MOUTH DAILY (before lunch) 90 tablet 1    sertraline (ZOLOFT) 50 MG tablet Take 1 tablet by mouth every evening 90 tablet 3    rosuvastatin (CRESTOR) 10 MG tablet TAKE 1 TABLET BY MOUTH EVERY DAY 90 tablet 3    pantoprazole (PROTONIX) 40 MG tablet Take 1 tablet by mouth daily as needed (heartburn) 90 tablet 3    valsartan (DIOVAN) 160 MG tablet Take 2 tablets by mouth nightly 180 tablet 3    levothyroxine (SYNTHROID) 50 MCG tablet Take 1 tablet by mouth every morning (before breakfast) 90 tablet 3    polyethylene glycol (GLYCOLAX) 17 GM/SCOOP powder Take 17 g by mouth daily as needed (constipation)      nitroGLYCERIN (NITROSTAT) 0.4 MG SL tablet Place 1 tablet under the tongue every 5 minutes as needed for Chest pain 25 tablet 6    diclofenac sodium (VOLTAREN) 1 % GEL Apply 4 g topically 4 times daily as needed for Pain (arthritis/joint pain) 2 Tube 2    aspirin 81 MG tablet Take 1 tablet by mouth daily 90 tablet 3    magnesium oxide (MAG-OX) 140 MG CAPS Take 140 mg by mouth daily      Misc. Devices MISC Portable Oxygen concentrator 1 Device 0    acetaminophen (TYLENOL) 500 MG tablet Take 500 mg by mouth every 6 hours as needed for Pain (1- 2 tabs in am) Indications: for pain      vitamin B-12 (CYANOCOBALAMIN) 1000 MCG tablet Take 1,000 mcg by mouth daily Indications: in am.      Cholecalciferol (VITAMIN D3) 2000 units CAPS Take 2,000 Units by mouth Daily with supper       Multiple Vitamins-Minerals (MULTIVITAMIN PO) Take 1 tablet by mouth daily       OXYGEN Inhale 3 L into the lungs continuous Uses prn at bedtime       No current facility-administered medications for this visit. Assessment/Plan:    1. Dizziness    - Holter Monitor 48 Hour; Future    2. AV block, 1st degree    - Holter Monitor 48 Hour; Future    3. Abnormal EKG      4. Essential hypertension      5.  Coronary artery disease involving native coronary artery of native heart without angina pectoris         Counseling:       Return in about 4 weeks (around 9/30/2020) for followup cv disease.       Electronically signed by Faustino Wise MD on 9/27/2020 at 1:49 PM

## 2020-09-09 ENCOUNTER — CARE COORDINATION (OUTPATIENT)
Dept: CARE COORDINATION | Age: 85
End: 2020-09-09

## 2020-09-21 ENCOUNTER — OFFICE VISIT (OUTPATIENT)
Dept: FAMILY MEDICINE CLINIC | Age: 85
End: 2020-09-21
Payer: MEDICARE

## 2020-09-21 ENCOUNTER — HOSPITAL ENCOUNTER (OUTPATIENT)
Dept: NON INVASIVE DIAGNOSTICS | Age: 85
Discharge: HOME | End: 2020-09-21
Payer: MEDICARE

## 2020-09-21 ENCOUNTER — CARE COORDINATION (OUTPATIENT)
Dept: CARE COORDINATION | Age: 85
End: 2020-09-21

## 2020-09-21 VITALS
WEIGHT: 200.2 LBS | BODY MASS INDEX: 33.36 KG/M2 | DIASTOLIC BLOOD PRESSURE: 80 MMHG | SYSTOLIC BLOOD PRESSURE: 130 MMHG | TEMPERATURE: 96.5 F | RESPIRATION RATE: 16 BRPM | HEART RATE: 54 BPM | OXYGEN SATURATION: 96 % | HEIGHT: 65 IN

## 2020-09-21 LAB
LV EF: 60 %
LVEF MODALITY: NORMAL

## 2020-09-21 PROCEDURE — 4040F PNEUMOC VAC/ADMIN/RCVD: CPT | Performed by: PHYSICIAN ASSISTANT

## 2020-09-21 PROCEDURE — 93306 TTE W/DOPPLER COMPLETE: CPT

## 2020-09-21 PROCEDURE — G8427 DOCREV CUR MEDS BY ELIG CLIN: HCPCS | Performed by: PHYSICIAN ASSISTANT

## 2020-09-21 PROCEDURE — 99214 OFFICE O/P EST MOD 30 MIN: CPT | Performed by: PHYSICIAN ASSISTANT

## 2020-09-21 PROCEDURE — G8417 CALC BMI ABV UP PARAM F/U: HCPCS | Performed by: PHYSICIAN ASSISTANT

## 2020-09-21 PROCEDURE — 1090F PRES/ABSN URINE INCON ASSESS: CPT | Performed by: PHYSICIAN ASSISTANT

## 2020-09-21 PROCEDURE — 1123F ACP DISCUSS/DSCN MKR DOCD: CPT | Performed by: PHYSICIAN ASSISTANT

## 2020-09-21 PROCEDURE — 90694 VACC AIIV4 NO PRSRV 0.5ML IM: CPT | Performed by: PHYSICIAN ASSISTANT

## 2020-09-21 PROCEDURE — 1036F TOBACCO NON-USER: CPT | Performed by: PHYSICIAN ASSISTANT

## 2020-09-21 ASSESSMENT — ENCOUNTER SYMPTOMS
WHEEZING: 0
CHEST TIGHTNESS: 0
SHORTNESS OF BREATH: 0
COLOR CHANGE: 0

## 2020-09-21 ASSESSMENT — PATIENT HEALTH QUESTIONNAIRE - PHQ9
SUM OF ALL RESPONSES TO PHQ QUESTIONS 1-9: 0
2. FEELING DOWN, DEPRESSED OR HOPELESS: 0
1. LITTLE INTEREST OR PLEASURE IN DOING THINGS: 0
SUM OF ALL RESPONSES TO PHQ9 QUESTIONS 1 & 2: 0
SUM OF ALL RESPONSES TO PHQ QUESTIONS 1-9: 0

## 2020-09-21 NOTE — PROGRESS NOTES
Subjective  Deya Chandler, 80 y.o. female presents today with:  Chief Complaint   Patient presents with    Dizziness     1 month follow up     HPI  Norm Klinefelter is in the office today for 1 month follow up. Last OV with me: 8/3/2020  No acute questions or concerns today. Would like flu vaccine today. Dizziness. Last month, patient was having recurrent episodes of dizziness with falls at home. Had work-up last month including EKG in office, lab work. She saw Dr. Meg Armas 9/2 for 1st degree AV block. He ordered 48 hr holter monitor. She has ECHO today. Has had very little episodes of dizziness since last OV. Continuing to use flonase daily. PRN allegra when ears feel full--which has helped. She has follow up with me on 10/7/2020. Leg weakness. Home PT ordered for patient at last OV. Had several sessions and did very well with exercises. Balance has improved. No further falls. Review of Systems   Constitutional: Negative for activity change, appetite change, chills, diaphoresis, fatigue and fever. Respiratory: Negative for chest tightness, shortness of breath and wheezing. Cardiovascular: Negative for chest pain, palpitations and leg swelling. Musculoskeletal: Positive for arthralgias and gait problem (ambulates with walker). Skin: Negative for color change. Neurological: Negative for dizziness, seizures, weakness, light-headedness, numbness and headaches.      Past Medical History:   Diagnosis Date    BPV (benign positional vertigo) 2019    CAD (coronary artery disease)     Dr Aaron Vick COPD (chronic obstructive pulmonary disease) ([facility] [address] 2012, VQ scan and CXR    has supplemental oxygen, uses it for 4-6 hours nightly, no inhalers use    Diverticulosis     Hiatal hernia     Hiatal hernia with gastroesophageal reflux 2015    EGD Dr Ryann Vargas, [facility]    History of blood transfusion     History of colonoscopy with polypectomy 2017    Dr Nixon Madrigal History of sleep apnea 2011    no CPAP use    Hypertension     Hypothyroidism     Nocturnal hypoxia 2011    nocturnal O2, 3 LNC, 4-6 hours nightly    Obesity (BMI 30-39. 9)     Osteoarthritis of multiple joints     Oxygen dependent 2019    Postnasal drip     Sigmoid diverticulosis     Weakness of both legs      Past Surgical History:   Procedure Laterality Date    HYSTERECTOMY      fibroid, bleeding    JOINT REPLACEMENT      AL COLON CA SCRN NOT HI RSK IND N/A 9/1/2017    COLONOSCOPY performed by Kenzie Westbrook MD at [address] [facility] ESOPHAGOGASTRODUODENOSCOPY TRANSORAL DIAGNOSTIC N/A 9/1/2017    EGD ESOPHAGOGASTRODUODENOSCOPY performed by Kenzie Westbrook MD at [address] [facility] [address] Right 04/03/2012    TOTAL KNEE ARTHROPLASTY Bilateral     left x 2, right x1 (Dr Asif Joe)   [address] [facility] [address]  12/29/15    Dr. Magalie Alcala Marital status:       Spouse name: Not on file    Number of children: 9    Years of education: Not on file    Highest education level: Not on file   Occupational History    Occupation: homemaker and , retired   Social Needs    Financial resource strain: Not on file    Food insecurity     Worry: Not on file     Inability: Not on file   [employer] needs     Medical: Not on file     Non-medical: Not on file   Tobacco Use    Smoking status: Former Smoker    Smokeless tobacco: Never Used    Tobacco comment: quit 50 years ago   Substance and Sexual Activity    Alcohol use: No     Alcohol/week: 0.0 standard drinks    Drug use: No    Sexual activity: Never   Lifestyle    Physical activity     Days per week: Not on file     Minutes per session: Not on file    Stress: Not on file   Relationships    Social connections     Talks on phone: Not on file     Gets together: Not on file     Attends Rastafari service: Not on file     Active member of club or organization: Not on file     Attends diclofenac sodium (VOLTAREN) 1 % GEL Apply 4 g topically 4 times daily as needed for Pain (arthritis/joint pain) 2 Tube 2    aspirin 81 MG tablet Take 1 tablet by mouth daily 90 tablet 3    Misc. Devices MISC Portable Oxygen concentrator 1 Device 0    acetaminophen (TYLENOL) 500 MG tablet Take 500 mg by mouth every 6 hours as needed for Pain (1- 2 tabs in am) Indications: for pain      vitamin B-12 (CYANOCOBALAMIN) 1000 MCG tablet Take 1,000 mcg by mouth daily Indications: in am.      Cholecalciferol (VITAMIN D3) 2000 units CAPS Take 2,000 Units by mouth Daily with supper       Multiple Vitamins-Minerals (MULTIVITAMIN PO) Take 1 tablet by mouth daily       OXYGEN Inhale 3 L into the lungs continuous Uses prn at bedtime      magnesium oxide (MAG-OX) 140 MG CAPS Take 140 mg by mouth daily       No current facility-administered medications for this visit. PMH, Surgical Hx, Family Hx, and Social Hx reviewed and updated. Health Maintenance reviewed. Objective  Vitals:    09/21/20 0944   BP: 130/80   Site: Right Upper Arm   Position: Sitting   Cuff Size: Large Adult   Pulse: 54   Resp: 16   Temp: 96.5 °F (35.8 °C)   TempSrc: Tympanic   SpO2: 96%   Weight: 200 lb 3.2 oz (90.8 kg)   Height: 5' 5\" (1.651 m)     BP Readings from Last 3 Encounters:   09/21/20 130/80   09/02/20 125/83   08/03/20 118/80     Wt Readings from Last 3 Encounters:   09/21/20 200 lb 3.2 oz (90.8 kg)   09/02/20 197 lb (89.4 kg)   08/03/20 200 lb 6.4 oz (90.9 kg)     Physical Exam  Vitals signs reviewed. Constitutional:       General: She is not in acute distress. Appearance: Normal appearance. She is not ill-appearing. HENT:      Head: Normocephalic and atraumatic. Right Ear: External ear normal.      Left Ear: External ear normal.      Nose: Nose normal.   Cardiovascular:      Rate and Rhythm: Normal rate and regular rhythm. Pulmonary:      Effort: Pulmonary effort is normal.      Breath sounds: Normal breath sounds. Musculoskeletal:         General: No tenderness. Right lower leg: Edema (pedal) present. Left lower leg: Edema (pedal) present. Skin:     General: Skin is warm. Neurological:      General: No focal deficit present. Mental Status: She is alert and oriented to person, place, and time. Psychiatric:         Mood and Affect: Mood normal.         Behavior: Behavior normal.         Thought Content: Thought content normal.         Judgment: Judgment normal.      Comments: Reports feeling significantly better today. Assessment & Plan   Migel Elizalde was seen today for dizziness. Diagnoses and all orders for this visit:    AV block, 1st degree  Comments:  Has echocardiogram today. Coronary artery disease involving native coronary artery of native heart without angina pectoris    Essential hypertension  Comments:  Stable. Weakness of both legs  Comments:  improved with home PT. Benign paroxysmal positional vertigo, unspecified laterality  Comments:  Flonase daily. Allegra PRN. Exercises and modification per home PT. Dizziness  Comments:  resolved. Continue to monitor. Use flonase/PRN allegra. Flu vaccine need  -     INFLUENZA, QUADV, ADJUVANTED, 65 YRS =, IM, PF, PREFILL SYR, 0.5ML (FLUAD)      Orders Placed This Encounter   Procedures    INFLUENZA, QUADV, ADJUVANTED, 65 YRS =, IM, PF, PREFILL SYR, 0.5ML (FLUAD)     No orders of the defined types were placed in this encounter. Medications Discontinued During This Encounter   Medication Reason    fexofenadine (ALLEGRA) 180 MG tablet LIST CLEANUP     Return in about 3 months (around 12/18/2020) for follow up in office . Reviewed with the patient: current clinical status, medications, activities and diet.      Side effects, adverse effects of the medication prescribed today, as well as treatment plan/ rationale and result expectations have been discussed with the patient who expresses understanding and desires to proceed. Close follow up to evaluate treatment results and for coordination of care. I have reviewed the patient's medical history in detail and updated the computerized patient record.     Tova Mazariegos PA-C

## 2020-09-23 ENCOUNTER — CARE COORDINATION (OUTPATIENT)
Dept: CARE COORDINATION | Age: 85
End: 2020-09-23

## 2020-09-29 ENCOUNTER — CARE COORDINATION (OUTPATIENT)
Dept: CARE COORDINATION | Age: 85
End: 2020-09-29

## 2020-10-02 ENCOUNTER — HOSPITAL ENCOUNTER (OUTPATIENT)
Dept: NON INVASIVE DIAGNOSTICS | Age: 85
Discharge: HOME | End: 2020-10-02
Payer: MEDICARE

## 2020-10-02 PROCEDURE — 93225 XTRNL ECG REC<48 HRS REC: CPT

## 2020-10-02 PROCEDURE — 93226 XTRNL ECG REC<48 HR SCAN A/R: CPT

## 2020-10-05 ENCOUNTER — CARE COORDINATION (OUTPATIENT)
Dept: CARE COORDINATION | Age: 85
End: 2020-10-05

## 2020-10-05 NOTE — CARE COORDINATION
Telephone call to patient. Left voicemail of nature of call with request for return phone call. Call back number was provide. Unsuccessful with attempts to reach patient. Will discharge from [address] [place] [address]

## 2020-10-06 ENCOUNTER — CARE COORDINATION (OUTPATIENT)
Dept: CARE COORDINATION | Age: 85
End: 2020-10-06

## 2020-10-07 PROCEDURE — 93227 XTRNL ECG REC<48 HR R&I: CPT | Performed by: INTERNAL MEDICINE

## 2020-10-07 NOTE — CARE COORDINATION
Call to merary to discuss health coaching. Have not been able to maintain contact. Left message that will d/c from care coordination. Requested Danny Salas return call if she requires assistance or questions.

## 2020-10-07 NOTE — PROCEDURES
[address]                      [address], [address] [facility]                                 HOLTER MONITOR    PATIENT NAME: Karmen Robledo                  :        1933  MED REC NO:   [id number]                            ROOM:  ACCOUNT NO:   [de-identified]                           ADMIT DATE: 10/02/2020  PROVIDER:     Ewelina Pemberton MD    HOLTER MONITOR 48-HOURS    DATE OF STUDY:  10/02/2020    REFERRING PROVIDERS:  Dr. Jovita Brewer and [facility]. COMMENTS:  The patient was monitored for 48 hours. The underlying  rhythm is sinus rhythm with bundle branch block. Maximum heart rate  achieved was 125 beats per minute. Minimum was 75 beats per minute. There were 346 PVCs noted which were isolated. There were 21,061 PACs  noted. There was one 13-beat long run of nonsustained SVT at 148 beats  per minute. Longest pause was 2.2 seconds.         Garima De Guzman MD    D: 10/07/2020 16:06:24       T: 10/07/2020 17:12:37     [email or web address]  Job#: [id number]     Doc#: [id number]    CC:

## 2020-10-12 ENCOUNTER — OFFICE VISIT (OUTPATIENT)
Dept: CARDIOLOGY CLINIC | Age: 85
End: 2020-10-12
Payer: MEDICARE

## 2020-10-12 VITALS
DIASTOLIC BLOOD PRESSURE: 80 MMHG | BODY MASS INDEX: 33.12 KG/M2 | WEIGHT: 199 LBS | SYSTOLIC BLOOD PRESSURE: 120 MMHG | OXYGEN SATURATION: 95 % | HEART RATE: 87 BPM

## 2020-10-12 PROCEDURE — 1090F PRES/ABSN URINE INCON ASSESS: CPT | Performed by: INTERNAL MEDICINE

## 2020-10-12 PROCEDURE — G8427 DOCREV CUR MEDS BY ELIG CLIN: HCPCS | Performed by: INTERNAL MEDICINE

## 2020-10-12 PROCEDURE — G8484 FLU IMMUNIZE NO ADMIN: HCPCS | Performed by: INTERNAL MEDICINE

## 2020-10-12 PROCEDURE — 4040F PNEUMOC VAC/ADMIN/RCVD: CPT | Performed by: INTERNAL MEDICINE

## 2020-10-12 PROCEDURE — G8417 CALC BMI ABV UP PARAM F/U: HCPCS | Performed by: INTERNAL MEDICINE

## 2020-10-12 PROCEDURE — 1036F TOBACCO NON-USER: CPT | Performed by: INTERNAL MEDICINE

## 2020-10-12 PROCEDURE — 99213 OFFICE O/P EST LOW 20 MIN: CPT | Performed by: INTERNAL MEDICINE

## 2020-10-12 PROCEDURE — 1123F ACP DISCUSS/DSCN MKR DOCD: CPT | Performed by: INTERNAL MEDICINE

## 2020-10-12 NOTE — PROGRESS NOTES
[facility] CARDIOLOGY OFFICE FOLLOW-UP      Patient: Shera Siemens  YOB: 1933  MRN: [id number]    Chief Complaint:  Chief Complaint   Patient presents with    1 Month Follow-Up    Results     HOLTER MONITOR       Subjective/HPI    The patient is followed in the cardiology office chronically for the following problems: abnormal EKG    The last encounter focused on the following: initial assessment    Testing/hospitalizations/procedures performed since last encounter: ECHO with restrictive diastolic filling and normal EF, holter essentially normal    Since the last encounter, the patient has not had new symptoms/events. Past Medical History:   Diagnosis Date    BPV (benign positional vertigo) 2019    CAD (coronary artery disease)     Dr Titus Elena COPD (chronic obstructive pulmonary disease) ([facility] [address] 2012, VQ scan and CXR    has supplemental oxygen, uses it for 4-6 hours nightly, no inhalers use    Diverticulosis     Hiatal hernia     Hiatal hernia with gastroesophageal reflux 2015    EGD Dr Tony Saldana, [facility]    History of blood transfusion     History of colonoscopy with polypectomy 2017    Dr Pipe Mcgill History of sleep apnea 2011    no CPAP use    Hypertension     Hypothyroidism     Nocturnal hypoxia 2011    nocturnal O2, 3 LNC, 4-6 hours nightly    Obesity (BMI 30-39. 9)     Osteoarthritis of multiple joints     Oxygen dependent 2019    Postnasal drip     Sigmoid diverticulosis     Weakness of both legs        Past Surgical History:   Procedure Laterality Date    HYSTERECTOMY      fibroid, bleeding    JOINT REPLACEMENT      DE COLON CA SCRN NOT HI RSK IND N/A 9/1/2017    COLONOSCOPY performed by Stacy Castellanos MD at [address] ESOPHAGOGASTRODUODENOSCOPY TRANSORAL DIAGNOSTIC N/A 9/1/2017    EGD ESOPHAGOGASTRODUODENOSCOPY performed by Stacy Castellanos MD at [address] [facility],  [address] Right 04/03/2012    TOTAL KNEE ARTHROPLASTY Bilateral left x 2, right x1 (Dr Kayode Rodriguez)   Vil[facility] Boy ENDOSCOPY  12/29/15    Dr. Doyle Rosario History   Problem Relation Age of Onset    Diabetes Maternal Aunt     Osteoarthritis Father     Osteoarthritis Sister     Osteoarthritis Brother     Hypertension Mother     Heart Failure Mother         dec age 71, rheumatic       Social History     Socioeconomic History    Marital status:       Spouse name: Not on file    Number of children: 9    Years of education: Not on file    Highest education level: Not on file   Occupational History    Occupation: homemaker and , retired   Social Needs    Financial resource strain: Not on file    Food insecurity     Worry: Not on file     Inability: Not on file   [employer] needs     Medical: Not on file     Non-medical: Not on file   Tobacco Use    Smoking status: Former Smoker    Smokeless tobacco: Never Used    Tobacco comment: quit 50 years ago   Substance and Sexual Activity    Alcohol use: No     Alcohol/week: 0.0 standard drinks    Drug use: No    Sexual activity: Never   Lifestyle    Physical activity     Days per week: Not on file     Minutes per session: Not on file    Stress: Not on file   Relationships    Social connections     Talks on phone: Not on file     Gets together: Not on file     Attends [employer] service: Not on file     Active member of club or organization: Not on file     Attends meetings of clubs or organizations: Not on file     Relationship status: Not on file    Intimate partner violence     Fear of current or ex partner: Not on file     Emotionally abused: Not on file     Physically abused: Not on file     Forced sexual activity: Not on file   Other Topics Concern    Not on file   Social History Narrative    Born in [place] (grandparents came from [place], settled in [place])    ,  (X 2)    Lives in a house with two of her sons    7 children, 3 M, 1 F    Lost 63 yo son in Feb 2018 from lung cancer    Was  for the [employer], [employer], cooking, housework       Allergies   Allergen Reactions    Ciprofloxacin Hcl     Lipitor [Atorvastatin]     Zetia [Ezetimibe]        Current Outpatient Medications   Medication Sig Dispense Refill    fluticasone (FLONASE) 50 MCG/ACT nasal spray INHALE 2 sprays Nasally daily. 16 g 2    amLODIPine (NORVASC) 2.5 MG tablet TAKE 1 TABLET BY MOUTH DAILY (before lunch) 90 tablet 1    sertraline (ZOLOFT) 50 MG tablet Take 1 tablet by mouth every evening 90 tablet 3    rosuvastatin (CRESTOR) 10 MG tablet TAKE 1 TABLET BY MOUTH EVERY DAY 90 tablet 3    pantoprazole (PROTONIX) 40 MG tablet Take 1 tablet by mouth daily as needed (heartburn) 90 tablet 3    valsartan (DIOVAN) 160 MG tablet Take 2 tablets by mouth nightly 180 tablet 3    levothyroxine (SYNTHROID) 50 MCG tablet Take 1 tablet by mouth every morning (before breakfast) 90 tablet 3    polyethylene glycol (GLYCOLAX) 17 GM/SCOOP powder Take 17 g by mouth daily as needed (constipation)      nitroGLYCERIN (NITROSTAT) 0.4 MG SL tablet Place 1 tablet under the tongue every 5 minutes as needed for Chest pain 25 tablet 6    diclofenac sodium (VOLTAREN) 1 % GEL Apply 4 g topically 4 times daily as needed for Pain (arthritis/joint pain) 2 Tube 2    aspirin 81 MG tablet Take 1 tablet by mouth daily 90 tablet 3    magnesium oxide (MAG-OX) 140 MG CAPS Take 140 mg by mouth daily      Misc.  Devices MISC Portable Oxygen concentrator 1 Device 0    acetaminophen (TYLENOL) 500 MG tablet Take 500 mg by mouth every 6 hours as needed for Pain (1- 2 tabs in am) Indications: for pain      vitamin B-12 (CYANOCOBALAMIN) 1000 MCG tablet Take 1,000 mcg by mouth daily Indications: in am.      Cholecalciferol (VITAMIN D3) 2000 units CAPS Take 2,000 Units by mouth Daily with supper       Multiple Vitamins-Minerals (MULTIVITAMIN PO) Take 1 tablet by mouth daily       OXYGEN Inhale 3 L into the lungs continuous Uses prn at bedtime       No current facility-administered medications for this visit. Review of Systems:   Review of Systems   Constitutional: Negative for activity change and appetite change. HENT: Negative for congestion. Respiratory: Negative for apnea, choking and chest tightness. Cardiovascular: Negative for chest pain. Gastrointestinal: Negative for abdominal distention and abdominal pain. Endocrine: Negative for cold intolerance and heat intolerance. Genitourinary: Negative for dysuria and enuresis. Musculoskeletal: Negative for arthralgias and back pain. Skin: Negative for color change. Allergic/Immunologic: Negative. Neurological: Negative for dizziness, seizures, syncope and light-headedness. Psychiatric/Behavioral: Negative for agitation, behavioral problems and confusion. Physical Examination:    /80 (Site: Left Upper Arm, Position: Sitting, Cuff Size: Large Adult)   Pulse 87   Wt 199 lb (90.3 kg)   SpO2 95%   BMI 33.12 kg/m²    Physical Exam   Constitutional: The patient appears healthy. No distress. HENT: Mouth/Throat: Oropharynx is clear. Eyes: Pupils are equal, round, and reactive to light. Neck: Normal range of motion. No JVD present. Cardiovascular: Regular rhythm, S1 normal, S2 normal, normal heart sounds and intact distal pulses. Exam reveals no gallop. No murmur heard. Pulses:       Radial pulses are 2+ on the right side. Dorsalis pedis pulses are 2+ on the right side. Pulmonary/Chest: Effort normal and breath sounds normal. No wheezes. No rales. No tenderness. Abdominal: Soft. Bowel sounds are normal.   Musculoskeletal: Normal range of motion. No edema. Neurological: The patient is alert and oriented to person, place, and time. Intact cranial nerves. Skin: Skin is warm and dry. No rash noted.        LABS:  CBC:   Lab Results   Component Value Date    WBC 6.4 07/27/2020    RBC 4.87 07/27/2020    RBC 4.37 History of sleep apnea    Essential hypertension    Vitamin D deficiency    At high risk for falls    Benign paroxysmal positional vertigo    Coronary artery disease involving native coronary artery of native heart without angina pectoris    AV block, 1st degree    Dizziness    Abnormal EKG    At high risk for injury related to fall    Weakness of both legs    Aortic ejection murmur       Medications:  Current Outpatient Medications   Medication Sig Dispense Refill    fluticasone (FLONASE) 50 MCG/ACT nasal spray INHALE 2 sprays Nasally daily. 16 g 2    amLODIPine (NORVASC) 2.5 MG tablet TAKE 1 TABLET BY MOUTH DAILY (before lunch) 90 tablet 1    sertraline (ZOLOFT) 50 MG tablet Take 1 tablet by mouth every evening 90 tablet 3    rosuvastatin (CRESTOR) 10 MG tablet TAKE 1 TABLET BY MOUTH EVERY DAY 90 tablet 3    pantoprazole (PROTONIX) 40 MG tablet Take 1 tablet by mouth daily as needed (heartburn) 90 tablet 3    valsartan (DIOVAN) 160 MG tablet Take 2 tablets by mouth nightly 180 tablet 3    levothyroxine (SYNTHROID) 50 MCG tablet Take 1 tablet by mouth every morning (before breakfast) 90 tablet 3    polyethylene glycol (GLYCOLAX) 17 GM/SCOOP powder Take 17 g by mouth daily as needed (constipation)      nitroGLYCERIN (NITROSTAT) 0.4 MG SL tablet Place 1 tablet under the tongue every 5 minutes as needed for Chest pain 25 tablet 6    diclofenac sodium (VOLTAREN) 1 % GEL Apply 4 g topically 4 times daily as needed for Pain (arthritis/joint pain) 2 Tube 2    aspirin 81 MG tablet Take 1 tablet by mouth daily 90 tablet 3    magnesium oxide (MAG-OX) 140 MG CAPS Take 140 mg by mouth daily      Misc.  Devices MISC Portable Oxygen concentrator 1 Device 0    acetaminophen (TYLENOL) 500 MG tablet Take 500 mg by mouth every 6 hours as needed for Pain (1- 2 tabs in am) Indications: for pain      vitamin B-12 (CYANOCOBALAMIN) 1000 MCG tablet Take 1,000 mcg by mouth daily Indications: in am.      Cholecalciferol (VITAMIN D3) 2000 units CAPS Take 2,000 Units by mouth Daily with supper       Multiple Vitamins-Minerals (MULTIVITAMIN PO) Take 1 tablet by mouth daily       OXYGEN Inhale 3 L into the lungs continuous Uses prn at bedtime       No current facility-administered medications for this visit. Assessment/Plan:    1. Dizziness      2. AV block, 1st degree      3. Abnormal EKG      4. Essential hypertension      5. Coronary artery disease involving native coronary artery of native heart without angina pectoris  APT, statin, b-blocker and RF modification         Counseling: the patient was counseled regarding diet, exercise, weight control and heart healthy lifestyle. Return in about 6 months (around 4/12/2021). Electronically signed by   Cesilia Roland.  Xochitl Lo MD [facility] Director of Cardiology Services and Cardiac Catheterization Laboratory  [facility], [place]    on 10/18/2020 at 11:54 PM

## 2020-12-14 ENCOUNTER — VIRTUAL VISIT (OUTPATIENT)
Dept: FAMILY MEDICINE CLINIC | Age: 85
End: 2020-12-14
Payer: MEDICARE

## 2020-12-14 PROBLEM — E53.8 B12 DEFICIENCY: Status: ACTIVE | Noted: 2020-12-14

## 2020-12-14 PROBLEM — R53.82 CHRONIC FATIGUE: Status: ACTIVE | Noted: 2020-12-14

## 2020-12-14 PROCEDURE — 1123F ACP DISCUSS/DSCN MKR DOCD: CPT | Performed by: PHYSICIAN ASSISTANT

## 2020-12-14 PROCEDURE — 4040F PNEUMOC VAC/ADMIN/RCVD: CPT | Performed by: PHYSICIAN ASSISTANT

## 2020-12-14 PROCEDURE — 99214 OFFICE O/P EST MOD 30 MIN: CPT | Performed by: PHYSICIAN ASSISTANT

## 2020-12-14 PROCEDURE — G8428 CUR MEDS NOT DOCUMENT: HCPCS | Performed by: PHYSICIAN ASSISTANT

## 2020-12-14 PROCEDURE — 1090F PRES/ABSN URINE INCON ASSESS: CPT | Performed by: PHYSICIAN ASSISTANT

## 2020-12-14 ASSESSMENT — ENCOUNTER SYMPTOMS
BLOOD IN STOOL: 0
CONSTIPATION: 0
SHORTNESS OF BREATH: 0
TROUBLE SWALLOWING: 0
DIARRHEA: 0
NAUSEA: 0
WHEEZING: 0
COLOR CHANGE: 0
CHEST TIGHTNESS: 0
ABDOMINAL PAIN: 0
ABDOMINAL DISTENTION: 0

## 2020-12-14 NOTE — PROGRESS NOTES
2020    TELEHEALTH EVALUATION -- Audio/Visual (During ESVAC-72 public health emergency)    Due to COVID 19 outbreak, patient's office visit was converted to a virtual visit. Patient was contacted and agreed to proceed with a virtual visit via Telephone Visit--total length 15-20 min  The risks and benefits of converting to a virtual visit were discussed in light of the current infectious disease epidemic. Patient also understood that insurance coverage and co-pays are up to their individual insurance plans. HPI:    Dhiraj Templeton (:  1933) has requested an audio/video evaluation for the following concern(s):    On the phone today for follow up. Last OV with me: 2020. Overall, feeling well. Dizziness. Several months ago, patient was having recurrent episodes of dizziness with falls at home. Had work-up last month including EKG in office, lab work. She saw Dr. Xochitl Lo for 1st degree AV block. He ordered 48 hr holter monitor which showed no significant events. Has had very little episodes of dizziness since last OV. Continuing to use flonase daily. PRN allegra when ears feel full--which has helped. Fatigue. C/o increased fatigue/daytime tiredness. History of hypoxia in which she wears 3L of oxygen continuously. Does not wear at night as she \"sin't going anywhere or moving\". Denies CP, dizziness, worsening SOB.      HLD. Remains on statin therapy. Denies any sdie effects. CAD/HTN. Remains on medication for HTN/HLD. No use of SL nitro. Hypothyroidism. Remains on synthroid. Doing well on medication. B12 and vit D def. Remains on replacement therapy. Review of Systems   Constitutional: Positive for fatigue. Negative for activity change, appetite change, chills, diaphoresis and fever. HENT: Negative for congestion and trouble swallowing. Respiratory: Negative for chest tightness, shortness of breath and wheezing.     Cardiovascular: Negative for chest pain, palpitations and leg swelling. Gastrointestinal: Negative for abdominal distention, abdominal pain, blood in stool, constipation, diarrhea and nausea. Genitourinary: Negative for dysuria and frequency. Musculoskeletal: Positive for arthralgias and gait problem (ambulates with walker). Skin: Negative for color change. Neurological: Negative for dizziness, seizures, weakness, light-headedness, numbness and headaches. Psychiatric/Behavioral: Negative for dysphoric mood and sleep disturbance. Prior to Visit Medications    Medication Sig Taking? Authorizing Provider   amLODIPine (NORVASC) 2.5 MG tablet TAKE 1 TABLET BY MOUTH DAILY (before lunch) Yes Tova Mazariegos PA-C   diclofenac sodium (VOLTAREN) 1 % GEL Apply 4 grams topically 4 times daily as needed for Pain (arthritis/joint pain) Yes Tova Mazariegos PA-C   fluticasone (FLONASE) 50 MCG/ACT nasal spray INHALE 2 sprays Nasally daily.  Yes Tova Mazariegos PA-C   sertraline (ZOLOFT) 50 MG tablet Take 1 tablet by mouth every evening Yes Tova Mazariegos PA-C   rosuvastatin (CRESTOR) 10 MG tablet TAKE 1 TABLET BY MOUTH EVERY DAY Yes Tova Mazariegos PA-C   pantoprazole (PROTONIX) 40 MG tablet Take 1 tablet by mouth daily as needed (heartburn) Yes Tova Mazariegos PA-C   valsartan (DIOVAN) 160 MG tablet Take 2 tablets by mouth nightly Yes Tova Mazariegos PA-C   levothyroxine (SYNTHROID) 50 MCG tablet Take 1 tablet by mouth every morning (before breakfast) Yes Tova Mazariegos PA-C   polyethylene glycol (GLYCOLAX) 17 GM/SCOOP powder Take 17 g by mouth daily as needed (constipation) Yes Historical Provider, MD   nitroGLYCERIN (NITROSTAT) 0.4 MG SL tablet Place 1 tablet under the tongue every 5 minutes as needed for Chest pain Yes Tova Mazariegos PA-C   aspirin 81 MG tablet Take 1 tablet by mouth daily Yes Liam Ferreira MD   acetaminophen (TYLENOL) 500 MG tablet Take 500 mg by mouth every 6 hours as needed for Pain (1- 2 tabs in am) Indications: for pain Yes Historical Provider, MD   vitamin B-12 (CYANOCOBALAMIN) 1000 MCG tablet Take 1,000 mcg by mouth daily Indications: in am. Yes Historical Provider, MD   Cholecalciferol (VITAMIN D3) 2000 units CAPS Take 2,000 Units by mouth Daily with supper  Yes Historical Provider, MD   Multiple Vitamins-Minerals (MULTIVITAMIN PO) Take 1 tablet by mouth daily  Yes Historical Provider, MD   OXYGEN Inhale 3 L into the lungs continuous Uses prn at bedtime Yes Historical Provider, MD   magnesium oxide (MAG-OX) 140 MG CAPS Take 140 mg by mouth daily  Historical Provider, MD   Misc. Devices MISC Portable Oxygen concentrator  Kyler Abdi MD       Social History     Tobacco Use    Smoking status: Former Smoker    Smokeless tobacco: Never Used    Tobacco comment: quit 50 years ago   Substance Use Topics    Alcohol use: No     Alcohol/week: 0.0 standard drinks    Drug use: No      Allergies   Allergen Reactions    Ciprofloxacin Hcl     Lipitor [Atorvastatin]     Zetia [Ezetimibe]    ,   Past Medical History:   Diagnosis Date    BPV (benign positional vertigo) 2019    CAD (coronary artery disease)     Dr Jaime Akins [facility], [facility] and CXR    has supplemental oxygen, uses it for 4-6 hours nightly, no inhalers use    Diverticulosis     Hiatal hernia     Hiatal hernia with gastroesophageal reflux 2015    [facility]    History of blood transfusion     History of colonoscopy with polypectomy 2017    Dr Barby Johnston History of sleep apnea 2011    no CPAP use    Hypertension     Hypothyroidism     Nocturnal hypoxia 2011    nocturnal O2, 3 LNC, 4-6 hours nightly    Obesity (BMI 30-39. 9)     Osteoarthritis of multiple joints     Oxygen dependent 2019    Postnasal drip     Sigmoid diverticulosis     Weakness of both legs    ,   Past Surgical History:   Procedure Laterality Date    HYSTERECTOMY      fibroid, bleeding    JOINT REPLACEMENT      IA COLON CA SCRN NOT HI RSK IND N/A 9/1/2017    COLONOSCOPY performed by Mayra Garcia MD at [address] [facility] ESOPHAGOGASTRODUODENOSCOPY TRANSORAL DIAGNOSTIC N/A 9/1/2017    EGD ESOPHAGOGASTRODUODENOSCOPY performed by Mayra Garcia MD at [address] Right 04/03/2012    TOTAL KNEE ARTHROPLASTY Bilateral     left x 2, right x1 (Dr Abimbola Trevizo)   [address] [facility] [address]  12/29/15    Dr. Hudson Celaya   ,   Social History     Tobacco Use    Smoking status: Former Smoker    Smokeless tobacco: Never Used    Tobacco comment: quit 50 years ago   Substance Use Topics    Alcohol use: No     Alcohol/week: 0.0 standard drinks    Drug use: No   ,   Family History   Problem Relation Age of Onset    Diabetes Maternal Aunt     Osteoarthritis Father     Osteoarthritis Sister     Osteoarthritis Brother     Hypertension Mother     Heart Failure Mother         dec age 71, rheumatic   ,   Immunization History   Administered Date(s) Administered    Influenza Vaccine, unspecified formulation 10/20/2015    Influenza Virus Vaccine 10/20/2014    Influenza, High Dose (Fluzone 65 yrs and older) 10/20/2015, 09/01/2017, 12/17/2018    Influenza, Quadv, adjuvanted, 65 yrs +, IM, PF (Fluad) 09/21/2020    Influenza, Triv, inactivated, subunit, adjuvanted, IM (Fluad 65 yrs and older) 09/20/2019    Pneumococcal Polysaccharide ([id number]) 06/22/2020    Tetanus 06/02/2015    Tetanus Toxoid, absorbed 06/02/2015   ,   Health Maintenance   Topic Date Due    Shingles Vaccine (1 of 2) 06/19/1983    Lipid screen  09/20/2020    DTaP/Tdap/Td vaccine (1 - Tdap) 06/22/2021 (Originally 6/19/1952)    Annual Wellness Visit (AWV)  06/23/2021    Potassium monitoring  07/27/2021    Creatinine monitoring  07/27/2021    Flu vaccine  Completed    Pneumococcal 65+ years Vaccine  Completed    Hepatitis A vaccine  Aged Out    Hepatitis B vaccine  Aged Out    Hib vaccine  Aged Out    Meningococcal (ACWY) vaccine Aged Out       PHYSICAL EXAMINATION:  [ INSTRUCTIONS:  \"[x]\" Indicates a positive item  \"[]\" Indicates a negative item  -- DELETE ALL ITEMS NOT EXAMINED]  [x] Alert  [x] Oriented to person/place/time    [] No apparent distress  [] Toxic appearing    [] Face flushed appearing [] Sclera clear  [] Lips are cyanotic      [x] Breathing appears normal  [] Appears tachypneic      [] Rash on visible skin    [] Cranial Nerves II-XII grossly intact    [] Motor grossly intact in visible upper extremities    [] Motor grossly intact in visible lower extremities    [x] Normal Mood  [] Anxious appearing    [] Depressed appearing  [] Confused appearing      [] Poor short term memory  [] Poor long term memory    [] OTHER:      Due to this being a TeleHealth encounter, evaluation of the following organ systems is limited: Vitals/Constitutional/EENT/Resp/CV/GI//MS/Neuro/Skin/Heme-Lymph-Imm. ASSESSMENT/PLAN:  1. Chronic obstructive pulmonary disease, unspecified COPD type ([address]  On 3L of oxygen during the day. 2. AV block, 1st degree  Monitor at this time. No interventions needed at this time by cardiology. 3. Coronary artery disease involving native coronary artery of native heart without angina pectoris  Continue current medications. 4. Essential hypertension  On medication. Controlled. 5. Gastroesophageal reflux disease without esophagitis  Protonix. 6. Benign paroxysmal positional vertigo, unspecified laterality      7. Dizziness      8. Vitamin D deficiency  On replacement    9. B12 deficiency  On replacement. 10. Chronic fatigue  Instructed patient to wear oxygen at night to see if fatigue improves. Call Friday for update. 3 month follow up with me. No follow-ups on file. An  electronic signature was used to authenticate this note.     --Capri Larios PA-C on 12/14/2020 at 11:57 AM        Pursuant to the emergency declaration under the [address] [address], [address] [place] [facility] authority and the Coronavirus Preparedness and [employer] Act, this Virtual  Visit was conducted, with patient's consent, to reduce the patient's risk of exposure to COVID-19 and provide continuity of care for an established patient. Services were provided through a video synchronous discussion virtually to substitute for in-person clinic visit.

## 2021-03-26 ENCOUNTER — OFFICE VISIT (OUTPATIENT)
Dept: FAMILY MEDICINE CLINIC | Age: 86
End: 2021-03-26
Payer: MEDICARE

## 2021-03-26 VITALS
WEIGHT: 203 LBS | SYSTOLIC BLOOD PRESSURE: 112 MMHG | RESPIRATION RATE: 18 BRPM | HEIGHT: 65 IN | OXYGEN SATURATION: 99 % | TEMPERATURE: 97 F | HEART RATE: 85 BPM | BODY MASS INDEX: 33.82 KG/M2 | DIASTOLIC BLOOD PRESSURE: 74 MMHG

## 2021-03-26 DIAGNOSIS — J44.9 CHRONIC OBSTRUCTIVE PULMONARY DISEASE, UNSPECIFIED COPD TYPE (HCC): Primary | ICD-10-CM

## 2021-03-26 DIAGNOSIS — I44.0 AV BLOCK, 1ST DEGREE: ICD-10-CM

## 2021-03-26 DIAGNOSIS — E78.2 MIXED HYPERLIPIDEMIA: ICD-10-CM

## 2021-03-26 DIAGNOSIS — R53.82 CHRONIC FATIGUE: ICD-10-CM

## 2021-03-26 DIAGNOSIS — M25.552 BILATERAL HIP PAIN: ICD-10-CM

## 2021-03-26 DIAGNOSIS — M25.562 CHRONIC PAIN OF BOTH KNEES: ICD-10-CM

## 2021-03-26 DIAGNOSIS — E55.9 VITAMIN D DEFICIENCY: ICD-10-CM

## 2021-03-26 DIAGNOSIS — E03.9 HYPOTHYROIDISM, UNSPECIFIED TYPE: ICD-10-CM

## 2021-03-26 DIAGNOSIS — M25.561 CHRONIC PAIN OF BOTH KNEES: ICD-10-CM

## 2021-03-26 DIAGNOSIS — E53.8 B12 DEFICIENCY: ICD-10-CM

## 2021-03-26 DIAGNOSIS — M15.9 OSTEOARTHRITIS OF MULTIPLE JOINTS, UNSPECIFIED OSTEOARTHRITIS TYPE: ICD-10-CM

## 2021-03-26 DIAGNOSIS — J44.9 CHRONIC OBSTRUCTIVE PULMONARY DISEASE, UNSPECIFIED COPD TYPE (HCC): ICD-10-CM

## 2021-03-26 DIAGNOSIS — M25.551 BILATERAL HIP PAIN: ICD-10-CM

## 2021-03-26 DIAGNOSIS — G89.29 CHRONIC PAIN OF BOTH KNEES: ICD-10-CM

## 2021-03-26 DIAGNOSIS — I10 ESSENTIAL HYPERTENSION: ICD-10-CM

## 2021-03-26 LAB
ALBUMIN SERPL-MCNC: 4.4 G/DL (ref 3.5–4.6)
ALP BLD-CCNC: 87 U/L (ref 40–130)
ALT SERPL-CCNC: 13 U/L (ref 0–33)
ANION GAP SERPL CALCULATED.3IONS-SCNC: 12 MEQ/L (ref 9–15)
AST SERPL-CCNC: 15 U/L (ref 0–35)
BASOPHILS ABSOLUTE: 0 K/UL (ref 0–0.2)
BASOPHILS RELATIVE PERCENT: 0.6 %
BILIRUB SERPL-MCNC: 0.5 MG/DL (ref 0.2–0.7)
BUN BLDV-MCNC: 17 MG/DL (ref 8–23)
CALCIUM SERPL-MCNC: 10.1 MG/DL (ref 8.5–9.9)
CHLORIDE BLD-SCNC: 98 MEQ/L (ref 95–107)
CHOLESTEROL, TOTAL: 136 MG/DL (ref 0–199)
CO2: 29 MEQ/L (ref 20–31)
CREAT SERPL-MCNC: 0.55 MG/DL (ref 0.5–0.9)
EOSINOPHILS ABSOLUTE: 0.1 K/UL (ref 0–0.7)
EOSINOPHILS RELATIVE PERCENT: 2.1 %
FOLATE: >20 NG/ML (ref 7.3–26.1)
GFR AFRICAN AMERICAN: >60
GFR NON-AFRICAN AMERICAN: >60
GLOBULIN: 3.7 G/DL (ref 2.3–3.5)
GLUCOSE BLD-MCNC: 71 MG/DL (ref 70–99)
HCT VFR BLD CALC: 43.7 % (ref 37–47)
HDLC SERPL-MCNC: 49 MG/DL (ref 40–59)
HEMOGLOBIN: 14.5 G/DL (ref 12–16)
LDL CHOLESTEROL CALCULATED: 65 MG/DL (ref 0–129)
LYMPHOCYTES ABSOLUTE: 0.8 K/UL (ref 1–4.8)
LYMPHOCYTES RELATIVE PERCENT: 11.9 %
MCH RBC QN AUTO: 30.3 PG (ref 27–31.3)
MCHC RBC AUTO-ENTMCNC: 33.2 % (ref 33–37)
MCV RBC AUTO: 91.2 FL (ref 82–100)
MONOCYTES ABSOLUTE: 0.7 K/UL (ref 0.2–0.8)
MONOCYTES RELATIVE PERCENT: 10.1 %
NEUTROPHILS ABSOLUTE: 5 K/UL (ref 1.4–6.5)
NEUTROPHILS RELATIVE PERCENT: 75.3 %
PDW BLD-RTO: 14.2 % (ref 11.5–14.5)
PLATELET # BLD: 209 K/UL (ref 130–400)
POTASSIUM SERPL-SCNC: 4.1 MEQ/L (ref 3.4–4.9)
RBC # BLD: 4.79 M/UL (ref 4.2–5.4)
SODIUM BLD-SCNC: 139 MEQ/L (ref 135–144)
TOTAL PROTEIN: 8.1 G/DL (ref 6.3–8)
TRIGL SERPL-MCNC: 112 MG/DL (ref 0–150)
TSH SERPL DL<=0.05 MIU/L-ACNC: 2.7 UIU/ML (ref 0.44–3.86)
VITAMIN B-12: >2000 PG/ML (ref 232–1245)
VITAMIN D 25-HYDROXY: 32.8 NG/ML (ref 30–100)
WBC # BLD: 6.7 K/UL (ref 4.8–10.8)

## 2021-03-26 PROCEDURE — 99214 OFFICE O/P EST MOD 30 MIN: CPT | Performed by: PHYSICIAN ASSISTANT

## 2021-03-26 PROCEDURE — G8926 SPIRO NO PERF OR DOC: HCPCS | Performed by: PHYSICIAN ASSISTANT

## 2021-03-26 PROCEDURE — G8484 FLU IMMUNIZE NO ADMIN: HCPCS | Performed by: PHYSICIAN ASSISTANT

## 2021-03-26 PROCEDURE — G8417 CALC BMI ABV UP PARAM F/U: HCPCS | Performed by: PHYSICIAN ASSISTANT

## 2021-03-26 PROCEDURE — G8427 DOCREV CUR MEDS BY ELIG CLIN: HCPCS | Performed by: PHYSICIAN ASSISTANT

## 2021-03-26 PROCEDURE — 3023F SPIROM DOC REV: CPT | Performed by: PHYSICIAN ASSISTANT

## 2021-03-26 PROCEDURE — 1036F TOBACCO NON-USER: CPT | Performed by: PHYSICIAN ASSISTANT

## 2021-03-26 PROCEDURE — 1123F ACP DISCUSS/DSCN MKR DOCD: CPT | Performed by: PHYSICIAN ASSISTANT

## 2021-03-26 PROCEDURE — 4040F PNEUMOC VAC/ADMIN/RCVD: CPT | Performed by: PHYSICIAN ASSISTANT

## 2021-03-26 PROCEDURE — 1090F PRES/ABSN URINE INCON ASSESS: CPT | Performed by: PHYSICIAN ASSISTANT

## 2021-03-26 SDOH — ECONOMIC STABILITY: TRANSPORTATION INSECURITY
IN THE PAST 12 MONTHS, HAS LACK OF TRANSPORTATION KEPT YOU FROM MEETINGS, WORK, OR FROM GETTING THINGS NEEDED FOR DAILY LIVING?: NOT ASKED

## 2021-03-26 ASSESSMENT — PATIENT HEALTH QUESTIONNAIRE - PHQ9
SUM OF ALL RESPONSES TO PHQ9 QUESTIONS 1 & 2: 0
SUM OF ALL RESPONSES TO PHQ QUESTIONS 1-9: 0

## 2021-03-26 NOTE — PROGRESS NOTES
Subjective  Mary Mustafa, 80 y.o. female presents today with:  Chief Complaint   Patient presents with    Fatigue     3 month follow up patient states she is fatigue and sleeps a lot     Arthritis     increased pain taking tylenol      HPI  Bill Champagne is in the office today for follow up. Daughter, Nick Maria, with patient. Last OV with me: 12/14/2020 via VV. Due for routine lab monitoring. Fatigue. C/o increased fatigue/daytime tiredness. History of hypoxia in which she wears 3L of oxygen continuously. Does not wear at night as she \"sin't going anywhere or moving\". Denies CP, dizziness, worsening SOB.      Chronic joint pain. Reports worsening bilateral knee and hip pain. History of arthritis with L TKA x 2.    Stiffness and discomfort have progressed to the point that she has difficulty with ambulating. Pain flexing and abducting at L hip joint to get in/out of cars. Ambulating with rollator. Had several falls last year due to limited joint mobility and pain. At this time, she is taking tylenol which provides little relief. Open to ortho referral to discuss treatment options as pain is limiting her ADLs.    HLD. Remains on statin therapy. Denies any side effects. No myalgias or muscle weakness.       CAD/HTN. Remains on medication for HTN/HLD. No use of SL nitro. Denies CP, dizziness, SOB, GORMAN.       Hypothyroidism. Remains on synthroid. Doing well on medication. Due for TSH monitoring. B12 and vit D def. Remains on replacement therapy. Review of Systems   Constitutional: Positive for fatigue. Negative for activity change, appetite change, chills, diaphoresis and fever. HENT: Negative for congestion and trouble swallowing. Respiratory: Negative for chest tightness, shortness of breath and wheezing. Cardiovascular: Negative for chest pain, palpitations and leg swelling.    Gastrointestinal: Negative for abdominal distention, abdominal pain, blood in stool, constipation, diarrhea and nausea. Genitourinary: Negative for dysuria and frequency. Musculoskeletal: Positive for arthralgias, gait problem (ambulates with walker) and joint swelling. Negative for myalgias and neck pain. Skin: Negative for color change. Neurological: Positive for weakness (BLE). Negative for dizziness, seizures, light-headedness, numbness and headaches. Psychiatric/Behavioral: Negative for dysphoric mood and sleep disturbance. Past Medical History:   Diagnosis Date    BPV (benign positional vertigo) 2019    CAD (coronary artery disease)     Dr Ha Barr COPD (chronic obstructive pulmonary disease) ([facility] [address] 2012, VQ scan and CXR    has supplemental oxygen, uses it for 4-6 hours nightly, no inhalers use    Diverticulosis     Hiatal hernia     Hiatal hernia with gastroesophageal reflux 2015    EGD Dr Leon Christine, [facility]    History of blood transfusion     History of colonoscopy with polypectomy 2017    Dr Elizabeth Ha History of sleep apnea 2011    no CPAP use    Hypertension     Hypothyroidism     Nocturnal hypoxia 2011    nocturnal O2, 3 LNC, 4-6 hours nightly    Obesity (BMI 30-39. 9)     Osteoarthritis of multiple joints     Oxygen dependent 2019    Postnasal drip     Sigmoid diverticulosis     Weakness of both legs      Past Surgical History:   Procedure Laterality Date    HYSTERECTOMY      fibroid, bleeding    JOINT REPLACEMENT      MO COLON CA SCRN NOT HI RSK IND N/A 9/1/2017    COLONOSCOPY performed by Adelaide Villasenor MD at [address] [facility] ESOPHAGOGASTRODUODENOSCOPY TRANSORAL DIAGNOSTIC N/A 9/1/2017    EGD ESOPHAGOGASTRODUODENOSCOPY performed by Adelaide Villasenor MD at [address] [facility], [address] Right 04/03/2012    TOTAL KNEE ARTHROPLASTY Bilateral     left x 2, right x1 (Dr Nadege Morrison)   Teretha Favor  12/29/15    Dr. Laurel Urena Marital status:       Spouse name: Not on file    Number of children: 7    Years of education: Not on file    Highest education level: Not on file   Occupational History    Occupation: homemaker and , retired   Social Needs    Financial resource strain: Not hard at all   [place] insecurity     Worry: Not on file     Inability: Not on file   [employer] needs     Medical: Not on file     Non-medical: Not on file   Tobacco Use    Smoking status: Former Smoker    Smokeless tobacco: Never Used    Tobacco comment: quit 50 years ago   Substance and Sexual Activity    Alcohol use: No     Alcohol/week: 0.0 standard drinks    Drug use: No    Sexual activity: Never   Lifestyle    Physical activity     Days per week: Not on file     Minutes per session: Not on file    Stress: Not on file   Relationships    Social connections     Talks on phone: Not on file     Gets together: Not on file     Attends Hindu service: Not on file     Active member of club or organization: Not on file     Attends meetings of clubs or organizations: Not on file     Relationship status: Not on file    Intimate partner violence     Fear of current or ex partner: Not on file     Emotionally abused: Not on file     Physically abused: Not on file     Forced sexual activity: Not on file   Other Topics Concern    Not on file   Social History Narrative    Born in [place] (grandparents came from [place], settled in [place])    ,  (X 2)    Lives in a house with two of her sons    7 children, 3 M, 1 F    Lost 63 yo son in Feb 2018 from lung cancer    Was  for the [employer], crosswords, cooking, housework     Family History   Problem Relation Age of Onset    Diabetes Maternal Aunt     Osteoarthritis Father     Osteoarthritis Sister     Osteoarthritis Brother     Hypertension Mother     Heart Failure Mother         dec age 71, rheumatic     Allergies   Allergen Reactions    Ciprofloxacin Hcl     Lipitor [Atorvastatin]  Zetia [Ezetimibe]      Current Outpatient Medications   Medication Sig Dispense Refill    diclofenac (VOLTAREN) 50 MG EC tablet Take 1 tablet by mouth 2 times daily (with meals) Take as needed for joint pain. 60 tablet 1    fluticasone (FLONASE) 50 MCG/ACT nasal spray INHALE 2 sprays Nasally daily. 16 g 2    sertraline (ZOLOFT) 50 MG tablet Take 1 tablet by mouth daily 90 tablet 1    amLODIPine (NORVASC) 2.5 MG tablet TAKE 1 TABLET BY MOUTH DAILY (before lunch) 90 tablet 1    diclofenac sodium (VOLTAREN) 1 % GEL Apply 4 grams topically 4 times daily as needed for Pain (arthritis/joint pain) 200 g 2    rosuvastatin (CRESTOR) 10 MG tablet TAKE 1 TABLET BY MOUTH EVERY DAY 90 tablet 3    pantoprazole (PROTONIX) 40 MG tablet Take 1 tablet by mouth daily as needed (heartburn) 90 tablet 3    valsartan (DIOVAN) 160 MG tablet Take 2 tablets by mouth nightly 180 tablet 3    levothyroxine (SYNTHROID) 50 MCG tablet Take 1 tablet by mouth every morning (before breakfast) 90 tablet 3    polyethylene glycol (GLYCOLAX) 17 GM/SCOOP powder Take 17 g by mouth daily as needed (constipation)      nitroGLYCERIN (NITROSTAT) 0.4 MG SL tablet Place 1 tablet under the tongue every 5 minutes as needed for Chest pain 25 tablet 6    aspirin 81 MG tablet Take 1 tablet by mouth daily 90 tablet 3    magnesium oxide (MAG-OX) 140 MG CAPS Take 140 mg by mouth daily      Misc.  Devices MISC Portable Oxygen concentrator 1 Device 0    acetaminophen (TYLENOL) 500 MG tablet Take 500 mg by mouth every 6 hours as needed for Pain (1- 2 tabs in am) Indications: for pain      vitamin B-12 (CYANOCOBALAMIN) 1000 MCG tablet Take 1,000 mcg by mouth daily Indications: in am.      Cholecalciferol (VITAMIN D3) 2000 units CAPS Take 2,000 Units by mouth Daily with supper       Multiple Vitamins-Minerals (MULTIVITAMIN PO) Take 1 tablet by mouth daily       OXYGEN Inhale 3 L into the lungs continuous Uses prn at bedtime       No current facility-administered medications for this visit. PMH, Surgical Hx, Family Hx, and Social Hx reviewed and updated. Health Maintenance reviewed. Objective  Vitals:    03/26/21 1401   BP: 112/74   Pulse: 85   Resp: 18   Temp: 97 °F (36.1 °C)   TempSrc: Temporal   SpO2: 99%   Weight: 203 lb (92.1 kg)   Height: 5' 5\" (1.651 m)     BP Readings from Last 3 Encounters:   03/26/21 112/74   10/12/20 120/80   09/21/20 130/80     Wt Readings from Last 3 Encounters:   03/26/21 203 lb (92.1 kg)   10/12/20 199 lb (90.3 kg)   09/21/20 200 lb 3.2 oz (90.8 kg)     Physical Exam  Vitals signs reviewed. Constitutional:       General: She is not in acute distress. Appearance: Normal appearance. She is not ill-appearing. HENT:      Head: Normocephalic and atraumatic. Right Ear: External ear normal.      Left Ear: External ear normal.      Nose: Nose normal.   Cardiovascular:      Rate and Rhythm: Normal rate and regular rhythm. Pulmonary:      Effort: Pulmonary effort is normal.      Breath sounds: Normal breath sounds. Musculoskeletal:         General: Tenderness present. Right hip: She exhibits decreased range of motion and decreased strength. Left hip: She exhibits decreased range of motion and decreased strength. Right knee: She exhibits decreased range of motion. Tenderness found. Left knee: She exhibits decreased range of motion. Tenderness found. Right lower leg: Edema (pedal) present. Left lower leg: Edema (pedal) present. Skin:     General: Skin is warm. Neurological:      General: No focal deficit present. Mental Status: She is alert and oriented to person, place, and time. Psychiatric:         Mood and Affect: Mood normal.         Behavior: Behavior normal.         Thought Content: Thought content normal.         Judgment: Judgment normal.      Comments: Tired, received second COVID vaccine yesterady.        Assessment & Plan   Gavi Ochoa was seen today for fatigue and arthritis. Diagnoses and all orders for this visit:    Chronic obstructive pulmonary disease, unspecified COPD type ([address]  -     CBC Auto Differential; Future  -     Comprehensive Metabolic Panel; Future    Osteoarthritis of multiple joints, unspecified osteoarthritis type  -     diclofenac (VOLTAREN) 50 MG EC tablet; Take 1 tablet by mouth 2 times daily (with meals) Take as needed for joint pain. Vitamin D deficiency  -     Vitamin D 25 Hydroxy; Future    AV block, 1st degree    B12 deficiency  -     Vitamin B12 & Folate; Future    Chronic fatigue    Essential hypertension  -     CBC Auto Differential; Future  -     Comprehensive Metabolic Panel; Future  -     TSH Without Reflex; Future  -     Lipid Panel; Future    Mixed hyperlipidemia  -     Lipid Panel; Future    Hypothyroidism, unspecified type  -     TSH Without Reflex; Future    Bilateral hip pain  -     diclofenac (VOLTAREN) 50 MG EC tablet; Take 1 tablet by mouth 2 times daily (with meals) Take as needed for joint pain. -     Amb External Referral To Orthopedic Surgery    Chronic pain of both knees  -     diclofenac (VOLTAREN) 50 MG EC tablet; Take 1 tablet by mouth 2 times daily (with meals) Take as needed for joint pain. -     Amb External Referral To Orthopedic Surgery    Routine labs today. Start wearing oxygen at night, discussed nasal cannula and trimming ends for good oxygen flow. Referral to ortho for evaluation of worsening pain/stiffness/fall risk. 6 week follow up with me in the office.      Orders Placed This Encounter   Procedures    CBC Auto Differential     Standing Status:   Future     Number of Occurrences:   1     Standing Expiration Date:   3/26/2022    Comprehensive Metabolic Panel     Standing Status:   Future     Number of Occurrences:   1     Standing Expiration Date:   3/26/2022    TSH Without Reflex     Standing Status:   Future     Number of Occurrences:   1     Standing Expiration Date:   3/26/2022   [facility] Vitamin D 25 Hydroxy     Standing Status:   Future     Number of Occurrences:   1     Standing Expiration Date:   3/26/2022    Lipid Panel     Standing Status:   Future     Number of Occurrences:   1     Standing Expiration Date:   3/26/2022     Order Specific Question:   Is Patient Fasting?/# of Hours     Answer:   8+ hours    Vitamin B12 & Folate     Standing Status:   Future     Number of Occurrences:   1     Standing Expiration Date:   3/26/2022    Amb External Referral To Orthopedic Surgery     Referral Priority:   Routine     Referral Type:   Consult for Advice and Opinion     Referral Reason:   Specialty Services Required     Referred to Provider:   Romana Maker, MD     Requested Specialty:   Orthopedic Surgery     Number of Visits Requested:   1     Orders Placed This Encounter   Medications    diclofenac (VOLTAREN) 50 MG EC tablet     Sig: Take 1 tablet by mouth 2 times daily (with meals) Take as needed for joint pain. Dispense:  60 tablet     Refill:  1     Medications Discontinued During This Encounter   Medication Reason    fexofenadine (ALLEGRA) 180 MG tablet Side effects     No follow-ups on file. Reviewed with the patient: current clinical status, medications, activities and diet. Side effects, adverse effects of the medication prescribed today, as well as treatment plan/ rationale and result expectations have been discussed with the patient who expresses understanding and desires to proceed. Close follow up to evaluate treatment results and for coordination of care. I have reviewed the patient's medical history in detail and updated the computerized patient record.     Tova Mazariegos PA-C

## 2021-03-27 PROBLEM — G89.29 CHRONIC PAIN OF BOTH KNEES: Status: ACTIVE | Noted: 2021-03-27

## 2021-03-27 PROBLEM — M25.562 CHRONIC PAIN OF BOTH KNEES: Status: ACTIVE | Noted: 2021-03-27

## 2021-03-27 PROBLEM — M25.551 BILATERAL HIP PAIN: Status: ACTIVE | Noted: 2021-03-27

## 2021-03-27 PROBLEM — E78.2 MIXED HYPERLIPIDEMIA: Status: ACTIVE | Noted: 2021-03-27

## 2021-03-27 PROBLEM — M25.552 BILATERAL HIP PAIN: Status: ACTIVE | Noted: 2021-03-27

## 2021-03-27 PROBLEM — M25.561 CHRONIC PAIN OF BOTH KNEES: Status: ACTIVE | Noted: 2021-03-27

## 2021-03-27 PROBLEM — E03.9 HYPOTHYROIDISM: Status: ACTIVE | Noted: 2021-03-27

## 2021-03-27 ASSESSMENT — ENCOUNTER SYMPTOMS
WHEEZING: 0
ABDOMINAL PAIN: 0
DIARRHEA: 0
CONSTIPATION: 0
ABDOMINAL DISTENTION: 0
TROUBLE SWALLOWING: 0
SHORTNESS OF BREATH: 0
CHEST TIGHTNESS: 0
BLOOD IN STOOL: 0
COLOR CHANGE: 0
NAUSEA: 0

## 2021-05-07 ENCOUNTER — OFFICE VISIT (OUTPATIENT)
Dept: FAMILY MEDICINE CLINIC | Age: 86
End: 2021-05-07
Payer: MEDICARE

## 2021-05-07 VITALS
HEART RATE: 87 BPM | SYSTOLIC BLOOD PRESSURE: 138 MMHG | DIASTOLIC BLOOD PRESSURE: 88 MMHG | WEIGHT: 204 LBS | OXYGEN SATURATION: 98 % | TEMPERATURE: 97 F | RESPIRATION RATE: 18 BRPM | BODY MASS INDEX: 33.95 KG/M2

## 2021-05-07 DIAGNOSIS — M15.9 PRIMARY OSTEOARTHRITIS INVOLVING MULTIPLE JOINTS: Primary | ICD-10-CM

## 2021-05-07 DIAGNOSIS — R53.82 CHRONIC FATIGUE: ICD-10-CM

## 2021-05-07 DIAGNOSIS — M25.552 BILATERAL HIP PAIN: ICD-10-CM

## 2021-05-07 DIAGNOSIS — M25.551 BILATERAL HIP PAIN: ICD-10-CM

## 2021-05-07 DIAGNOSIS — G89.29 CHRONIC PAIN OF BOTH KNEES: ICD-10-CM

## 2021-05-07 DIAGNOSIS — Z91.81 AT HIGH RISK FOR INJURY RELATED TO FALL: ICD-10-CM

## 2021-05-07 DIAGNOSIS — M25.561 CHRONIC PAIN OF BOTH KNEES: ICD-10-CM

## 2021-05-07 DIAGNOSIS — Z91.81 AT HIGH RISK FOR FALLS: ICD-10-CM

## 2021-05-07 DIAGNOSIS — M25.562 CHRONIC PAIN OF BOTH KNEES: ICD-10-CM

## 2021-05-07 PROCEDURE — 99214 OFFICE O/P EST MOD 30 MIN: CPT | Performed by: PHYSICIAN ASSISTANT

## 2021-05-07 PROCEDURE — 1123F ACP DISCUSS/DSCN MKR DOCD: CPT | Performed by: PHYSICIAN ASSISTANT

## 2021-05-07 PROCEDURE — G8417 CALC BMI ABV UP PARAM F/U: HCPCS | Performed by: PHYSICIAN ASSISTANT

## 2021-05-07 PROCEDURE — G8427 DOCREV CUR MEDS BY ELIG CLIN: HCPCS | Performed by: PHYSICIAN ASSISTANT

## 2021-05-07 PROCEDURE — 1036F TOBACCO NON-USER: CPT | Performed by: PHYSICIAN ASSISTANT

## 2021-05-07 PROCEDURE — 4040F PNEUMOC VAC/ADMIN/RCVD: CPT | Performed by: PHYSICIAN ASSISTANT

## 2021-05-07 PROCEDURE — 1090F PRES/ABSN URINE INCON ASSESS: CPT | Performed by: PHYSICIAN ASSISTANT

## 2021-05-07 RX ORDER — METHYLPREDNISOLONE 4 MG/1
TABLET ORAL
Qty: 1 KIT | Refills: 0 | Status: SHIPPED | OUTPATIENT
Start: 2021-05-07 | End: 2021-05-13

## 2021-05-07 ASSESSMENT — ENCOUNTER SYMPTOMS
COUGH: 0
ABDOMINAL DISTENTION: 0
WHEEZING: 0
NAUSEA: 0
SHORTNESS OF BREATH: 0
TROUBLE SWALLOWING: 0
ABDOMINAL PAIN: 0
CHEST TIGHTNESS: 0
CONSTIPATION: 0
DIARRHEA: 0
COLOR CHANGE: 0
BLOOD IN STOOL: 0

## 2021-05-07 NOTE — PATIENT INSTRUCTIONS
Dr. Yasir Dela Cruz: [facility] office  Thursday/Friday--[place] (by Rai Pi). Supplement to try:   2-4 oz TART cherry juice/daily.   Glucosamine chondroitin for joints

## 2021-05-07 NOTE — PROGRESS NOTES
Subjective  Andie Flattery, 80 y.o. female presents today with:  Chief Complaint   Patient presents with    Fatigue     6 week follow up patient states she is feeling fatigue more often      HPI  Kim Sterling is in the office today for 6 week follow up. Last OV with me: 3/26/2021. Fatigue. At last OV, C/o increased fatigue/daytime tiredness. History of hypoxia in which she wears 3L of oxygen continuously.    Encouraged to wear oxygen at night, has been doing so, which has led to improvement in her fatigue. Feeling better. Denies CP, dizziness, worsening SOB.        Chronic joint pain. Reports worsening bilateral knee and hip pain. History of arthritis with L TKA x 2.    Stiffness and discomfort have progressed to the point that she has difficulty with ambulating. Pain flexing and abducting at L hip joint to get in/out of cars. Ambulating with rollator. Had several falls last year due to limited joint mobility and pain. At this time, she is taking tylenol which provides little relief. Started on voltaren 50 mg BID at last OV. Has noted some improvement at times. Cold/damp weather makes symptoms worse. Ortho referral placed, patient would like to discuss location with her daughter and will plan on scheduling. Lab results.   Results for orders placed or performed in visit on 03/26/21   CBC Auto Differential   Result Value Ref Range    WBC 6.7 4.8 - 10.8 K/uL    RBC 4.79 4.20 - 5.40 M/uL    Hemoglobin 14.5 12.0 - 16.0 g/dL    Hematocrit 43.7 37.0 - 47.0 %    MCV 91.2 82.0 - 100.0 fL    MCH 30.3 27.0 - 31.3 pg    MCHC 33.2 33.0 - 37.0 %    RDW 14.2 11.5 - 14.5 %    Platelets 460 357 - 437 K/uL    Neutrophils % 75.3 %    Lymphocytes % 11.9 %    Monocytes % 10.1 %    Eosinophils % 2.1 %    Basophils % 0.6 %    Neutrophils Absolute 5.0 1.4 - 6.5 K/uL    Lymphocytes Absolute 0.8 (L) 1.0 - 4.8 K/uL    Monocytes Absolute 0.7 0.2 - 0.8 K/uL    Eosinophils Absolute 0.1 0.0 - 0.7 K/uL    Basophils Absolute 0.0 0.0 - 0.2 K/uL   Comprehensive Metabolic Panel   Result Value Ref Range    Sodium 139 135 - 144 mEq/L    Potassium 4.1 3.4 - 4.9 mEq/L    Chloride 98 95 - 107 mEq/L    CO2 29 20 - 31 mEq/L    Anion Gap 12 9 - 15 mEq/L    Glucose 71 70 - 99 mg/dL    BUN 17 8 - 23 mg/dL    CREATININE 0.55 0.50 - 0.90 mg/dL    GFR Non-African American >60.0 >60    GFR  >60.0 >60    Calcium 10.1 (H) 8.5 - 9.9 mg/dL    Total Protein 8.1 (H) 6.3 - 8.0 g/dL    Albumin 4.4 3.5 - 4.6 g/dL    Total Bilirubin 0.5 0.2 - 0.7 mg/dL    Alkaline Phosphatase 87 40 - 130 U/L    ALT 13 0 - 33 U/L    AST 15 0 - 35 U/L    Globulin 3.7 (H) 2.3 - 3.5 g/dL   TSH Without Reflex   Result Value Ref Range    TSH 2.700 0.440 - 3.860 uIU/mL   Vitamin D 25 Hydroxy   Result Value Ref Range    Vit D, 25-Hydroxy 32.8 30.0 - 100.0 ng/mL   Lipid Panel   Result Value Ref Range    Cholesterol, Total 136 0 - 199 mg/dL    Triglycerides 112 0 - 150 mg/dL    HDL 49 40 - 59 mg/dL    LDL Calculated 65 0 - 129 mg/dL   Vitamin B12 & Folate   Result Value Ref Range    Vitamin B-12 >2000 (H) 232 - 1245 pg/mL    Folate >20.0 7.3 - 26.1 ng/mL       Review of Systems   Constitutional: Positive for fatigue. Negative for activity change, appetite change, chills, diaphoresis and fever. HENT: Negative for congestion and trouble swallowing. Respiratory: Negative for cough, chest tightness, shortness of breath and wheezing. Cardiovascular: Negative for chest pain, palpitations and leg swelling. Gastrointestinal: Negative for abdominal distention, abdominal pain, blood in stool, constipation, diarrhea and nausea. Genitourinary: Negative for dysuria and frequency. Musculoskeletal: Positive for arthralgias, gait problem (ambulates with walker) and joint swelling. Negative for myalgias and neck pain. Skin: Negative for color change. Neurological: Positive for weakness (BLE).  Negative for dizziness, seizures, light-headedness, numbness and headaches. Psychiatric/Behavioral: Negative for dysphoric mood and sleep disturbance. Past Medical History:   Diagnosis Date    BPV (benign positional vertigo) 2019    CAD (coronary artery disease)     Dr Yossi Camacho COPD (chronic obstructive pulmonary disease) ([facility] [address] 2012, VQ scan and CXR    has supplemental oxygen, uses it for 4-6 hours nightly, no inhalers use    Diverticulosis     Hiatal hernia     Hiatal hernia with gastroesophageal reflux 2015    EGD Dr Traylor Ar, [facility]    History of blood transfusion     History of colonoscopy with polypectomy 2017    Dr Erica Chapa History of sleep apnea 2011    no CPAP use    Hypertension     Hypothyroidism     Nocturnal hypoxia 2011    nocturnal O2, 3 LNC, 4-6 hours nightly    Obesity (BMI 30-39. 9)     Osteoarthritis of multiple joints     Oxygen dependent 2019    Postnasal drip     Sigmoid diverticulosis     Weakness of both legs      Past Surgical History:   Procedure Laterality Date    HYSTERECTOMY      fibroid, bleeding    JOINT REPLACEMENT      WI COLON CA SCRN NOT HI RSK IND N/A 9/1/2017    COLONOSCOPY performed by Bria Del Angel MD at [address] [facility] ESOPHAGOGASTRODUODENOSCOPY TRANSORAL DIAGNOSTIC N/A 9/1/2017    EGD ESOPHAGOGASTRODUODENOSCOPY performed by Bria Del Angel MD at [address], [address] Right 04/03/2012    TOTAL KNEE ARTHROPLASTY Bilateral     left x 2, right x1 (Dr Gabe Callejas)   [address]  12/29/15    Dr. Robles Brown Marital status:       Spouse name: Not on file    Number of children: 9    Years of education: Not on file    Highest education level: Not on file   Occupational History    Occupation: homemaker and , retired   Social Needs    Financial resource strain: Not hard at all   Juan-Blaise insecurity     Worry: Not on file     Inability: Not on file   the Shelf needs     Medical: Not on file Non-medical: Not on file   Tobacco Use    Smoking status: Former Smoker    Smokeless tobacco: Never Used    Tobacco comment: quit 50 years ago   Substance and Sexual Activity    Alcohol use: No     Alcohol/week: 0.0 standard drinks    Drug use: No    Sexual activity: Never   Lifestyle    Physical activity     Days per week: Not on file     Minutes per session: Not on file    Stress: Not on file   Relationships    Social connections     Talks on phone: Not on file     Gets together: Not on file     Attends Jain service: Not on file     Active member of club or organization: Not on file     Attends meetings of clubs or organizations: Not on file     Relationship status: Not on file    Intimate partner violence     Fear of current or ex partner: Not on file     Emotionally abused: Not on file     Physically abused: Not on file     Forced sexual activity: Not on file   Other Topics Concern    Not on file   Social History Narrative    Born in [place] (grandparents came from [place], settled in [place])    ,  (X 2)    Lives in a house with two of her sons    7 children, 3 M, 1 F    Lost 63 yo son in Feb 2018 from lung cancer    Was  for [employer]     Family History   Problem Relation Age of Onset    Diabetes Maternal Aunt     Osteoarthritis Father     Osteoarthritis Sister     Osteoarthritis Brother     Hypertension Mother     Heart Failure Mother         dec age 71, rheumatic     Allergies   Allergen Reactions    Ciprofloxacin Hcl     Lipitor [Atorvastatin]     Zetia [Ezetimibe]      Current Outpatient Medications   Medication Sig Dispense Refill    methylPREDNISolone (MEDROL DOSEPACK) 4 MG tablet Take by mouth. 1 kit 0    fluticasone (FLONASE) 50 MCG/ACT nasal spray INHALE 2 sprays Nasally daily.  16 g 2    sertraline (ZOLOFT) 50 MG tablet Take 1 tablet by mouth daily 90 tablet 1    amLODIPine (NORVASC) 2.5 MG tablet TAKE 1 TABLET BY MOUTH DAILY (before lunch) 90 tablet 1    diclofenac sodium (VOLTAREN) 1 % GEL Apply 4 grams topically 4 times daily as needed for Pain (arthritis/joint pain) 200 g 2    rosuvastatin (CRESTOR) 10 MG tablet TAKE 1 TABLET BY MOUTH EVERY DAY 90 tablet 3    pantoprazole (PROTONIX) 40 MG tablet Take 1 tablet by mouth daily as needed (heartburn) 90 tablet 3    valsartan (DIOVAN) 160 MG tablet Take 2 tablets by mouth nightly 180 tablet 3    levothyroxine (SYNTHROID) 50 MCG tablet Take 1 tablet by mouth every morning (before breakfast) 90 tablet 3    polyethylene glycol (GLYCOLAX) 17 GM/SCOOP powder Take 17 g by mouth daily as needed (constipation)      nitroGLYCERIN (NITROSTAT) 0.4 MG SL tablet Place 1 tablet under the tongue every 5 minutes as needed for Chest pain 25 tablet 6    aspirin 81 MG tablet Take 1 tablet by mouth daily 90 tablet 3    magnesium oxide (MAG-OX) 140 MG CAPS Take 140 mg by mouth daily      Misc. Devices MISC Portable Oxygen concentrator 1 Device 0    acetaminophen (TYLENOL) 500 MG tablet Take 500 mg by mouth every 6 hours as needed for Pain (1- 2 tabs in am) Indications: for pain      vitamin B-12 (CYANOCOBALAMIN) 1000 MCG tablet Take 1,000 mcg by mouth daily Indications: in am.      Cholecalciferol (VITAMIN D3) 2000 units CAPS Take 2,000 Units by mouth Daily with supper       Multiple Vitamins-Minerals (MULTIVITAMIN PO) Take 1 tablet by mouth daily       OXYGEN Inhale 3 L into the lungs continuous Uses prn at bedtime      diclofenac (VOLTAREN) 50 MG EC tablet Take 1 tablet by mouth 2 times daily (with meals) Take as needed for joint pain. 60 tablet 1     No current facility-administered medications for this visit. PMH, Surgical Hx, Family Hx, and Social Hx reviewed and updated. Health Maintenance reviewed.     Objective  Vitals:    05/07/21 0849   BP: 138/88   Pulse: 87   Resp: 18   Temp: 97 °F (36.1 °C)   TempSrc: Temporal   SpO2: 98%   Weight: 204 lb (92.5 kg) BP Readings from Last 3 Encounters:   05/07/21 138/88   03/26/21 112/74   10/12/20 120/80     Wt Readings from Last 3 Encounters:   05/07/21 204 lb (92.5 kg)   03/26/21 203 lb (92.1 kg)   10/12/20 199 lb (90.3 kg)     Physical Exam  Vitals signs reviewed. Constitutional:       General: She is not in acute distress. Appearance: Normal appearance. She is not ill-appearing. HENT:      Head: Normocephalic and atraumatic. Right Ear: External ear normal.      Left Ear: External ear normal.      Nose: Nose normal.   Cardiovascular:      Rate and Rhythm: Normal rate and regular rhythm. Pulmonary:      Effort: Pulmonary effort is normal.      Breath sounds: Normal breath sounds. Musculoskeletal:         General: Tenderness present. No swelling. Right hip: She exhibits decreased range of motion and decreased strength. Left hip: She exhibits decreased range of motion and decreased strength. Right knee: She exhibits decreased range of motion. Tenderness found. Left knee: She exhibits decreased range of motion. Tenderness found. Right lower leg: Edema (pedal) present. Left lower leg: Edema (pedal) present. Skin:     General: Skin is warm. Neurological:      General: No focal deficit present. Mental Status: She is alert and oriented to person, place, and time. Psychiatric:         Mood and Affect: Mood normal.         Behavior: Behavior normal.         Thought Content: Thought content normal.         Judgment: Judgment normal.       Assessment & Plan   Satish Kelly was seen today for fatigue. Diagnoses and all orders for this visit:    Primary osteoarthritis involving multiple joints  -     methylPREDNISolone (MEDROL DOSEPACK) 4 MG tablet; Take by mouth. Chronic pain of both knees  -     methylPREDNISolone (MEDROL DOSEPACK) 4 MG tablet; Take by mouth. Bilateral hip pain  -     methylPREDNISolone (MEDROL DOSEPACK) 4 MG tablet; Take by mouth.     At high risk for injury related to fall    At high risk for falls    Chronic fatigue    Reviewed labs--all stable. Continue current medications. Ready to see ortho, plans on scheduling. Medrol dosepack for increased joint pain/inflammation. Aware to HOLD voltaren while taking. 6 week follow up with me. Orders Placed This Encounter   Medications    methylPREDNISolone (MEDROL DOSEPACK) 4 MG tablet     Sig: Take by mouth. Dispense:  1 kit     Refill:  0     There are no discontinued medications. Return in about 6 weeks (around 6/18/2021) for follow up in office. Reviewed with the patient: current clinical status, medications, activities and diet. Side effects, adverse effects of the medication prescribed today, as well as treatment plan/ rationale and result expectations have been discussed with the patient who expresses understanding and desires to proceed. Close follow up to evaluate treatment results and for coordination of care. I have reviewed the patient's medical history in detail and updated the computerized patient record.     Tova Mazariegos PA-C

## 2021-09-26 ENCOUNTER — APPOINTMENT (OUTPATIENT)
Dept: GENERAL RADIOLOGY | Age: 86
DRG: 558 | End: 2021-09-26
Payer: MEDICARE

## 2021-09-26 ENCOUNTER — HOSPITAL ENCOUNTER (INPATIENT)
Age: 86
LOS: 3 days | Discharge: NURSING, REHAB OR LONG-TERM CARE | DRG: 558 | End: 2021-09-29
Attending: STUDENT IN AN ORGANIZED HEALTH CARE EDUCATION/TRAINING PROGRAM | Admitting: FAMILY MEDICINE
Payer: MEDICARE

## 2021-09-26 DIAGNOSIS — M71.10 SEPTIC BURSITIS: Primary | ICD-10-CM

## 2021-09-26 PROBLEM — M71.121 SEPTIC BURSITIS OF ELBOW, RIGHT: Status: ACTIVE | Noted: 2021-09-26

## 2021-09-26 LAB
ALBUMIN SERPL-MCNC: 4.2 G/DL (ref 3.5–4.6)
ALP BLD-CCNC: 99 U/L (ref 40–130)
ALT SERPL-CCNC: 15 U/L (ref 0–33)
ANION GAP SERPL CALCULATED.3IONS-SCNC: 14 MEQ/L (ref 9–15)
AST SERPL-CCNC: 18 U/L (ref 0–35)
BASOPHILS ABSOLUTE: 0 K/UL (ref 0–0.2)
BASOPHILS RELATIVE PERCENT: 0.3 %
BILIRUB SERPL-MCNC: 0.5 MG/DL (ref 0.2–0.7)
BUN BLDV-MCNC: 16 MG/DL (ref 8–23)
C-REACTIVE PROTEIN, HIGH SENSITIVITY: 47.8 MG/L (ref 0–5)
CALCIUM SERPL-MCNC: 9.2 MG/DL (ref 8.5–9.9)
CHLORIDE BLD-SCNC: 98 MEQ/L (ref 95–107)
CO2: 26 MEQ/L (ref 20–31)
CREAT SERPL-MCNC: 0.6 MG/DL (ref 0.5–0.9)
EOSINOPHILS ABSOLUTE: 0 K/UL (ref 0–0.7)
EOSINOPHILS RELATIVE PERCENT: 0.1 %
GFR AFRICAN AMERICAN: >60
GFR NON-AFRICAN AMERICAN: >60
GLOBULIN: 2.9 G/DL (ref 2.3–3.5)
GLUCOSE BLD-MCNC: 135 MG/DL (ref 70–99)
HCT VFR BLD CALC: 45.2 % (ref 37–47)
HEMOGLOBIN: 15 G/DL (ref 12–16)
LACTIC ACID: 2.4 MMOL/L (ref 0.5–2.2)
LYMPHOCYTES ABSOLUTE: 0.3 K/UL (ref 1–4.8)
LYMPHOCYTES RELATIVE PERCENT: 2.4 %
MCH RBC QN AUTO: 29.3 PG (ref 27–31.3)
MCHC RBC AUTO-ENTMCNC: 33.1 % (ref 33–37)
MCV RBC AUTO: 88.6 FL (ref 82–100)
MONOCYTES ABSOLUTE: 0.8 K/UL (ref 0.2–0.8)
MONOCYTES RELATIVE PERCENT: 5.3 %
NEUTROPHILS ABSOLUTE: 13 K/UL (ref 1.4–6.5)
NEUTROPHILS RELATIVE PERCENT: 91.9 %
PDW BLD-RTO: 14.3 % (ref 11.5–14.5)
PLATELET # BLD: 221 K/UL (ref 130–400)
POTASSIUM SERPL-SCNC: 4.1 MEQ/L (ref 3.4–4.9)
RBC # BLD: 5.1 M/UL (ref 4.2–5.4)
SARS-COV-2, NAAT: NOT DETECTED
SEDIMENTATION RATE, ERYTHROCYTE: 16 MM (ref 0–30)
SODIUM BLD-SCNC: 138 MEQ/L (ref 135–144)
TOTAL PROTEIN: 7.1 G/DL (ref 6.3–8)
URIC ACID, SERUM: 5.7 MG/DL (ref 2.4–5.7)
WBC # BLD: 14.2 K/UL (ref 4.8–10.8)

## 2021-09-26 PROCEDURE — 0R9L3ZX DRAINAGE OF RIGHT ELBOW JOINT, PERCUTANEOUS APPROACH, DIAGNOSTIC: ICD-10-PCS | Performed by: STUDENT IN AN ORGANIZED HEALTH CARE EDUCATION/TRAINING PROGRAM

## 2021-09-26 PROCEDURE — 10060 I&D ABSCESS SIMPLE/SINGLE: CPT

## 2021-09-26 PROCEDURE — 96375 TX/PRO/DX INJ NEW DRUG ADDON: CPT

## 2021-09-26 PROCEDURE — 99285 EMERGENCY DEPT VISIT HI MDM: CPT

## 2021-09-26 PROCEDURE — 6360000002 HC RX W HCPCS: Performed by: FAMILY MEDICINE

## 2021-09-26 PROCEDURE — 80053 COMPREHEN METABOLIC PANEL: CPT

## 2021-09-26 PROCEDURE — 1210000000 HC MED SURG R&B

## 2021-09-26 PROCEDURE — 87147 CULTURE TYPE IMMUNOLOGIC: CPT

## 2021-09-26 PROCEDURE — 2580000003 HC RX 258: Performed by: PHYSICIAN ASSISTANT

## 2021-09-26 PROCEDURE — 84550 ASSAY OF BLOOD/URIC ACID: CPT

## 2021-09-26 PROCEDURE — 87186 SC STD MICRODIL/AGAR DIL: CPT

## 2021-09-26 PROCEDURE — 83605 ASSAY OF LACTIC ACID: CPT

## 2021-09-26 PROCEDURE — 87635 SARS-COV-2 COVID-19 AMP PRB: CPT

## 2021-09-26 PROCEDURE — 87040 BLOOD CULTURE FOR BACTERIA: CPT

## 2021-09-26 PROCEDURE — 85025 COMPLETE CBC W/AUTO DIFF WBC: CPT

## 2021-09-26 PROCEDURE — 87070 CULTURE OTHR SPECIMN AEROBIC: CPT

## 2021-09-26 PROCEDURE — 2580000003 HC RX 258: Performed by: FAMILY MEDICINE

## 2021-09-26 PROCEDURE — 6360000002 HC RX W HCPCS: Performed by: PHYSICIAN ASSISTANT

## 2021-09-26 PROCEDURE — 36415 COLL VENOUS BLD VENIPUNCTURE: CPT

## 2021-09-26 PROCEDURE — 93005 ELECTROCARDIOGRAM TRACING: CPT

## 2021-09-26 PROCEDURE — 73080 X-RAY EXAM OF ELBOW: CPT

## 2021-09-26 PROCEDURE — 85652 RBC SED RATE AUTOMATED: CPT

## 2021-09-26 PROCEDURE — 96374 THER/PROPH/DIAG INJ IV PUSH: CPT

## 2021-09-26 PROCEDURE — 87205 SMEAR GRAM STAIN: CPT

## 2021-09-26 PROCEDURE — 86141 C-REACTIVE PROTEIN HS: CPT

## 2021-09-26 PROCEDURE — 6370000000 HC RX 637 (ALT 250 FOR IP): Performed by: FAMILY MEDICINE

## 2021-09-26 PROCEDURE — 87077 CULTURE AEROBIC IDENTIFY: CPT

## 2021-09-26 PROCEDURE — 87075 CULTR BACTERIA EXCEPT BLOOD: CPT

## 2021-09-26 PROCEDURE — 6370000000 HC RX 637 (ALT 250 FOR IP): Performed by: PHYSICIAN ASSISTANT

## 2021-09-26 RX ORDER — ACETAMINOPHEN 325 MG/1
650 TABLET ORAL EVERY 6 HOURS PRN
Status: DISCONTINUED | OUTPATIENT
Start: 2021-09-26 | End: 2021-09-29 | Stop reason: HOSPADM

## 2021-09-26 RX ORDER — ONDANSETRON 4 MG/1
4 TABLET, ORALLY DISINTEGRATING ORAL EVERY 8 HOURS PRN
Status: DISCONTINUED | OUTPATIENT
Start: 2021-09-26 | End: 2021-09-29 | Stop reason: HOSPADM

## 2021-09-26 RX ORDER — ONDANSETRON 2 MG/ML
4 INJECTION INTRAMUSCULAR; INTRAVENOUS EVERY 6 HOURS PRN
Status: DISCONTINUED | OUTPATIENT
Start: 2021-09-26 | End: 2021-09-29 | Stop reason: HOSPADM

## 2021-09-26 RX ORDER — KETOROLAC TROMETHAMINE 15 MG/ML
15 INJECTION, SOLUTION INTRAMUSCULAR; INTRAVENOUS ONCE
Status: COMPLETED | OUTPATIENT
Start: 2021-09-26 | End: 2021-09-26

## 2021-09-26 RX ORDER — SODIUM CHLORIDE 0.9 % (FLUSH) 0.9 %
5-40 SYRINGE (ML) INJECTION PRN
Status: DISCONTINUED | OUTPATIENT
Start: 2021-09-26 | End: 2021-09-29 | Stop reason: HOSPADM

## 2021-09-26 RX ORDER — ONDANSETRON 2 MG/ML
4 INJECTION INTRAMUSCULAR; INTRAVENOUS ONCE
Status: COMPLETED | OUTPATIENT
Start: 2021-09-26 | End: 2021-09-26

## 2021-09-26 RX ORDER — SODIUM CHLORIDE 9 MG/ML
25 INJECTION, SOLUTION INTRAVENOUS PRN
Status: DISCONTINUED | OUTPATIENT
Start: 2021-09-26 | End: 2021-09-29 | Stop reason: HOSPADM

## 2021-09-26 RX ORDER — SODIUM CHLORIDE 0.9 % (FLUSH) 0.9 %
5-40 SYRINGE (ML) INJECTION EVERY 12 HOURS SCHEDULED
Status: DISCONTINUED | OUTPATIENT
Start: 2021-09-26 | End: 2021-09-29 | Stop reason: HOSPADM

## 2021-09-26 RX ORDER — ETHYL CHLORIDE 100 %
AEROSOL, SPRAY (ML) TOPICAL
Status: COMPLETED | OUTPATIENT
Start: 2021-09-26 | End: 2021-09-26

## 2021-09-26 RX ORDER — 0.9 % SODIUM CHLORIDE 0.9 %
1000 INTRAVENOUS SOLUTION INTRAVENOUS ONCE
Status: COMPLETED | OUTPATIENT
Start: 2021-09-26 | End: 2021-09-26

## 2021-09-26 RX ORDER — POLYETHYLENE GLYCOL 3350 17 G/17G
17 POWDER, FOR SOLUTION ORAL DAILY PRN
Status: DISCONTINUED | OUTPATIENT
Start: 2021-09-26 | End: 2021-09-29 | Stop reason: HOSPADM

## 2021-09-26 RX ORDER — ACETAMINOPHEN 650 MG/1
650 SUPPOSITORY RECTAL EVERY 6 HOURS PRN
Status: DISCONTINUED | OUTPATIENT
Start: 2021-09-26 | End: 2021-09-29 | Stop reason: HOSPADM

## 2021-09-26 RX ORDER — ACETAMINOPHEN 500 MG
1000 TABLET ORAL ONCE
Status: COMPLETED | OUTPATIENT
Start: 2021-09-26 | End: 2021-09-26

## 2021-09-26 RX ADMIN — SODIUM CHLORIDE 1000 ML: 9 INJECTION, SOLUTION INTRAVENOUS at 07:11

## 2021-09-26 RX ADMIN — ONDANSETRON 4 MG: 2 INJECTION INTRAMUSCULAR; INTRAVENOUS at 07:12

## 2021-09-26 RX ADMIN — KETOROLAC TROMETHAMINE 15 MG: 15 INJECTION, SOLUTION INTRAMUSCULAR; INTRAVENOUS at 07:12

## 2021-09-26 RX ADMIN — ENOXAPARIN SODIUM 40 MG: 40 INJECTION SUBCUTANEOUS at 11:10

## 2021-09-26 RX ADMIN — PIPERACILLIN AND TAZOBACTAM 3375 MG: 3; .375 INJECTION, POWDER, LYOPHILIZED, FOR SOLUTION INTRAVENOUS at 11:40

## 2021-09-26 RX ADMIN — SODIUM CHLORIDE, PRESERVATIVE FREE 10 ML: 5 INJECTION INTRAVENOUS at 20:01

## 2021-09-26 RX ADMIN — Medication: at 09:25

## 2021-09-26 RX ADMIN — PIPERACILLIN AND TAZOBACTAM 3375 MG: 3; .375 INJECTION, POWDER, LYOPHILIZED, FOR SOLUTION INTRAVENOUS at 20:00

## 2021-09-26 RX ADMIN — ACETAMINOPHEN 650 MG: 325 TABLET ORAL at 20:00

## 2021-09-26 RX ADMIN — VANCOMYCIN HYDROCHLORIDE 1250 MG: 5 INJECTION, POWDER, LYOPHILIZED, FOR SOLUTION INTRAVENOUS at 12:13

## 2021-09-26 RX ADMIN — ACETAMINOPHEN 1000 MG: 500 TABLET ORAL at 07:12

## 2021-09-26 ASSESSMENT — ENCOUNTER SYMPTOMS
NAUSEA: 1
BACK PAIN: 0
RHINORRHEA: 0
PHOTOPHOBIA: 0
VOMITING: 1
DIARRHEA: 0
EYE PAIN: 0
SHORTNESS OF BREATH: 0
ABDOMINAL PAIN: 0
COUGH: 0
SORE THROAT: 0

## 2021-09-26 ASSESSMENT — PAIN DESCRIPTION - LOCATION
LOCATION: ELBOW
LOCATION: ELBOW

## 2021-09-26 ASSESSMENT — PAIN SCALES - GENERAL
PAINLEVEL_OUTOF10: 5
PAINLEVEL_OUTOF10: 9
PAINLEVEL_OUTOF10: 9
PAINLEVEL_OUTOF10: 4

## 2021-09-26 ASSESSMENT — PAIN DESCRIPTION - PAIN TYPE
TYPE: ACUTE PAIN
TYPE: ACUTE PAIN

## 2021-09-26 ASSESSMENT — PAIN DESCRIPTION - DESCRIPTORS
DESCRIPTORS: ACHING
DESCRIPTORS: ACHING

## 2021-09-26 ASSESSMENT — PAIN DESCRIPTION - FREQUENCY
FREQUENCY: CONTINUOUS
FREQUENCY: CONTINUOUS

## 2021-09-26 ASSESSMENT — PAIN DESCRIPTION - ORIENTATION
ORIENTATION: RIGHT
ORIENTATION: RIGHT

## 2021-09-26 ASSESSMENT — PAIN DESCRIPTION - PROGRESSION: CLINICAL_PROGRESSION: GRADUALLY IMPROVING

## 2021-09-26 NOTE — ED NOTES
Pts purewick got displaced. Bed linens changed. Gown changed. Pt cleaned up. Blankets applied. Aware of bed assignment. All tele removed and none ordered for floor. Pts belongings placed on her bed for transport.      Daphne Steen, YURY  09/26/21 7817

## 2021-09-26 NOTE — ED NOTES
This RN and Amalia Collazo RN at bedside to assist with aspiration of right elbow. Pt tolerated well. Ethyl chloride applied prior to aspiration. Sterile gauze applied to puncture site and pressure bandage placed.       Pawel Kelly RN  09/26/21 [id number]

## 2021-09-26 NOTE — H&P
medications    Medication Sig Start Date End Date Taking? Authorizing Provider   sertraline (ZOLOFT) 50 MG tablet Take 1 tablet by mouth every evening 8/10/21   Tova Mazariegos PA-C   diclofenac sodium (VOLTAREN) 1 % GEL Apply 4 grams topically 4 times daily as needed for Pain (arthritis/joint pain) 8/10/21   Tova Mazariegos PA-C   fluticasone (FLONASE) 50 MCG/ACT nasal spray INHALE 2 sprays into the nose daily. 8/10/21   Tova Mazariegos PA-C   amLODIPine (NORVASC) 2.5 MG tablet TAKE 1 TABLET BY MOUTH DAILY (before lunch) 5/11/21   Tova Mazariegos PA-C   valsartan (DIOVAN) 160 MG tablet Take 2 tablets by mouth nightly 5/11/21   Tova Mazariegos PA-C   pantoprazole (PROTONIX) 40 MG tablet Take 1 tablet by mouth daily as needed (heartburn) 5/11/21   Tova Mazariegos PA-C   diclofenac (VOLTAREN) 50 MG EC tablet TAKE 1 TABLET BY MOUTH TWICE DAILY WITH MEALS. TAKE AS NEEDED FOR JOINT PAIN 5/11/21   Tova Mazariegos PA-C   rosuvastatin (CRESTOR) 10 MG tablet TAKE 1 TABLET BY MOUTH EVERY DAY 5/11/21   Tova Mazariegos PA-C   levothyroxine (SYNTHROID) 50 MCG tablet Take 1 tablet by mouth every morning (before breakfast) 5/11/21   Tova Mazariegos PA-C   polyethylene glycol (GLYCOLAX) 17 GM/SCOOP powder Take 17 g by mouth daily as needed (constipation)    Historical Provider, MD   nitroGLYCERIN (NITROSTAT) 0.4 MG SL tablet Place 1 tablet under the tongue every 5 minutes as needed for Chest pain 6/22/20   Tova Mazariegos PA-C   aspirin 81 MG tablet Take 1 tablet by mouth daily 2/11/20   Jean Almaraz MD   magnesium oxide (MAG-OX) 140 MG CAPS Take 140 mg by mouth daily 1/22/07   Historical Provider, MD   Misc.  Devices MISC Portable Oxygen concentrator 5/24/19   Herman House MD   acetaminophen (TYLENOL) 500 MG tablet Take 500 mg by mouth every 6 hours as needed for Pain (1- 2 tabs in am) Indications: for pain    Historical Provider, MD   vitamin B-12 (CYANOCOBALAMIN) 1000 MCG tablet Take 1,000 mcg by mouth daily Indications: in am.    Historical Provider, MD   Cholecalciferol (VITAMIN D3) 2000 units CAPS Take 2,000 Units by mouth Daily with supper     Historical Provider, MD   Multiple Vitamins-Minerals (MULTIVITAMIN PO) Take 1 tablet by mouth daily     Historical Provider, MD   OXYGEN Inhale 3 L into the lungs continuous Uses prn at bedtime    Historical Provider, MD       Allergies:  Ciprofloxacin hcl, Lipitor [atorvastatin], and Zetia [ezetimibe]    Social History:   TOBACCO:   reports that she has quit smoking. She has never used smokeless tobacco.  ETOH:   reports no history of alcohol use. Family History:       Problem Relation Age of Onset    Diabetes Maternal Aunt     Osteoarthritis Father     Osteoarthritis Sister     Osteoarthritis Brother     Hypertension Mother     Heart Failure Mother         dec age 71, rheumatic       REVIEW OF SYSTEMS:  Ten systems reviewed and negative except for as above. Physical Exam:    Vitals: /63   Pulse 76   Temp 99.2 °F (37.3 °C) (Oral)   Resp 18   Ht 5' 6\" (1.676 m)   Wt 199 lb (90.3 kg)   SpO2 97%   BMI 32.12 kg/m²   Constitutional: alert, appears stated age and cooperative  Skin: Right elbow with bursa swelling and ttp, Erythema  Eyes:Eye: Normal external eye, conjunctiva, NEELA. ENT: Head: Normocephalic, no lesions, without obvious abnormality. Neck: no adenopathy, no carotid bruit, no JVD, supple, symmetrical, trachea midline and thyroid not enlarged, symmetric, no tenderness/mass/nodules  Respiratory: clear to auscultation bilaterally  Cardiovascular: regular rate and rhythm, S1, S2 normal, no murmur, click, rub or gallop  Gastrointestinal: soft, non-tender; bowel sounds normal; no masses,  no organomegaly  Genitourinary: Deferred  Musculoskeletal:extremities normal, atraumatic, no cyanosis or edema  Neurologic: Mental status AAOx3 No facial asymmetry or droop. Normal muscle strength b/l. CN II-XII grossly intact. Psychiatric: Appropriate mood and affect.  Good insight and hip pain M25.551, M25.552    Chronic pain of both knees M25.561, M25.562, G89.29    Septic bursitis of elbow, right M71.121       Cristela Burrows MD, MD  Admitting Hospitalist

## 2021-09-26 NOTE — ED NOTES
Wound culture obtained. YUE Mcconnell in right after. Wrapped right elbow back up with ace wrap all the way to the wrist per v.o. of [facility]. Also to hold all ABX's until she speaks to the hospitalist. John Hernandez right arm at 90 degrees in sling as well per V.O. YUE Mcconnell.      Willy Amor RN  09/26/21 7685

## 2021-09-26 NOTE — ED TRIAGE NOTES
Pt arrived to ED via ems from home. Pt states she had glass of water and vomited right after. Pt states she also had elbow pain and noticed the swelling in her right elbow increases. Pt rates pain 9/10. Pt states she has generalized pain from arthritis through her whole body. Pt is alert and oriented x 4. Pt arrived on 3lnc. And a 20g left ac. Pt denies chest  Pain and sob.

## 2021-09-26 NOTE — FLOWSHEET NOTE
9/26/21 @ 99 [id number] Notified Infectious Disease answering service of consult # [phone]    9/26/21 @ 46 Notified Dr. Lázaro David of Ortho consult via [facility]; @ 5136 cancelled consult to Dr. Lázaro David via [employer]    9/26.21 Dr. Carolyn Trent is aware of Inpatient consult # [phone]

## 2021-09-26 NOTE — ED NOTES
Pt brought to room 17 to await admission bed. Assumed care of patient. A & Ox4. Skin pink warm and dry. Pt denies any complaints at this time except hunger. Will see if patient can have clear liquids. Call light within reach.      Elli Gomez RN  09/26/21 9399

## 2021-09-26 NOTE — ED NOTES
Per YUE Mcconnell, she just spoke with hospitalist. To go ahead and give the ABX's.       Enrique Auguste RN  09/26/21 9949

## 2021-09-26 NOTE — ED PROVIDER NOTES
[address] [place] [address] Yoseph Adair [address]  [facility]      Pt Name: Nellie Dill  MRN: [id number]  Theogfjoseph 6/19/1933  Date ofevaluation: 9/26/2021  Provider: Norman Dumas PA-C    CHIEF COMPLAINT       Chief Complaint   Patient presents with    Emesis    Other     R elbow pain          HISTORY OF PRESENT ILLNESS   (Location/Symptom, Timing/Onset,Context/Setting, Quality, Duration, Modifying Factors, Severity)  Note limiting factors. Nellie Dill is a 80 y.o. female who presents to the emergency department right elbow pain and swelling that has worsened over the last few days. She has pain with rom but can fully extend and flex. She reports that she rests her elbow on a lot of things to help balance. She has had nausea, vomiting, chills and fevers since yesterday. HPI    NursingNotes were reviewed. REVIEW OF SYSTEMS    (2-9 systems for level 4, 10 or more for level 5)     Review of Systems   Constitutional: Positive for chills and fever. Negative for diaphoresis and fatigue. HENT: Negative for congestion, rhinorrhea and sore throat. Eyes: Negative for photophobia and pain. Respiratory: Negative for cough and shortness of breath. Cardiovascular: Negative for chest pain and palpitations. Gastrointestinal: Positive for nausea and vomiting. Negative for abdominal pain and diarrhea. Genitourinary: Negative for dysuria and flank pain. Musculoskeletal: Positive for arthralgias and joint swelling. Negative for back pain. Skin: Negative for rash. Neurological: Negative for dizziness, light-headedness and headaches. Psychiatric/Behavioral: Negative. All other systems reviewed and are negative. Except as noted above the remainder of the review of systems was reviewed and negative.        PAST MEDICAL HISTORY     Past Medical History:   Diagnosis Date    BPV (benign positional vertigo) 2019    CAD (coronary artery disease)     Dr Adelina Tomas COPD (chronic obstructive pulmonary disease) ([facility] [address] 2012, VQ scan and CXR    has supplemental oxygen, uses it for 4-6 hours nightly, no inhalers use    Diverticulosis     Hiatal hernia     Hiatal hernia with gastroesophageal reflux 2015    EGD Dr Krystyna Hamilton, [facility]    History of blood transfusion     History of colonoscopy with polypectomy 2017    Dr Choco Bravo History of sleep apnea 2011    no CPAP use    Hypertension     Hypothyroidism     Nocturnal hypoxia 2011    nocturnal O2, 3 LNC, 4-6 hours nightly    Obesity (BMI 30-39. 9)     Osteoarthritis of multiple joints     Oxygen dependent 2019    Postnasal drip     Sigmoid diverticulosis     Weakness of both legs          SURGICALHISTORY       Past Surgical History:   Procedure Laterality Date    HYSTERECTOMY      fibroid, bleeding    JOINT REPLACEMENT      [place] COLON CA SCRN NOT HI RSK IND N/A 9/1/2017    COLONOSCOPY performed by Melburn Buerger, MD at [facility] ESOPHAGOGASTRODUODENOSCOPY TRANSORAL DIAGNOSTIC N/A 9/1/2017    EGD ESOPHAGOGASTRODUODENOSCOPY performed by Melburn Buerger, MD at [address] [facility] [address], [address] Right 04/03/2012    TOTAL KNEE ARTHROPLASTY Bilateral     left x 2, right x1 (Dr Ollie Jung)   [address] [place] [address] Drive  12/29/15    Dr. Cass Velazquez       Current Discharge Medication List      CONTINUE these medications which have NOT CHANGED    Details   sertraline (ZOLOFT) 50 MG tablet Take 1 tablet by mouth every evening  Qty: 90 tablet, Refills: 3    Associated Diagnoses: Medication refill      fluticasone (FLONASE) 50 MCG/ACT nasal spray INHALE 2 sprays into the nose daily. Qty: 16 g, Refills: 3    Comments: This prescription was filled on 8/8/2021. Any refills authorized will be placed on file.   Associated Diagnoses: Sensation of fullness in right ear      amLODIPine (NORVASC) 2.5 MG tablet TAKE 1 TABLET BY MOUTH DAILY (before lunch)  Qty: 90 tablet, Refills: 1    Associated Diagnoses: Essential hypertension; Medication refill      valsartan (DIOVAN) 160 MG tablet Take 2 tablets by mouth nightly  Qty: 180 tablet, Refills: 1    Associated Diagnoses: Essential hypertension; Medication refill      pantoprazole (PROTONIX) 40 MG tablet Take 1 tablet by mouth daily as needed (heartburn)  Qty: 90 tablet, Refills: 1    Associated Diagnoses: Medication refill      diclofenac (VOLTAREN) 50 MG EC tablet TAKE 1 TABLET BY MOUTH TWICE DAILY WITH MEALS.  TAKE AS NEEDED FOR JOINT PAIN  Qty: 60 tablet, Refills: 5    Associated Diagnoses: Osteoarthritis of multiple joints, unspecified osteoarthritis type; Bilateral hip pain; Chronic pain of both knees      rosuvastatin (CRESTOR) 10 MG tablet TAKE 1 TABLET BY MOUTH EVERY DAY  Qty: 90 tablet, Refills: 1    Associated Diagnoses: Medication refill      levothyroxine (SYNTHROID) 50 MCG tablet Take 1 tablet by mouth every morning (before breakfast)  Qty: 90 tablet, Refills: 1    Associated Diagnoses: Medication refill      polyethylene glycol (GLYCOLAX) 17 GM/SCOOP powder Take 17 g by mouth daily as needed (constipation)      nitroGLYCERIN (NITROSTAT) 0.4 MG SL tablet Place 1 tablet under the tongue every 5 minutes as needed for Chest pain  Qty: 25 tablet, Refills: 6    Associated Diagnoses: Coronary artery disease involving native coronary artery of native heart without angina pectoris      aspirin 81 MG tablet Take 1 tablet by mouth daily  Qty: 90 tablet, Refills: 3      magnesium oxide (MAG-OX) 140 MG CAPS Take 140 mg by mouth daily      acetaminophen (TYLENOL) 500 MG tablet Take 500 mg by mouth every 6 hours as needed for Pain (1- 2 tabs in am) Indications: for pain      vitamin B-12 (CYANOCOBALAMIN) 1000 MCG tablet Take 1,000 mcg by mouth daily Indications: in am.      Cholecalciferol (VITAMIN D3) 2000 units CAPS Take 2,000 Units by mouth Daily with supper       Multiple Vitamins-Minerals (MULTIVITAMIN PO) Take 1 tablet by mouth daily       diclofenac sodium (VOLTAREN) 1 % GEL Apply 4 grams topically 4 times daily as needed for Pain (arthritis/joint pain)  Qty: 200 g, Refills: 2    Associated Diagnoses: Primary osteoarthritis involving multiple joints      Misc. Devices MISC Portable Oxygen concentrator  Qty: 1 Device, Refills: 0    Associated Diagnoses: Chronic obstructive pulmonary disease, unspecified COPD type (HCC)      OXYGEN Inhale 3 L into the lungs continuous Uses prn at bedtime             ALLERGIES     Ciprofloxacin hcl, Lipitor [atorvastatin], and Zetia [ezetimibe]    FAMILY HISTORY       Family History   Problem Relation Age of Onset    Diabetes Maternal Aunt     Osteoarthritis Father     Osteoarthritis Sister     Osteoarthritis Brother     Hypertension Mother     Heart Failure Mother         dec age 71, rheumatic          SOCIAL HISTORY       Social History     Socioeconomic History    Marital status:       Spouse name: None    Number of children: 7    Years of education: None    Highest education level: None   Occupational History    Occupation: homemaker and , retired   Tobacco Use    Smoking status: Former Smoker    Smokeless tobacco: Never Used    Tobacco comment: quit 50 years ago   Substance and Sexual Activity    Alcohol use: No     Alcohol/week: 0.0 standard drinks    Drug use: No    Sexual activity: Never   Other Topics Concern    None   Social History Narrative    Born in [place] [facility] (grandparents came from [facility], settled in [place] [facility])    ,  (X 2)    Lives in a house with two of her sons    7 children, 3 M, 1 F    Lost 63 yo son in Feb 2018 from lung cancer    Was  for the [employer], cooking, housework     Social Determinants of Health     Financial Resource Strain: Low Risk     Difficulty of Paying Living Expenses: Not hard at all   Food Insecurity:     Worried About [address] in the Last Year:     [address] in the Last Year:    Transportation Needs:     Lack Cervical: No cervical adenopathy. Skin:     General: Skin is warm and dry. Capillary Refill: Capillary refill takes less than 2 seconds. Findings: No rash. Neurological:      Mental Status: She is alert and oriented to person, place, and time. Psychiatric:         Thought Content: Thought content normal.         Judgment: Judgment normal.         DIAGNOSTIC RESULTS     EKG: All EKG's are interpreted by the Emergency Department Physician who either signs or Co-signsthis chart in the absence of a cardiologist.        RADIOLOGY:   Jeri Ohm such as CT, Ultrasound and MRI are read by the radiologist. Plain radiographic images are visualized and preliminarily interpreted by the emergency physician with the below findings:        Interpretation per the Radiologist below, if available at the time ofthis note:    XR ELBOW RIGHT (MIN 3 VIEWS)   Final Result      No acute osseous findings. Possible olecranon bursitis.             ED BEDSIDE ULTRASOUND:   Performed by ED Physician - none    LABS:  Labs Reviewed   COMPREHENSIVE METABOLIC PANEL - Abnormal; Notable for the following components:       Result Value    Glucose 135 (*)     All other components within normal limits   CBC WITH AUTO DIFFERENTIAL - Abnormal; Notable for the following components:    WBC 14.2 (*)     Neutrophils Absolute 13.0 (*)     Lymphocytes Absolute 0.3 (*)     All other components within normal limits   LACTIC ACID, PLASMA - Abnormal; Notable for the following components:    Lactic Acid 2.4 (*)     All other components within normal limits   HIGH SENSITIVITY CRP - Abnormal; Notable for the following components:    CRP High Sensitivity 47.8 (*)     All other components within normal limits   COVID-19, RAPID   CULTURE, ANAEROBIC AND AEROBIC    Narrative:     ORDER#: [id number]                          ORDERED BY: NORMA LOPEZ  SOURCE: Nabeel                              COLLECTED:  09/26/21 10:35  ANTIBIOTICS AT ALEN.: RECEIVED :  09/26/21 10:35   CULTURE, BLOOD 1   CULTURE, BLOOD 2   CULTURE, BODY FLUID    Narrative:     ORDER#: [id number]                          ORDERED BY: JANES PAIGE  SOURCE: Aspirate Body Fluid                COLLECTED:  09/26/21 09:34  ANTIBIOTICS AT ALEN.:                      RECEIVED :  09/26/21 09:34  Concentrate (centrifuge) body fluid add SCNFL to order   GRAM STAIN   CULTURE, ANAEROBIC   URIC ACID   SEDIMENTATION RATE       All other labs were within normal range or not returned as of this dictation. EMERGENCY DEPARTMENT COURSE and DIFFERENTIAL DIAGNOSIS/MDM:   Vitals:    Vitals:    09/26/21 1430 09/26/21 1500 09/26/21 1530 09/26/21 1600   BP: 103/60 102/63 122/65 115/60   Pulse: 79 76 88 83   Resp: 15 18 19 20   Temp:    99.5 °F (37.5 °C)   TempSrc:    Oral   SpO2: 95% 97% 94% 99%   Weight:       Height:               MDM  Patient is nontoxic no acute distress afebrile and hemodynamically stable. She was febrile on arrival and provided with Tylenol and Toradol for pain and antipyretic. She does have leukocytosis elevated lactic acid and a infected bursitis. This was aspirated while in the emergency department. Culture was sent. Spoke to Ortho on-call Dr. Lina Gregorio.  Aspirated in the emergency department hold off on antibiotics until culture is back I let the hospitalist know this. Will admit to medicine service Dr. Travis Alarcon accepted the patient    [facility]   Total Critical Care time was  minutes, excluding separatelyreportable procedures. There was a high probability ofclinically significant/life threatening deterioration in the patient's condition which required my urgent intervention. CONSULTS:  [address] [place] [address] CONSULT TO INFECTIOUS DISEASES    PROCEDURES:  Unless otherwise noted below, none     Incision/Drainage    Date/Time: 9/26/2021 11:09 AM  Performed by:  Sindhu May PA-C  Authorized by: Peng Gomez DO Consent:     Consent obtained:  Verbal    Consent given by:  Patient    Risks discussed:  Bleeding, incomplete drainage, pain and infection    Alternatives discussed:  No treatment and delayed treatment  Location:     Type:  Bursa    Location:  Upper extremity    Upper extremity location:  Elbow  Pre-procedure details:     Skin preparation:  Chloraprep  Anesthesia (see MAR for exact dosages): Anesthesia method:  None  Procedure type:     Complexity:  Simple  Procedure details:     Needle aspiration: yes      Needle size:  18 G    Drainage:  Purulent    Drainage amount:  Copious    Wound treatment:  Wound left open  Post-procedure details:     Patient tolerance of procedure: Tolerated well, no immediate complications  Comments:      Gauze placed over aspiration site with a Ace wrap elbow to hand. Patient placed in a sling. FINAL IMPRESSION      1. Septic bursitis          DISPOSITION/PLAN   DISPOSITION Admitted 09/26/2021 10:04:09 AM      PATIENT REFERREDTO:  No follow-up provider specified.     DISCHARGEMEDICATIONS:  Current Discharge Medication List             (Please note that portions of this note were completed with a voice recognition program.  Efforts were made to edit the dictations but occasionally words are mis-transcribed.)    Lawrence España PA-C (electronically signed)  Attending Emergency Physician        Lawrence España PA-C  09/26/21 [id number]

## 2021-09-27 LAB
ANION GAP SERPL CALCULATED.3IONS-SCNC: 8 MEQ/L (ref 9–15)
BUN BLDV-MCNC: 14 MG/DL (ref 8–23)
CALCIUM SERPL-MCNC: 8.7 MG/DL (ref 8.5–9.9)
CHLORIDE BLD-SCNC: 98 MEQ/L (ref 95–107)
CO2: 30 MEQ/L (ref 20–31)
CREAT SERPL-MCNC: 0.67 MG/DL (ref 0.5–0.9)
GFR AFRICAN AMERICAN: >60
GFR NON-AFRICAN AMERICAN: >60
GLUCOSE BLD-MCNC: 110 MG/DL (ref 70–99)
HCT VFR BLD CALC: 36.5 % (ref 37–47)
HEMOGLOBIN: 12.3 G/DL (ref 12–16)
LACTIC ACID: 0.9 MMOL/L (ref 0.5–2.2)
MCH RBC QN AUTO: 30.6 PG (ref 27–31.3)
MCHC RBC AUTO-ENTMCNC: 33.6 % (ref 33–37)
MCV RBC AUTO: 91 FL (ref 82–100)
PDW BLD-RTO: 14.6 % (ref 11.5–14.5)
PLATELET # BLD: 158 K/UL (ref 130–400)
POTASSIUM SERPL-SCNC: 4.5 MEQ/L (ref 3.4–4.9)
RBC # BLD: 4.01 M/UL (ref 4.2–5.4)
SODIUM BLD-SCNC: 136 MEQ/L (ref 135–144)
WBC # BLD: 11.6 K/UL (ref 4.8–10.8)

## 2021-09-27 PROCEDURE — 2580000003 HC RX 258: Performed by: FAMILY MEDICINE

## 2021-09-27 PROCEDURE — 6370000000 HC RX 637 (ALT 250 FOR IP): Performed by: INTERNAL MEDICINE

## 2021-09-27 PROCEDURE — 99222 1ST HOSP IP/OBS MODERATE 55: CPT | Performed by: INTERNAL MEDICINE

## 2021-09-27 PROCEDURE — 1210000000 HC MED SURG R&B

## 2021-09-27 PROCEDURE — 2580000003 HC RX 258: Performed by: INTERNAL MEDICINE

## 2021-09-27 PROCEDURE — 97166 OT EVAL MOD COMPLEX 45 MIN: CPT

## 2021-09-27 PROCEDURE — 6360000002 HC RX W HCPCS: Performed by: INTERNAL MEDICINE

## 2021-09-27 PROCEDURE — 80048 BASIC METABOLIC PNL TOTAL CA: CPT

## 2021-09-27 PROCEDURE — 83605 ASSAY OF LACTIC ACID: CPT

## 2021-09-27 PROCEDURE — 2700000000 HC OXYGEN THERAPY PER DAY

## 2021-09-27 PROCEDURE — 85027 COMPLETE CBC AUTOMATED: CPT

## 2021-09-27 PROCEDURE — 6360000002 HC RX W HCPCS: Performed by: FAMILY MEDICINE

## 2021-09-27 PROCEDURE — 36415 COLL VENOUS BLD VENIPUNCTURE: CPT

## 2021-09-27 PROCEDURE — 97162 PT EVAL MOD COMPLEX 30 MIN: CPT

## 2021-09-27 RX ORDER — ASPIRIN 81 MG/1
81 TABLET, CHEWABLE ORAL DAILY
Status: DISCONTINUED | OUTPATIENT
Start: 2021-09-27 | End: 2021-09-29 | Stop reason: HOSPADM

## 2021-09-27 RX ORDER — VALSARTAN 160 MG/1
320 TABLET ORAL NIGHTLY
Status: DISCONTINUED | OUTPATIENT
Start: 2021-09-27 | End: 2021-09-29 | Stop reason: HOSPADM

## 2021-09-27 RX ORDER — FLUTICASONE PROPIONATE 50 MCG
1 SPRAY, SUSPENSION (ML) NASAL DAILY
Status: DISCONTINUED | OUTPATIENT
Start: 2021-09-27 | End: 2021-09-29 | Stop reason: HOSPADM

## 2021-09-27 RX ORDER — AMLODIPINE BESYLATE 2.5 MG/1
2.5 TABLET ORAL DAILY
Status: DISCONTINUED | OUTPATIENT
Start: 2021-09-27 | End: 2021-09-29 | Stop reason: HOSPADM

## 2021-09-27 RX ORDER — ROSUVASTATIN CALCIUM 10 MG/1
10 TABLET, COATED ORAL NIGHTLY
Status: DISCONTINUED | OUTPATIENT
Start: 2021-09-27 | End: 2021-09-29 | Stop reason: HOSPADM

## 2021-09-27 RX ORDER — PANTOPRAZOLE SODIUM 40 MG/1
40 TABLET, DELAYED RELEASE ORAL DAILY PRN
Status: DISCONTINUED | OUTPATIENT
Start: 2021-09-27 | End: 2021-09-29 | Stop reason: HOSPADM

## 2021-09-27 RX ORDER — LANOLIN ALCOHOL/MO/W.PET/CERES
400 CREAM (GRAM) TOPICAL DAILY
Status: DISCONTINUED | OUTPATIENT
Start: 2021-09-27 | End: 2021-09-29 | Stop reason: HOSPADM

## 2021-09-27 RX ORDER — LEVOTHYROXINE SODIUM 0.05 MG/1
50 TABLET ORAL
Status: DISCONTINUED | OUTPATIENT
Start: 2021-09-28 | End: 2021-09-29 | Stop reason: HOSPADM

## 2021-09-27 RX ORDER — CHOLECALCIFEROL (VITAMIN D3) 125 MCG
1000 CAPSULE ORAL DAILY
Status: DISCONTINUED | OUTPATIENT
Start: 2021-09-27 | End: 2021-09-29 | Stop reason: HOSPADM

## 2021-09-27 RX ADMIN — FLUTICASONE PROPIONATE 1 SPRAY: 50 SPRAY, METERED NASAL at 18:26

## 2021-09-27 RX ADMIN — ROSUVASTATIN CALCIUM 10 MG: 10 TABLET, FILM COATED ORAL at 21:33

## 2021-09-27 RX ADMIN — SODIUM CHLORIDE, PRESERVATIVE FREE 10 ML: 5 INJECTION INTRAVENOUS at 12:48

## 2021-09-27 RX ADMIN — AMLODIPINE BESYLATE 2.5 MG: 2.5 TABLET ORAL at 12:45

## 2021-09-27 RX ADMIN — PIPERACILLIN AND TAZOBACTAM 3375 MG: 3; .375 INJECTION, POWDER, LYOPHILIZED, FOR SOLUTION INTRAVENOUS at 04:46

## 2021-09-27 RX ADMIN — ENOXAPARIN SODIUM 40 MG: 40 INJECTION SUBCUTANEOUS at 08:55

## 2021-09-27 RX ADMIN — CEFAZOLIN 2000 MG: 10 INJECTION, POWDER, FOR SOLUTION INTRAVENOUS at 15:01

## 2021-09-27 RX ADMIN — VANCOMYCIN HYDROCHLORIDE 1250 MG: 5 INJECTION, POWDER, LYOPHILIZED, FOR SOLUTION INTRAVENOUS at 00:27

## 2021-09-27 RX ADMIN — PANTOPRAZOLE SODIUM 40 MG: 40 TABLET, DELAYED RELEASE ORAL at 12:45

## 2021-09-27 RX ADMIN — SERTRALINE HYDROCHLORIDE 50 MG: 50 TABLET ORAL at 12:45

## 2021-09-27 RX ADMIN — VALSARTAN 320 MG: 160 TABLET, FILM COATED ORAL at 21:34

## 2021-09-27 RX ADMIN — CEFAZOLIN 2000 MG: 10 INJECTION, POWDER, FOR SOLUTION INTRAVENOUS at 23:13

## 2021-09-27 RX ADMIN — CYANOCOBALAMIN TAB 500 MCG 1000 MCG: 500 TAB at 12:45

## 2021-09-27 RX ADMIN — Medication 400 MG: at 12:45

## 2021-09-27 RX ADMIN — ASPIRIN 81 MG: 81 TABLET, CHEWABLE ORAL at 12:45

## 2021-09-27 ASSESSMENT — PAIN DESCRIPTION - ORIENTATION
ORIENTATION: RIGHT
ORIENTATION: RIGHT

## 2021-09-27 ASSESSMENT — PAIN SCALES - GENERAL
PAINLEVEL_OUTOF10: 4
PAINLEVEL_OUTOF10: 4

## 2021-09-27 ASSESSMENT — PAIN DESCRIPTION - LOCATION
LOCATION: ELBOW
LOCATION: ELBOW

## 2021-09-27 ASSESSMENT — ENCOUNTER SYMPTOMS
COLOR CHANGE: 1
RESPIRATORY NEGATIVE: 1
EYES NEGATIVE: 1
GASTROINTESTINAL NEGATIVE: 1

## 2021-09-27 ASSESSMENT — PAIN DESCRIPTION - PAIN TYPE: TYPE: ACUTE PAIN

## 2021-09-27 NOTE — CARE COORDINATION
[facility] Case Management Initial Discharge Assessment    Met with Patient to discuss discharge plan. PCP: Nelly Tanner PA-C                                Date of Last Visit: 5/7/21    If no PCP, list provided? N/A    Discharge Planning    Living Arrangements: independently at home    Who do you live with? Self, but her son lives upstairs and other family live nearby. Who helps you with your care:  self or family    If lives at home:     Do you have any barriers navigating in your home? no    Patient can perform ADL? Yes    Current Services (outpatient and in home) :  None    Dialysis: No    Is transportation available to get to your appointments? Yes    DME Equipment:  yes - rollator, wheel chair    Respiratory equipment: PRN/HS Oxygen 3 at hs and prn Liters    Respiratory provider:  yes - med services         Consult with Medication Assistance Program?  No      Does Patient Have a High-Risk for Readmission Diagnosis (CHF, PN, MI, COPD)? No      Initial Discharge Plan? (Note: please see concurrent daily documentation for any updates after initial note). Depending on clinical outcome, pt may need iv antibiotics after d/c. Cm to assess for further d/c needs and referrals.     Readmission Risk              Risk of Unplanned Readmission:  6         Electronically signed by Becki Harkins on 9/26/2021 at 9:21 PM

## 2021-09-27 NOTE — PROGRESS NOTES
Physician Progress Note    2021   12:08 PM    Name:  Miguel Chery  MRN:    [id number]      Day: 1     Admit Date: 2021  5:58 AM  PCP: Bre Mazariegos PA-C    Code Status:  DNR-CCA      Assessment and Plan: Active Problems/ diagnosis:     Concern for right elbow septic joint  COPD-no exacerbation  CAD-stable  HTN  HLD  Hypothyroidism  DHARA    Plan  Resume broad-spectrum antibiotic, currently on vancomycin and Zosyn. Elbow drained in the ED, follow-up culture  Evaluated by orthopedic, no plan for intervention as of now  ID is consulted  Resume home medications  Monitor daily labs  DVT PPx     Subjective:     Pain is controlled. No new symptoms. No fever. Physical Examination:      Vitals:  /76   Pulse 65   Temp 99.1 °F (37.3 °C) (Oral)   Resp 16   Ht 5' 6\" (1.676 m)   Wt 199 lb (90.3 kg)   SpO2 96%   BMI 32.12 kg/m²   Temp (24hrs), Av.5 °F (37.5 °C), Min:99.1 °F (37.3 °C), Max:100 °F (37.8 °C)      General appearance: alert, cooperative and no distress  Mental Status: oriented to person, place and time and normal affect  Lungs: clear to auscultation bilaterally, normal effort  Heart: regular rate and rhythm, no murmur  Abdomen: soft, nontender, nondistended, bowel sounds present, no masses  Extremities: Right elbow erythema noted, no significant tenderness.   No edema, redness, tenderness in the calves  Skin: no gross lesions, rashes    Data:     Labs:  Recent Labs     21  0630 21  0730   WBC 14.2* 11.6*   HGB 15.0 12.3    158     Recent Labs     21  0630 21  0730     --    K 4.1 4.5   CL 98 98   CO2 26 30   BUN 16 14   CREATININE 0.60 0.67   GLUCOSE 135* 110*     Recent Labs     21  0630   AST 18   ALT 15   BILITOT 0.5   ALKPHOS 99       Current Facility-Administered Medications   Medication Dose Route Frequency Provider Last Rate Last Admin    amLODIPine (NORVASC) tablet 2.5 mg  2.5 mg Oral Daily [facility], DO        aspirin chewable tablet 81 mg  81 mg Oral Daily Pitney Maria Eugenia, DO        diclofenac sodium (VOLTAREN) 1 % gel 2 g  2 g Topical 4x Daily PRN Caprice Nielsen, DO        fluticasone [facility]) 50 MCG/ACT nasal spray 1 spray  1 spray Each Nostril Daily [place]gris Maria Eugenia, DO        [START ON 9/28/2021] levothyroxine (SYNTHROID) tablet 50 mcg  50 mcg Oral QAM AC Caprice Nielsen, DO        magnesium oxide (MAG-OX) tablet 400 mg  400 mg Oral Daily Caprice R Morales, DO        pantoprazole (PROTONIX) tablet 40 mg  40 mg Oral Daily PRN Pitney Maria Eugenia, DO        rosuvastatin (CRESTOR) tablet 10 mg  10 mg Oral Nightly Pitney Maria Eugenia, DO        sertraline (ZOLOFT) tablet 50 mg  50 mg Oral Daily Pitney Maria Eugenia, DO        valsartan (DIOVAN) tablet 320 mg  320 mg Oral Nightly Pitney Maria Eugenia, DO        vitamin B-12 (CYANOCOBALAMIN) tablet 1,000 mcg  1,000 mcg Oral Daily Caprice Nielsen, DO        sodium chloride flush 0.9 % injection 5-40 mL  5-40 mL IntraVENous 2 times per day Mili Dominique MD   10 mL at 09/26/21 2001    sodium chloride flush 0.9 % injection 5-40 mL  5-40 mL IntraVENous PRN Mili Dominique MD        0.9 % sodium chloride infusion  25 mL IntraVENous PRN Mili Dominique MD        enoxaparin (LOVENOX) injection 40 mg  40 mg SubCUTAneous Daily Mili Dominique MD   40 mg at 09/27/21 0855    ondansetron (ZOFRAN-ODT) disintegrating tablet 4 mg  4 mg Oral Q8H PRN Mili Dominique MD        Or    ondansetron [facility]) injection 4 mg  4 mg IntraVENous Q6H PRN Mili Dominique MD        polyethylene glycol [facility]) packet 17 g  17 g Oral Daily PRN Mili Dominique MD        acetaminophen (TYLENOL) tablet 650 mg  650 mg Oral Q6H PRN Mili Dominique MD   650 mg at 09/26/21 2000    Or    acetaminophen (TYLENOL) suppository 650 mg  650 mg Rectal Q6H PRN Mili Dominique MD        piperacillin-tazobactam (ZOSYN) 3,375 mg in dextrose 5 % 50 mL IVPB extended infusion (mini-bag)  3,375 mg IntraVENous Q8H Mili Dominique MD   Stopped at 09/27/21 0846    vancomycin (VANCOCIN) 1,250 mg in dextrose 5 % 250 mL IVPB  15 mg/kg IntraVENous Q12H Mili Dominique MD   Stopped at 09/27/21 0132       Additional work up or/and treatment plan may be added today or then after based on clinical progression. I am managing a portion of pt care. Some medical issues are handled by other specialists. Additional work up and treatment should be done in out pt setting by pt PCP and other out pt providers. In addition to examining and evaluating pt, I spent additional time explaining care, normaland abnormal findings, and treatment plan. All of pt questions were answered. Counseling, diet and education were provided. Case will be discussed with nursing staff when appropriate. Family will be updated if and when appropriate.        Electronically signed by Radha Alarcon DO on 9/27/2021 at 12:08 PM

## 2021-09-27 NOTE — CONSULTS
Infectious Disease     Patient Name: Leann Nicholson  Date: 9/27/2021  YOB: 1933  Medical Record Number: [id number]              History of Present Illness:  Coronary artery disease COPD diverticulosis hiatal hernia  Obesity hypertension      Presents with right elbow pain and swelling progressively worsening no fevers chills sweats      Fluid from right elbow was aspirated in the emergency room        Culture, Body Fluid [[id number]] (Abnormal) Collected: 09/26/21 0940   Order Status: Completed Specimen: Body Fluid from Aspirate Updated: 09/27/21 1139    Gram Stain Result Many WBC's   Rare Gram positive cocci   Abnormal     Organism Staph aureus MSSAAbnormal     Body Fluid Culture, Sterile --    Moderate growth   Sensitivity to follow   PBP2= Negative      Culture, Anaerobic and Aerobic [[id number]] Collected: 09/26/21 1035   Order Status: Completed Specimen: Bursa/Synovial Cyst Updated: 09/27/21 1132    Gram Stain Result No WBC's   Rare epithelial cells   No organisms seen     WOUND/ABSCESS No growth 24 hours    Anaerobic Culture Culture in progress         EXAMINATION/TECHNIQUE:  XR ELBOW RIGHT (MIN 3 VIEWS)       HISTORY:  Right elbow pain.       COMPARISON: None       RESULT:       No acute fracture. No dislocation. No elbow joint effusion. Tiny ulnohumeral osteophytes. Joint spaces otherwise appear maintained. Small lateral epicondyle enthesophytes. Diffuse soft tissue swelling about the elbow, especially posteriorly, suggestive    of possible olecranon bursitis.         No other significant abnormality.               Impression       No acute osseous findings. Possible olecranon bursitis. Review of Systems   Constitutional: Negative for chills, diaphoresis, fatigue and fever. HENT: Negative. Eyes: Negative. Respiratory: Negative. Cardiovascular: Negative. Gastrointestinal: Negative. Endocrine: Negative. Genitourinary: Negative. Musculoskeletal: Negative.     Skin: Positive for color change. Neurological: Negative. Review of Systems: All 14 review of systems negative other than as stated above    Social History     Tobacco Use    Smoking status: Former Smoker    Smokeless tobacco: Never Used    Tobacco comment: quit 50 years ago   Substance Use Topics    Alcohol use: No     Alcohol/week: 0.0 standard drinks    Drug use: No         Past Medical History:   Diagnosis Date    BPV (benign positional vertigo) 2019    CAD (coronary artery disease)     Dr Armando Crouch COPD (chronic obstructive pulmonary disease) ([facility] [address] 2012, VQ scan and CXR    has supplemental oxygen, uses it for 4-6 hours nightly, no inhalers use    Diverticulosis     Hiatal hernia     Hiatal hernia with gastroesophageal reflux 2015    [facility] K[facility]    History of blood transfusion     History of colonoscopy with polypectomy 2017    Dr Choco Bravo History of sleep apnea 2011    no CPAP use    Hypertension     Hypothyroidism     Nocturnal hypoxia 2011    nocturnal O2, 3 LNC, 4-6 hours nightly    Obesity (BMI 30-39. 9)     Osteoarthritis of multiple joints     Oxygen dependent 2019    Postnasal drip     Sigmoid diverticulosis     Weakness of both legs            Past Surgical History:   Procedure Laterality Date    HYSTERECTOMY      fibroid, bleeding    JOINT REPLACEMENT      ID COLON CA SCRN NOT HI RSK IND N/A 9/1/2017    COLONOSCOPY performed by Melburn Buerger, MD at [address] [facility] ESOPHAGOGASTRODUODENOSCOPY TRANSORAL DIAGNOSTIC N/A 9/1/2017    EGD ESOPHAGOGASTRODUODENOSCOPY performed by Melburn Buerger, MD at [address] [facility]  [address] Right 04/03/2012    TOTAL KNEE ARTHROPLASTY Bilateral     left x 2, right x1 (Dr Ollie Jung)   [address]  12/29/15    Dr. Krystyna Hamilton         No current facility-administered medications on file prior to encounter.      Current Outpatient Medications on File Prior to Encounter   Medication Sig Dispense Refill    sertraline (ZOLOFT) 50 MG tablet Take 1 tablet by mouth every evening 90 tablet 3    fluticasone (FLONASE) 50 MCG/ACT nasal spray INHALE 2 sprays into the nose daily. 16 g 3    amLODIPine (NORVASC) 2.5 MG tablet TAKE 1 TABLET BY MOUTH DAILY (before lunch) 90 tablet 1    valsartan (DIOVAN) 160 MG tablet Take 2 tablets by mouth nightly 180 tablet 1    pantoprazole (PROTONIX) 40 MG tablet Take 1 tablet by mouth daily as needed (heartburn) 90 tablet 1    diclofenac (VOLTAREN) 50 MG EC tablet TAKE 1 TABLET BY MOUTH TWICE DAILY WITH MEALS. TAKE AS NEEDED FOR JOINT PAIN 60 tablet 5    rosuvastatin (CRESTOR) 10 MG tablet TAKE 1 TABLET BY MOUTH EVERY DAY 90 tablet 1    levothyroxine (SYNTHROID) 50 MCG tablet Take 1 tablet by mouth every morning (before breakfast) 90 tablet 1    polyethylene glycol (GLYCOLAX) 17 GM/SCOOP powder Take 17 g by mouth daily as needed (constipation)      nitroGLYCERIN (NITROSTAT) 0.4 MG SL tablet Place 1 tablet under the tongue every 5 minutes as needed for Chest pain 25 tablet 6    aspirin 81 MG tablet Take 1 tablet by mouth daily 90 tablet 3    magnesium oxide (MAG-OX) 140 MG CAPS Take 140 mg by mouth daily      acetaminophen (TYLENOL) 500 MG tablet Take 500 mg by mouth every 6 hours as needed for Pain (1- 2 tabs in am) Indications: for pain      vitamin B-12 (CYANOCOBALAMIN) 1000 MCG tablet Take 1,000 mcg by mouth daily Indications: in am.      Cholecalciferol (VITAMIN D3) 2000 units CAPS Take 2,000 Units by mouth Daily with supper       Multiple Vitamins-Minerals (MULTIVITAMIN PO) Take 1 tablet by mouth daily       diclofenac sodium (VOLTAREN) 1 % GEL Apply 4 grams topically 4 times daily as needed for Pain (arthritis/joint pain) 200 g 2    Misc.  Devices MISC Portable Oxygen concentrator 1 Device 0    OXYGEN Inhale 3 L into the lungs continuous Uses prn at bedtime         Allergies   Allergen Reactions    Ciprofloxacin Hcl     Lipitor [Atorvastatin]     Alla [Ezetimibe]          Family History   Problem Relation Age of Onset    Diabetes Maternal Aunt     Osteoarthritis Father     Osteoarthritis Sister     Osteoarthritis Brother     Hypertension Mother     Heart Failure Mother         dec age 71, rheumatic         Physical Exam:      Physical Exam  Constitutional:       Appearance: Normal appearance. She is obese. HENT:      Head: Normocephalic and atraumatic. Eyes:      Extraocular Movements: Extraocular movements intact. Pupils: Pupils are equal, round, and reactive to light. Cardiovascular:      Heart sounds: Normal heart sounds. No murmur heard. Pulmonary:      Effort: Pulmonary effort is normal. No respiratory distress. Breath sounds: No stridor. No wheezing, rhonchi or rales. Chest:      Chest wall: No tenderness. Abdominal:      General: There is no distension. Palpations: There is no mass. Tenderness: There is no abdominal tenderness. There is no right CVA tenderness, left CVA tenderness, guarding or rebound. Hernia: No hernia is present. Musculoskeletal:         General: Swelling and tenderness present. No signs of injury. Arms:       Cervical back: Normal range of motion and neck supple. No rigidity. Right lower leg: No edema. Left lower leg: No edema. Lymphadenopathy:      Cervical: No cervical adenopathy. Upper Body:      Right upper body: No supraclavicular, axillary or epitrochlear adenopathy. Left upper body: No supraclavicular, axillary or epitrochlear adenopathy. Skin:     General: Skin is warm. Coloration: Skin is not jaundiced. Findings: Erythema present. Neurological:      General: No focal deficit present. Mental Status: She is alert. Blood pressure 137/76, pulse 65, temperature 99.1 °F (37.3 °C), temperature source Oral, resp. rate 16, height 5' 6\" (1.676 m), weight 199 lb (90.3 kg), SpO2 96 %.       .   Lab Results   Component Value Date

## 2021-09-27 NOTE — PROGRESS NOTES
Pt is A&O x4. Joelyn [place] in place draining yellow urine. Bedding and gown changed d/t purewick malfunction. Medicated with prn Tylenol for temp of 100, a minor headache, and pain to right elbow. Right elbow warm and red. Rewrapped and placed back in sling. Lungs clear. HR irregular, pt states h/o afib.

## 2021-09-27 NOTE — PROGRESS NOTES
[facility] OCCUPATIONAL THERAPY EVALUATION - ACUTE     NAME: Yadira George  : 1933 (80 y.o.)  MRN: [id number]  CODE STATUS: DNR-CCA  Room: K230/J566-58    Date of Service: 2021    Patient Diagnosis(es): Septic bursitis of elbow, right [M71.121]   Chief Complaint   Patient presents with    Emesis    Other     R elbow pain      Patient Active Problem List    Diagnosis Date Noted    Septic bursitis of elbow, right 2021    Mixed hyperlipidemia 2021    Hypothyroidism 2021    Bilateral hip pain 2021    Chronic pain of both knees 2021    B12 deficiency 2020    Chronic fatigue 2020    AV block, 1st degree 2020    Dizziness 2020    Abnormal EKG 2020    At high risk for injury related to fall 2020    Weakness of both legs 2020    Aortic ejection murmur 2020    At high risk for falls 2020    Benign paroxysmal positional vertigo 2020    Coronary artery disease involving native coronary artery of native heart without angina pectoris 2020    Vitamin D deficiency 2018    Essential hypertension 10/20/2015    GERD (gastroesophageal reflux disease)     Diverticulosis     Postnasal drip     [facility]     Obesity (BMI 30-39. 9)     Osteoarthritis of multiple joints     History of sleep apnea         Past Medical History:   Diagnosis Date    BPV (benign positional vertigo)     CAD (coronary artery disease)     [facility] Valente Floyd [facility] and CXR    has supplemental oxygen, uses it for 4-6 hours nightly, no inhalers use    Diverticulosis     Hiatal hernia     Hiatal hernia with gastroesophageal reflux     EGD Dr Brandon Mcclain, [facility]    History of blood transfusion     History of colonoscopy with polypectomy     Dr Kadie De Luna History of sleep apnea     no CPAP use    Hypertension     hearing/hearing concerns     ROM:   LUE AROM (degrees)  LUE AROM : WFL  LUE General AROM: Arthritic changes  Left Hand AROM (degrees)  Left Hand AROM: WFL  RUE AROM (degrees)  RUE General AROM: Limited elbow extension d/t pain, other ROM WFL  Right Hand AROM (degrees)  Right Hand AROM: WFL    Strength:  LUE Strength  Gross LUE Strength: Exceptions to [facility]  L Hand General: 3/5  LUE Strength Comment: 3/5 all planes  RUE Strength  Gross RUE Strength: Exceptions to [facility]  R Hand General: 3/5  RUE Strength Comment: 2+/5 all planes, limited d/t pain    Coordination, Tone, Quality of Movement: Tone RUE  RUE Tone: Normotonic  Tone LUE  LUE Tone: Normotonic  Coordination  Movements Are Fluid And Coordinated: No  Coordination and Movement description: Fine motor impairments, Decreased accuracy, Decreased speed, Right UE, Left UE    Hand Dominance:  Hand Dominance  Hand Dominance: Left    ADL Status:  ADL  Feeding: Setup  Grooming: Setup  UE Bathing: Minimal assistance  LE Bathing: Moderate assistance  UE Dressing: Minimal assistance  LE Dressing: Moderate assistance  Toileting: Moderate assistance  Additional Comments: Simulated ADLs as above.  Limited ability to use UEs effectively, decreased mobility  Toilet Transfers  Toilet Transfer: Unable to assess  Toilet Transfers Comments: Anticipate mod A     Therapy key for assistance levels    Independent = Pt. is able to perform task with no assistance but may require a device   Stand by assistance = Pt. does not perform task at an independent level but does not need physical assistance, requires verbal cues  Minimal, Moderate, Maximal Assistance = Pt. requires physical assistance (25%, 50%, 75% assist from helper) for task but is able to actively participate in task   Dependent = Pt. requires total assistance with task and is not able to actively participate with task completion     Functional Mobility:  Functional Mobility  Functional - Mobility Device:  (HHA)  Activity: Other  Assist Level: Moderate assistance  Functional Mobility Comments: Mod A to step from bed to chair, heavy push through UE to therapist's arm, unable to use [employer] d/t pain in RUE  Transfers  Sit to stand: Moderate assistance  Stand to sit: Minimal assistance  Transfer Comments: Increased time and effort    Bed Mobility  Bed mobility  Rolling to Left: Moderate assistance  Rolling to Right: Unable to assess  Supine to Sit: Moderate assistance, 2 Person assistance  Sit to Supine: Unable to assess (Up to bedside chair to promote OOB activity)    Seated and Standing Balance:  Balance  Sitting Balance: Supervision  Standing Balance: Moderate assistance    Functional Endurance:  Activity Tolerance  Activity Tolerance: Patient limited by pain    D/C Recommendations:  OT D/C RECOMMENDATIONS  REQUIRES OT FOLLOW UP: Yes    Equipment Recommendations:  OT Equipment Recommendations  Other: Continue to assess    OT Education:   OT Education  OT Education: OT Role, Plan of Care  Patient Education: Educated pt. on role of acute care OT  Barriers to Learning: Decreased attention    OT Follow Up:  OT D/C RECOMMENDATIONS  REQUIRES OT FOLLOW UP: Yes     Assessment/Discharge Disposition:  Assessment: Pt is an 80year old woman from home who presents to [facility] with the above deficits which impact her ability to perform ADLs and IADLs. Pt. limited d/t pain and fatigue.   Performance deficits / Impairments: Decreased functional mobility , Decreased ADL status, Decreased endurance, Decreased strength, Decreased balance, Decreased ROM, Decreased fine motor control, Decreased coordination, Decreased safe awareness  Prognosis: Good  Discharge Recommendations: Continue to assess pending progress  Decision Making: Medium Complexity  History: Pt's medical history is moderately complex  Exam: Pt. has 9 performance deficits  Assistance / Modification: Pt. requires min A    Six Click Score   How much help for putting on and taking off regular lower body clothing?: A Lot  How much help for Bathing?: A Lot  How much help for Toileting?: A Lot  How much help for putting on and taking off regular upper body clothing?: A Little  How much help for taking care of personal grooming?: A Little  How much help for eating meals?: A Little  AM-[facility] Inpatient Daily Activity Raw Score: 15  AM-PAC Inpatient ADL T-Scale Score : 34.69  ADL Inpatient CMS 0-100% Score: 56.46    Plan:  Plan  Times per week: 1-4x  Plan weeks: Length of acute stay  Current Treatment Recommendations: Strengthening, Balance Training, Functional Mobility Training, Endurance Training, Pain Management, Safety Education & Training, Patient/Caregiver Education & Training, Equipment Evaluation, Education, & procurement, Self-Care / ADL, Positioning, Cognitive/Perceptual Training    Goals:   Patient will:    - Improve functional endurance to tolerate/complete 30 mins of ADL's  - Be Mod I in UB ADLs   - Be Mod I in LB ADLs  - Be Mod I in ADL transfers without LOB  - Be Mod I in toileting tasks  - Improve R UE Function (AROM, strength, motor control, tone normalization) to complete ADLs as projected. - Improve L UE strength and endurance to 4-/5 in order to participate in self-care activities as projected. - Access appropriate D/C site with as few architectural barriers as possible. - Sequence self-care tasks with no verbal cues for safety    Patient Goal: Patient goals : \"I want to get stronger\"     Discussed and agreed upon: Yes Comments:     Therapy Time:   OT Individual Minutes  Time In: 1006  Time Out: 1022  Minutes: 16    Eval: 16 minutes     Electronically signed by:     SHEREE Garcia OTR/L  9/27/2021, 11:41 AM

## 2021-09-27 NOTE — PROGRESS NOTES
Physical Therapy Med Surg Initial Assessment  [facility]Department[facility]  [facility]       NAME: Yadira George  : 1933 (80 y.o.)  MRN: [id number]  CODE STATUS: DNR-CCA    Date of Service: 2021    Patient Diagnosis(es): Septic bursitis of elbow, right [M71.121]   Chief Complaint   Patient presents with    Emesis    Other     R elbow pain      Patient Active Problem List    Diagnosis Date Noted    Septic bursitis of elbow, right 2021    Mixed hyperlipidemia 2021    Hypothyroidism 2021    Bilateral hip pain 2021    Chronic pain of both knees 2021    B12 deficiency 2020    Chronic fatigue 2020    AV block, 1st degree 2020    Dizziness 2020    Abnormal EKG 2020    At high risk for injury related to fall 2020    Weakness of both legs 2020    Aortic ejection murmur 2020    At high risk for falls 2020    Benign paroxysmal positional vertigo 2020    Coronary artery disease involving native coronary artery of native heart without angina pectoris 2020    Vitamin D deficiency 2018    Essential hypertension 10/20/2015    GERD (gastroesophageal reflux disease)     Diverticulosis     Postnasal drip     [facility] obstructive [facility])     Obesity (BMI 30-39. 9)     Osteoarthritis of multiple joints     History of sleep apnea         Past Medical History:   Diagnosis Date    BPV (benign positional vertigo)     CAD (coronary artery disease)     [facility] Valente Floyd [facility], VQ scan and CXR    has supplemental oxygen, uses it for 4-6 hours nightly, no inhalers use    Diverticulosis     Hiatal hernia     Hiatal hernia with gastroesophageal reflux     EGD Dr Brandon Mcclain, large [facility]    History of blood transfusion     History of colonoscopy with polypectomy     Dr Kadie De Luna History of sleep apnea  no CPAP use    Hypertension     Hypothyroidism     Nocturnal hypoxia 2011    nocturnal O2, 3 LNC, 4-6 hours nightly    Obesity (BMI 30-39. 9)     Osteoarthritis of multiple joints     Oxygen dependent 2019    Postnasal drip     Sigmoid diverticulosis     Weakness of both legs      Past Surgical History:   Procedure Laterality Date    HYSTERECTOMY      fibroid, bleeding    JOINT REPLACEMENT      [place] COLON CA SCRN NOT HI RSK IND N/A 9/1/2017    COLONOSCOPY performed by Sunny Essex, MD at [address] [facility] ESOPHAGOGASTRODUODENOSCOPY TRANSORAL DIAGNOSTIC N/A 9/1/2017    EGD ESOPHAGOGASTRODUODENOSCOPY performed by Sunny Essex, MD at [address], [address] Right 04/03/2012    TOTAL KNEE ARTHROPLASTY Bilateral     left x 2, right x1 (Dr Tonya Vallejo)   [address]  12/29/15    Dr. Leslie Thompson: Yes  Patient assessed for rehabilitation services?: Yes  Family / Caregiver Present: No    Restrictions:  Restrictions/Precautions: Fall Risk (high morelos score)  Position Activity Restriction  Other position/activity restrictions: sling R UE for comfort; no WBing restrictions specified in medical chart     SUBJECTIVE:      Pain  Pre Treatment Pain Screening  Pain at present: 4  Intervention List: Patient able to continue with treatment;Nurse/physician notified    Post Treatment Pain Screening:   Pain Screening  Patient Currently in Pain: Yes  Pain Assessment  Pain Level: 4  Pain Location: Elbow  Pain Orientation: Right    Prior Level of Function:  Social/Functional History  Lives With: Son (son is with pt all the time)  Type of Home: House  Home Layout: Two level, Laundry in basement (shower is downstairs; can stay on main level for bedroom and 1/2 bath)  Home Access: Stairs to enter with rails  Entrance Stairs - Number of Steps: 3  Entrance Stairs - Rails: Left  Bathroom Shower/Tub: Walk-in shower  Bathroom Equipment: Shower chair, Grab bars in shower, Hand-held shower  Home Equipment: 4 wheeled walker, Rolling walker, Oxygen (O2 at night 3Ls)  ADL Assistance: Independent (friend was home with pt during shower; no physical assist; dressing/bathing required increased effort at home)  [address]: Independent (son assists as needed)  Homemaking Responsibilities: Yes  Ambulation Assistance: Independent (rollator)  Transfer Assistance: Independent  Active : No  Patient's  Info: son  Occupation: Retired  Type of occupation: various jobs; 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  Leisure & Hobbies: sits on porch  Additional Comments: pt states it takes her 40 min to negotiate the steps to complete laundry and to use the shower in the basement    OBJECTIVE:   Vision: Impaired (double and blurry vision X 3 yrs- cataracts)  Vision Exceptions: Wears glasses at all times  Hearing: Exceptions to [facility]  Hearing Exceptions: Hard of hearing/hearing concerns    Cognition:  Overall Orientation Status: Within Functional Limits  Follows Commands: Within Functional Limits    Observation/Palpation  Observation: Pt alert and attentive, pleasant, sling present and adjusted for fit for comfort; on O2; pt moves very slowly; SOB after transfer to bedside chair- SPO2 92% on O2    ROM:  RLE AROM: WFL  LLE AROM : WFL (pain L hip with movement- pt states severe OA L hip (long standing issue))    Strength:  Strength RLE  Comment: grossly 3+/5  Strength LLE  Comment: grossly 3+/5    Neuro:  Balance  Posture: Fair (FF in standing)  Sitting - Static: Good  Sitting - Dynamic: Good  Standing - Static: Fair;- (requires B UE support in standing)  Standing - Dynamic: Poor (mod A during gait with B UE support)     Tone RLE  RLE Tone: Normotonic  Tone LLE  LLE Tone: Normotonic  Motor Control  Gross Motor?: WFL  Sensation  Overall Sensation Status: Impaired (intermittent ankles and fingers)    Bed mobility  Rolling to Left: Moderate assistance  Supine to Sit: Moderate assistance;2 Person assistance  Sit to Supine:  (NT- pt left in bedside chair)  Comment: increased time and increased effort with supine to sit transfer    Transfers  Sit to Stand: Moderate Assistance;2 Person Assistance  Stand to sit: Moderate Assistance;2 Person Assistance  Comment: max verbal cues for safe hand placement during stand to sit transfer    Ambulation  Ambulation?: Yes  Ambulation 1  Surface: level tile  Device: Hand-Held Assist  Assistance: 2 Person assistance; Moderate assistance  Quality of Gait: heavy use of UE support to advance LEs; antalgic pattern L, increased time in double limb support  Gait Deviations: Slow April;Decreased step length;Decreased step height  Distance: 5 ft from bed to bedside chair    Stairs/Curb  Stairs?: No         Activity Tolerance  Activity Tolerance: Patient limited by fatigue;Patient limited by endurance; Patient limited by pain  Activity Tolerance: Pt limited by balance deficits          PT Education  PT Education: Goals;PT Role;Plan of Care;General Safety    ASSESSMENT:   Body structures, Functions, Activity limitations: Decreased functional mobility ; Decreased safe awareness;Decreased balance;Decreased ROM; Decreased endurance; Increased pain;Decreased strength;Decreased posture  Decision Making: Medium Complexity  History: high  Exam: med  Clinical Presentation: med    Prognosis: Good    DISCHARGE RECOMMENDATIONS:  Discharge Recommendations: Continue to assess pending progress    Assessment: Pt is 80 y.o. female dx with septic bursitis of right elbow but also exhibiting general mobility decline and difficulty with ADLs. Pt exhibits decreased LE strength, decreased balance, decreased posture, decreased insight into current deficits, decreased activity tolerance for standing functional mobility requiring assistance of two people to complete all mobility with safety. Continued PT is indicated to allow pt to decrease caregiver burden and for improved quality of life.   REQUIRES PT FOLLOW UP: Yes      PLAN OF CARE:  Plan  Times per week: 3-6  Current Treatment Recommendations: Strengthening, Transfer Training, Endurance Training, Neuromuscular Re-education, Patient/Caregiver Education & Training, ROM, Manual Therapy - Soft Tissue Mobilization, Pain Management, Equipment Evaluation, Education, & procurement, Wheelchair Mobility Training, Balance Training, Gait Training, Home Exercise Program, Functional Mobility Training, Stair training, Safety Education & Training, Positioning  Safety Devices  Type of devices: Call light within reach, Chair alarm in place, Left in chair    Goals:  Short term goals  Short term goal 1: Pt will demonstrate HEP indep for continued progression of strength B LEs at home. Long term goals  Long term goal 1: Pt will demonstrate bed mobility SBA to allow pt to transfer out of bed with ease. Long term goal 2: Pt will demonstrate transfers with safest AD SBA  Long term goal 3: Pt will demosntrate amb 50ft with safest AD SBA  Long term goal 4: Pt will demonstrate stair negotiation 1 flight with 1 rail CGA to allow pt to use the shower. [facility] (6 CLICK) BASIC MOBILITY  AM-PAC Inpatient Mobility Raw Score : 11    Therapy Time:   Individual   Time In 5666   Time Out 1022   Minutes [address] Johns PT, 09/27/21 at 11:50 AM         Definitions for assistance levels  Independent = pt does not require any physical supervision or assistance from another person for activity completion. Device may be needed.   Stand by assistance = pt requires verbal cues or instructions from another person, close to but not touching, to perform the activity  Minimal assistance= pt performs 75% or more of the activity; assistance is required to complete the activity  Moderate assistance= pt performs 50% of the activity; assistance is required to complete the activity  Maximal assistance = pt performs 25% of the activity; assistance is required to complete the activity  Dependent = pt requires total physical assistance to accomplish the task

## 2021-09-27 NOTE — CONSULTS
Consult Note  Patient: Kristi Standard  Unit/Bed: X189/N300-36  YOB: 1933  MRN: [id number]  Acct: [de-identified]   Admitting Diagnosis: Septic bursitis of elbow, right [M71.121]  Date:  9/26/2021  Hospital Day: 1    Complaint:  Right elbow pain   Consulting Physician: Dr. Jane Aguila    History of Present Illness:  Patient is a 80year old female patient with past medical history of BPV, CAD, COPD, diverticulosis, hiatal hernia with GERD, sleep apnea (does not use CPAP), HTN, oxygen dependent, obesity, bilateral lower extremity weakness. She presented to the emergency room department for right elbow pain and swelling that worsened over the past couple of days. Imaging of right elbow shows possible olecranon bursitis for which orthopedics was consulted for evaluation and treatment recommendations. PMHx:  Past Medical History:   Diagnosis Date    BPV (benign positional vertigo) 2019    CAD (coronary artery disease)     Dr Verena Hooks COPD (chronic obstructive pulmonary disease) ([facility] 2012, VQ scan and CXR    has supplemental oxygen, uses it for 4-6 hours nightly, no inhalers use    Diverticulosis     Hiatal hernia     Hiatal hernia with gastroesophageal reflux 2015    EGD Dr Omer Burnett[facility]    History of blood transfusion     History of colonoscopy with polypectomy 2017    Dr Toma Ureña History of sleep apnea 2011    no CPAP use    Hypertension     Hypothyroidism     Nocturnal hypoxia 2011    nocturnal O2, 3 LNC, 4-6 hours nightly    Obesity (BMI 30-39. 9)     Osteoarthritis of multiple joints     Oxygen dependent 2019    Postnasal drip     Sigmoid diverticulosis     Weakness of both legs        PSHx:  Past Surgical History:   Procedure Laterality Date    HYSTERECTOMY      fibroid, bleeding    JOINT REPLACEMENT      [place] COLON CA SCRN NOT HI RSK IND N/A 9/1/2017    COLONOSCOPY performed by Meme De León MD at [address] ESOPHAGOGASTRODUODENOSCOPY TRANSORAL DIAGNOSTIC N/A 9/1/2017 EGD ESOPHAGOGASTRODUODENOSCOPY performed by Alon Ding MD at [address], [address] Right 04/03/2012    TOTAL KNEE ARTHROPLASTY Bilateral     left x 2, right x1 (Dr Hakeem Ferguson)   [facility] ENDOSCOPY  12/29/15    Dr. Alejandra Copeland Hx:  Social History     Socioeconomic History    Marital status:      Spouse name: None    Number of children: 7    Years of education: None    Highest education level: None   Occupational History    Occupation: homemaker and , retired   Tobacco Use    Smoking status: Former Smoker    Smokeless tobacco: Never Used    Tobacco comment: quit 50 years ago   Substance and Sexual Activity    Alcohol use: No     Alcohol/week: 0.0 standard drinks    Drug use: No    Sexual activity: Never   Other Topics Concern    None   Social History Narrative    Born in [place] (grandparents came from [place], settled in [place])    ,  (X 2)    Lives in a house with two of her sons    7 children, 3 M, 1 F    Lost 63 yo son in Feb 2018 from lung cancer    Was  for the [employer], crosswords, cooking, housework     Social Determinants of Health     Financial Resource Strain: Low Risk     Difficulty of Paying Living Expenses: Not hard at all   Food Insecurity:     Worried About [address] in the Last Year:     [address] in the Last Year:    Transportation Needs:     Lack of Transportation (Medical):      Lack of Transportation (Non-Medical):    Physical Activity:     Days of Exercise per Week:     Minutes of Exercise per Session:    Stress:     Feeling of Stress :    Social Connections:     Frequency of Communication with Friends and Family:     Frequency of Social Gatherings with Friends and Family:     Attends [employer] Services:     Active Member of Clubs or Organizations:     Attends Club or Organization Meetings:     Marital Status:    Intimate Partner Violence:     Fear of Current or Ex-Partner:     Emotionally Abused:     Physically Abused:     Sexually Abused:        Family Hx:  Family History   Problem Relation Age of Onset    Diabetes Maternal Aunt     Osteoarthritis Father     Osteoarthritis Sister     Osteoarthritis Brother     Hypertension Mother     Heart Failure Mother         dec age 71, rheumatic         Physical Examination:    /76   Pulse 65   Temp 99.1 °F (37.3 °C) (Oral)   Resp 16   Ht 5' 6\" (1.676 m)   Wt 199 lb (90.3 kg)   SpO2 96%   BMI 32.12 kg/m²    Physical Exam    Right elbow is slightly edematous. Mild pain to palpation and ROM causes some discomfort however patient is able to full extend and flex the right upper extremity. There is some erythema and edema appreciated to right elbow with warmth to touch.      LABS:  CBC:   Lab Results   Component Value Date    WBC 11.6 09/27/2021    RBC 4.01 09/27/2021    RBC 4.37 05/08/2012    HGB 12.3 09/27/2021    HCT 36.5 09/27/2021    MCV 91.0 09/27/2021    MCH 30.6 09/27/2021    MCHC 33.6 09/27/2021    RDW 14.6 09/27/2021     09/27/2021    MPV 8.5 10/09/2014     CBC with Differential:    Lab Results   Component Value Date    WBC 11.6 09/27/2021    RBC 4.01 09/27/2021    RBC 4.37 05/08/2012    HGB 12.3 09/27/2021    HCT 36.5 09/27/2021     09/27/2021    MCV 91.0 09/27/2021    MCH 30.6 09/27/2021    MCHC 33.6 09/27/2021    RDW 14.6 09/27/2021    LYMPHOPCT 2.4 09/26/2021    MONOPCT 5.3 09/26/2021    EOSPCT 4.4 04/15/2012    BASOPCT 0.3 09/26/2021    MONOSABS 0.8 09/26/2021    LYMPHSABS 0.3 09/26/2021    EOSABS 0.0 09/26/2021    BASOSABS 0.0 09/26/2021     CMP:    Lab Results   Component Value Date     09/26/2021    K 4.5 09/27/2021    CL 98 09/27/2021    CO2 30 09/27/2021    BUN 14 09/27/2021    CREATININE 0.67 09/27/2021    GFRAA >60.0 09/27/2021    LABGLOM >60.0 09/27/2021    GLUCOSE 110 09/27/2021    GLUCOSE 91 04/15/2012    PROT 7.1 09/26/2021    LABALBU 4.2 09/26/2021    LABALBU 3.0 04/15/2012    CALCIUM 8.7 09/27/2021    BILITOT 0.5 09/26/2021    ALKPHOS 99 09/26/2021    AST 18 09/26/2021    ALT 15 09/26/2021     BMP:    Lab Results   Component Value Date     09/26/2021    K 4.5 09/27/2021    CL 98 09/27/2021    CO2 30 09/27/2021    BUN 14 09/27/2021    LABALBU 4.2 09/26/2021    LABALBU 3.0 04/15/2012    CREATININE 0.67 09/27/2021    CALCIUM 8.7 09/27/2021    GFRAA >60.0 09/27/2021    LABGLOM >60.0 09/27/2021    GLUCOSE 110 09/27/2021    GLUCOSE 91 04/15/2012     Magnesium:  Lab Results   Component Value Date    MG 2.0 10/09/2014     Troponin:    Lab Results   Component Value Date    TROPONINI <0.010 08/31/2017           Assessment:    Active Hospital Problems    Diagnosis Date Noted    Septic bursitis of elbow, right [M71.121] 09/26/2021     Patient admitted to the hospital for septic bursitis of right elbow. While in the emergency room the provider aspirated fluid from the elbow and sent to lab for evaluation. She reports to use her right elbow to frequently to support herself while doing house work and ambulating throughout the house. She denies any injury/trauma to this extremity that she is aware of. Preliminary culture reports shows no WBC's, rare epithelial cells, and no organisms seen. Final report pending. Preliminary blood cultures show no growth to date. This mornings labs compared to labs upon admission are trending downward with WBC count this morning 11.6, lactic acid 0.9. Patient is receiving IV antibiotics zosyn and vanco per primary team with infectious disease consult pending. Surgical intervention is not warranted at this time as. Will continue to follow waiting for final culture report and recommendations of ID.      I have obtained images of the right elbow after verbal permission from patient was obtained using [employer] and have provided them below for review:       Media Information     Document Information    Wound

## 2021-09-27 NOTE — FLOWSHEET NOTE
Pt has been awake most of the day has taken a few cat naps now and then. Pt is alert and oriented x4  But has a very  hard time with walking. Right elbow is red and warm to touch Dr. Brissa Morales into see pt and left wrap of of arm. Pt had right arm in sling but it was out more them in .  PT/OT worked with pt and was able to get her to the chair. after 1/2 hr to 45/min went to put her back into bed and it was really hard to her legs to move. She would just freeze in the middle of transferring her. Pt states at home this happens and she uses the furniture to help him get around. This evening pt was incontent in bed  And while changing her she felt warm temp taken results were 100.6 shanta served Severo Adair and let him know  up to the floor told him that she was running a fever and that I was going to give her tylenol he asked me if she was uncomfortable and I told him no he said to hold off on the tylenol so I did. Re shanta served Abe [place] and told him and he said ok too. Pt received and ate all of her dinner except soup . Electronically signed by Fely Staton LPN on 3/76/4350 at 4:14 PM

## 2021-09-28 LAB
ANION GAP SERPL CALCULATED.3IONS-SCNC: 10 MEQ/L (ref 9–15)
BASOPHILS ABSOLUTE: 0 K/UL (ref 0–0.2)
BASOPHILS RELATIVE PERCENT: 0.2 %
BODY FLUID CULTURE, STERILE: ABNORMAL
BUN BLDV-MCNC: 11 MG/DL (ref 8–23)
CALCIUM SERPL-MCNC: 8.8 MG/DL (ref 8.5–9.9)
CHLORIDE BLD-SCNC: 97 MEQ/L (ref 95–107)
CO2: 26 MEQ/L (ref 20–31)
CREAT SERPL-MCNC: 0.54 MG/DL (ref 0.5–0.9)
EKG ATRIAL RATE: 85 BPM
EKG Q-T INTERVAL: 394 MS
EKG QRS DURATION: 142 MS
EKG QTC CALCULATION (BAZETT): 525 MS
EKG R AXIS: -72 DEGREES
EKG T AXIS: 25 DEGREES
EKG VENTRICULAR RATE: 107 BPM
EOSINOPHILS ABSOLUTE: 0.1 K/UL (ref 0–0.7)
EOSINOPHILS RELATIVE PERCENT: 1.3 %
GFR AFRICAN AMERICAN: >60
GFR NON-AFRICAN AMERICAN: >60
GLUCOSE BLD-MCNC: 104 MG/DL (ref 70–99)
GRAM STAIN RESULT: ABNORMAL
HCT VFR BLD CALC: 35.6 % (ref 37–47)
HEMOGLOBIN: 12 G/DL (ref 12–16)
LYMPHOCYTES ABSOLUTE: 0.7 K/UL (ref 1–4.8)
LYMPHOCYTES RELATIVE PERCENT: 7.1 %
MCH RBC QN AUTO: 30 PG (ref 27–31.3)
MCHC RBC AUTO-ENTMCNC: 33.6 % (ref 33–37)
MCV RBC AUTO: 89.3 FL (ref 82–100)
MONOCYTES ABSOLUTE: 0.9 K/UL (ref 0.2–0.8)
MONOCYTES RELATIVE PERCENT: 8.6 %
NEUTROPHILS ABSOLUTE: 8.2 K/UL (ref 1.4–6.5)
NEUTROPHILS RELATIVE PERCENT: 82.8 %
ORGANISM: ABNORMAL
PDW BLD-RTO: 13.9 % (ref 11.5–14.5)
PLATELET # BLD: 177 K/UL (ref 130–400)
POTASSIUM SERPL-SCNC: 4 MEQ/L (ref 3.4–4.9)
RBC # BLD: 3.99 M/UL (ref 4.2–5.4)
SODIUM BLD-SCNC: 133 MEQ/L (ref 135–144)
WBC # BLD: 9.9 K/UL (ref 4.8–10.8)

## 2021-09-28 PROCEDURE — 99232 SBSQ HOSP IP/OBS MODERATE 35: CPT | Performed by: INTERNAL MEDICINE

## 2021-09-28 PROCEDURE — 6360000002 HC RX W HCPCS: Performed by: FAMILY MEDICINE

## 2021-09-28 PROCEDURE — 80048 BASIC METABOLIC PNL TOTAL CA: CPT

## 2021-09-28 PROCEDURE — 2580000003 HC RX 258: Performed by: FAMILY MEDICINE

## 2021-09-28 PROCEDURE — 36415 COLL VENOUS BLD VENIPUNCTURE: CPT

## 2021-09-28 PROCEDURE — 97535 SELF CARE MNGMENT TRAINING: CPT

## 2021-09-28 PROCEDURE — 97116 GAIT TRAINING THERAPY: CPT

## 2021-09-28 PROCEDURE — 6370000000 HC RX 637 (ALT 250 FOR IP): Performed by: INTERNAL MEDICINE

## 2021-09-28 PROCEDURE — 1210000000 HC MED SURG R&B

## 2021-09-28 PROCEDURE — 6360000002 HC RX W HCPCS: Performed by: INTERNAL MEDICINE

## 2021-09-28 PROCEDURE — 2580000003 HC RX 258: Performed by: INTERNAL MEDICINE

## 2021-09-28 PROCEDURE — 6370000000 HC RX 637 (ALT 250 FOR IP): Performed by: FAMILY MEDICINE

## 2021-09-28 PROCEDURE — 85025 COMPLETE CBC W/AUTO DIFF WBC: CPT

## 2021-09-28 RX ORDER — ETHYL CHLORIDE 100 %
AEROSOL, SPRAY (ML) TOPICAL PRN
Status: DISCONTINUED | OUTPATIENT
Start: 2021-09-28 | End: 2021-09-29 | Stop reason: HOSPADM

## 2021-09-28 RX ADMIN — ACETAMINOPHEN 650 MG: 325 TABLET ORAL at 22:06

## 2021-09-28 RX ADMIN — ENOXAPARIN SODIUM 40 MG: 40 INJECTION SUBCUTANEOUS at 10:09

## 2021-09-28 RX ADMIN — ROSUVASTATIN CALCIUM 10 MG: 10 TABLET, FILM COATED ORAL at 22:06

## 2021-09-28 RX ADMIN — CEFAZOLIN 2000 MG: 10 INJECTION, POWDER, FOR SOLUTION INTRAVENOUS at 06:31

## 2021-09-28 RX ADMIN — CEFAZOLIN 2000 MG: 10 INJECTION, POWDER, FOR SOLUTION INTRAVENOUS at 22:07

## 2021-09-28 RX ADMIN — AMLODIPINE BESYLATE 2.5 MG: 2.5 TABLET ORAL at 10:08

## 2021-09-28 RX ADMIN — SERTRALINE HYDROCHLORIDE 50 MG: 50 TABLET ORAL at 10:08

## 2021-09-28 RX ADMIN — CEFAZOLIN 2000 MG: 10 INJECTION, POWDER, FOR SOLUTION INTRAVENOUS at 14:38

## 2021-09-28 RX ADMIN — SODIUM CHLORIDE, PRESERVATIVE FREE 10 ML: 5 INJECTION INTRAVENOUS at 10:10

## 2021-09-28 RX ADMIN — ASPIRIN 81 MG: 81 TABLET, CHEWABLE ORAL at 10:08

## 2021-09-28 RX ADMIN — Medication 400 MG: at 10:09

## 2021-09-28 RX ADMIN — SODIUM CHLORIDE, PRESERVATIVE FREE 10 ML: 5 INJECTION INTRAVENOUS at 22:06

## 2021-09-28 RX ADMIN — CYANOCOBALAMIN TAB 500 MCG 1000 MCG: 500 TAB at 10:09

## 2021-09-28 RX ADMIN — VALSARTAN 320 MG: 160 TABLET, FILM COATED ORAL at 22:06

## 2021-09-28 RX ADMIN — LEVOTHYROXINE SODIUM 50 MCG: 0.05 TABLET ORAL at 06:31

## 2021-09-28 RX ADMIN — FLUTICASONE PROPIONATE 1 SPRAY: 50 SPRAY, METERED NASAL at 10:16

## 2021-09-28 ASSESSMENT — ENCOUNTER SYMPTOMS
COLOR CHANGE: 1
RESPIRATORY NEGATIVE: 1
GASTROINTESTINAL NEGATIVE: 1

## 2021-09-28 ASSESSMENT — PAIN SCALES - GENERAL
PAINLEVEL_OUTOF10: 0
PAINLEVEL_OUTOF10: 4

## 2021-09-28 NOTE — PROGRESS NOTES
Physical Therapy Med Surg Daily Treatment Note  Facility/Department: [facility]  Room: [facility]/W507-26       NAME: Saniya Howell  : 1933 (80 y.o.)  MRN: [id number]  CODE STATUS: DNR-CCA    Date of Service: 2021    Patient Diagnosis(es): Septic bursitis of elbow, right [M71.121]   Chief Complaint   Patient presents with    Emesis    Other     R elbow pain      Patient Active Problem List    Diagnosis Date Noted    Septic bursitis of elbow, right 2021    Mixed hyperlipidemia 2021    Hypothyroidism 2021    Bilateral hip pain 2021    Chronic pain of both knees 2021    B12 deficiency 2020    Chronic fatigue 2020    AV block, 1st degree 2020    Dizziness 2020    Abnormal EKG 2020    At high risk for injury related to fall 2020    Weakness of both legs 2020    Aortic ejection murmur 2020    At high risk for falls 2020    Benign paroxysmal positional vertigo 2020    Coronary artery disease involving native coronary artery of native heart without angina pectoris 2020    Vitamin D deficiency 2018    Essential hypertension 10/20/2015    GERD (gastroesophageal reflux disease)     Diverticulosis     Postnasal drip     [facility])     Obesity (BMI 30-39. 9)     Osteoarthritis of multiple joints     History of sleep apnea         Past Medical History:   Diagnosis Date    BPV (benign positional vertigo)     CAD (coronary artery disease)     [facility] Zhao Cano [facility], VQ scan and CXR    has supplemental oxygen, uses it for 4-6 hours nightly, no inhalers use    Diverticulosis     Hiatal hernia     [facility] her[facility] with gastroesophageal reflux     [facility]    History of blood transfusion     History of colonoscopy with polypectomy     Dr Zeb Funez History of sleep apnea  no CPAP use    Hypertension     Hypothyroidism     Nocturnal hypoxia 2011    nocturnal O2, 3 LNC, 4-6 hours nightly    Obesity (BMI 30-39. 9)     Osteoarthritis of multiple joints     Oxygen dependent 2019    Postnasal drip     Sigmoid diverticulosis     Weakness of both legs      Past Surgical History:   Procedure Laterality Date    HYSTERECTOMY      fibroid, bleeding    JOINT REPLACEMENT      AL COLON CA SCRN NOT HI RSK IND N/A 9/1/2017    COLONOSCOPY performed by Madie Simmons MD at [address] [facility] ESOPHAGOGASTRODUODENOSCOPY TRANSORAL DIAGNOSTIC N/A 9/1/2017    EGD ESOPHAGOGASTRODUODENOSCOPY performed by Madie Simmons MD at [address] Right 04/03/2012    TOTAL KNEE ARTHROPLASTY Bilateral     left x 2, right x1 (Dr Tree Jama)   Daleen Factor  12/29/15    Dr. Coker Ear          Restrictions  Restrictions/Precautions: Fall Risk (high morelos score)  Position Activity Restriction  Other position/activity restrictions: sling R UE for comfort; no WBing restrictions specified in medical chart    SUBJECTIVE   Subjective  Subjective: I have help at home. I am very weak but I miss my animals. Pre-Session Pain Report  Pre Treatment Pain Screening  Pain at present: 0  Intervention List: Patient able to continue with treatment          Post-Session Pain Report  Pain Assessment  Pain Level: 0         OBJECTIVE        Bed mobility  Rolling to Left: Moderate assistance  Supine to Sit: Moderate assistance  Comment: increased time and effort to complete; vc's for hand placement. Pt elbow was not wrapped or in a sling upon arrival.  Pt had great difficulty moving around in bed and required mod A to come to a sitting position. Transfers  Sit to Stand: Minimal Assistance  Stand to sit: Minimal Assistance  Comment: vc's for hand placement and limiting the use of L arm. Pt attempting to take arm out of sling.  vc's to improve descend to chair to prevent \"plopping. \"    Ambulation 1  Surface: level tile  Device: Rolling Walker  Assistance: Moderate assistance;Minimal assistance  Quality of Gait: antalgic pattern with increased lateral sway, FF posture, heavy leaning on [address], increased double limb support; vc's for posture and keeping arm in sling to immobilize elbow. Gait Deviations: Slow April;Decreased step length;Decreased step height  Distance: 3'                                         Activity Tolerance  Activity Tolerance: Patient Tolerated treatment well;Patient limited by fatigue;Patient limited by endurance          ASSESSMENT   Assessment: Pt had great difficulty sitting up in bed and required Mod A to complete. Pt ambulated with [address] and Mod A to transfer to chair. pt reports increased fatigue and stated, \"I am weak. \"     Discharge Recommendations:  Continue to assess pending progress    Goals  Short term goals  Short term goal 1: Pt will demonstrate HEP indep for continued progression of strength B LEs at home. Long term goals  Long term goal 1: Pt will demonstrate bed mobility SBA to allow pt to transfer out of bed with ease. Long term goal 2: Pt will demonstrate transfers with safest AD SBA  Long term goal 3: Pt will demosntrate amb 50ft with safest AD SBA  Long term goal 4: Pt will demonstrate stair negotiation 1 flight with 1 rail CGA to allow pt to use the shower. PLAN    Safety Devices  Type of devices: Call light within reach; Chair alarm in place; Left in chair; All fall risk precautions in place     [facility] (6 CLICK) BASIC MOBILITY  AM-PAC Inpatient Mobility Raw Score : 13      Therapy Time   Individual   Time In 1139   Time Out 1206   Minutes 27      bm/Trsf - 15 mins  Gait - 12 mins       Gunnar Torres PTA, 09/28/21 at 12:59 PM       Definitions for assistance levels  Independent = pt does not require any physical supervision or assistance from another person for activity completion. Device may be needed.   Stand by assistance = pt requires

## 2021-09-28 NOTE — PROGRESS NOTES
2.5 mg at 09/28/21 1008    aspirin chewable tablet 81 mg  81 mg Oral Daily Pitney Maria Eugenia, DO   81 mg at 09/28/21 1008    diclofenac sodium (VOLTAREN) 1 % gel 2 g  2 g Topical 4x Daily PRN Pitney Maria Eugenia, DO        fluticasone [facility]) 50 MCG/ACT nasal spray 1 spray  1 spray Each Nostril Daily Pitney Maria Eugenia, DO   1 spray at 09/28/21 1016    levothyroxine (SYNTHROID) tablet 50 mcg  50 mcg Oral QAM AC Yazid R Morales, DO   50 mcg at 09/28/21 0631    magnesium oxide (MAG-OX) tablet 400 mg  400 mg Oral Daily Pitney Maria Eugenia, DO   400 mg at 09/28/21 1009    pantoprazole (PROTONIX) tablet 40 mg  40 mg Oral Daily PRN Pitney Maria Eugenia, DO   40 mg at 09/27/21 1245    rosuvastatin (CRESTOR) tablet 10 mg  10 mg Oral Nightly Pitney Maria Eugenia, DO   10 mg at 09/27/21 2133    sertraline (ZOLOFT) tablet 50 mg  50 mg Oral Daily Pitney Maria Eugenia, DO   50 mg at 09/28/21 1008    valsartan (DIOVAN) tablet 320 mg  320 mg Oral Nightly Pitney Maria Eugenia, DO   320 mg at 09/27/21 2134    vitamin B-12 (CYANOCOBALAMIN) tablet 1,000 mcg  1,000 mcg Oral Daily Pitney Maria Eugenia, DO   1,000 mcg at 09/28/21 1009    ceFAZolin (ANCEF) 2000 mg in dextrose 5 % 100 mL IVPB  2,000 mg IntraVENous Q8H San Sicard, MD   Stopped at 09/28/21 0700    sodium chloride flush 0.9 % injection 5-40 mL  5-40 mL IntraVENous 2 times per day Vernon Ronquillo MD   10 mL at 09/28/21 1010    sodium chloride flush 0.9 % injection 5-40 mL  5-40 mL IntraVENous PRN Vernon Ronquillo MD        0.9 % sodium chloride infusion  25 mL IntraVENous PRN Vernon Ronquillo MD        enoxaparin (LOVENOX) injection 40 mg  40 mg SubCUTAneous Daily Vernon Ronquillo MD   40 mg at 09/28/21 1009    ondansetron (ZOFRAN-ODT) disintegrating tablet 4 mg  4 mg Oral Q8H PRN Vernon Ronquillo MD        Or    ondansetron [facility]) injection 4 mg  4 mg IntraVENous Q6H PRN Vrenon Ronquillo MD        polyethylene glycol [facility]) packet 17 g  17 g Oral Daily PRN Vernon Ronquillo MD  acetaminophen (TYLENOL) tablet 650 mg  650 mg Oral Q6H PRN Jay Jay Newsome MD   650 mg at 09/26/21 2000    Or    acetaminophen (TYLENOL) suppository 650 mg  650 mg Rectal Q6H PRN Jay Jay Newsome MD           Additional work up or/and treatment plan may be added today or then after based on clinical progression. I am managing a portion of pt care. Some medical issues are handled by other specialists. Additional work up and treatment should be done in out pt setting by pt PCP and other out pt providers. In addition to examining and evaluating pt, I spent additional time explaining care, normaland abnormal findings, and treatment plan. All of pt questions were answered. Counseling, diet and education were provided. Case will be discussed with nursing staff when appropriate. Family will be updated if and when appropriate.        Electronically signed by Mirian Fregoso DO on 9/28/2021 at 12:26 PM

## 2021-09-28 NOTE — PROGRESS NOTES
Infectious Disease     Patient Name: Sonu Ashraf  Date: 9/28/2021  YOB: 1933  Medical Record Number: [id number]              History of Present Illness:  Coronary artery disease COPD diverticulosis hiatal hernia  Obesity hypertension      Presents with right elbow pain and swelling progressively worsening no fevers chills sweats      Fluid from right elbow was aspirated in the emergency room        Culture, Body Fluid [[id number]] (Abnormal) Collected: 09/26/21 0934   Order Status: Completed Specimen: Body Fluid from Aspirate Updated: 09/27/21 1139    Gram Stain Result Many WBC's   Rare Gram positive cocci   Abnormal     Organism Staph aureus MSSAAbnormal     Body Fluid Culture, Sterile --    Moderate growth   Sensitivity to follow   PBP2= Negative      Culture, Anaerobic and Aerobic [[id number]] Collected: 09/26/21 1035   Order Status: Completed Specimen: Bursa/Synovial Cyst Updated: 09/27/21 1132    Gram Stain Result No WBC's   Rare epithelial cells   No organisms seen     WOUND/ABSCESS No growth 24 hours    Anaerobic Culture Culture in progress         EXAMINATION/TECHNIQUE:  XR ELBOW RIGHT (MIN 3 VIEWS)       HISTORY:  Right elbow pain.       COMPARISON: None       RESULT:       No acute fracture. No dislocation. No elbow joint effusion. Tiny ulnohumeral osteophytes. Joint spaces otherwise appear maintained. Small lateral epicondyle enthesophytes. Diffuse soft tissue swelling about the elbow, especially posteriorly, suggestive    of possible olecranon bursitis.         No other significant abnormality.               Impression       No acute osseous findings. Possible olecranon bursitis. Review of Systems   Constitutional: Negative for chills, diaphoresis, fatigue and fever. Respiratory: Negative. Cardiovascular: Negative. Gastrointestinal: Negative. Skin: Positive for color change. Physical Exam  Cardiovascular:      Heart sounds: Normal heart sounds.  No murmur heard.     Pulmonary:      Effort: Pulmonary effort is normal. No respiratory distress. Breath sounds: No stridor. No wheezing, rhonchi or rales. Chest:      Chest wall: No tenderness. Abdominal:      General: There is no distension. Palpations: There is no mass. Tenderness: There is no abdominal tenderness. There is no guarding. Musculoskeletal:         General: Swelling and tenderness present. No signs of injury. Arms:       Left lower leg: No edema. Lymphadenopathy:      Upper Body:      Right upper body: No supraclavicular, axillary or epitrochlear adenopathy. Left upper body: No supraclavicular, axillary or epitrochlear adenopathy. Skin:     Findings: Erythema present. Blood pressure 128/68, pulse 74, temperature 99.3 °F (37.4 °C), temperature source Oral, resp. rate 18, height 5' 6\" (1.676 m), weight 199 lb (90.3 kg), SpO2 95 %.       .   Lab Results   Component Value Date    WBC 9.9 09/28/2021    HGB 12.0 09/28/2021    HCT 35.6 (L) 09/28/2021    MCV 89.3 09/28/2021     09/28/2021     Lab Results   Component Value Date     09/28/2021    K 4.0 09/28/2021    CL 97 09/28/2021    CO2 26 09/28/2021    BUN 11 09/28/2021    CREATININE 0.54 09/28/2021    GLUCOSE 104 09/28/2021    GLUCOSE 91 04/15/2012    CALCIUM 8.8 09/28/2021              PLAN:  Right elbow olecranon bursitis  Staff aureus infection  MSSA infection     cefazolin

## 2021-09-28 NOTE — CARE COORDINATION
LSW met with the pt to discuss her DC plan. Per PT/OT notes the pt is a 2 person assist for transfers and ambulation. Pt lives at home alone. She has a son that lives upstairs but helps her minimally per daughter. LSW gave SNF list to pt and discussed concerns for safety if she goes home at DC. Pt is insistent that she has a system at home and will be safe to return home at [facility] called pt's daughter to discuss concerns and daughter said that the pt is very stubborn and will not listen to anyone. Pt is alert and oriented so DC plan will be home with [facility].

## 2021-09-28 NOTE — PROGRESS NOTES
Patient right elbow fluctuant with increased edema requiring needle aspiration for decompression. Risks vs benefits of procedure explained to patient including; bleeding, pain at insertion site, recurrent accumulation of fluid in the bursa, and infection. The patient provided both written and verbal consent for needle aspiration for the right elbow. The area was numbed using ethyl chloride spray and cleansed with chlorhexidine. An 18 gauge needle was used to insert into the right elbow for aspiration. A total of 20 cc of bloody fluid was removed from the right elbow with noticeable decrease in edema. Patient reports to have less pain in the elbow after procedure and tolerated procedure well. Soft roll was placed around the right elbow to provide some cushion and then a compression dressing using ACE wrap was placed around the soft roll. Patient to use sling and elevate the extremity on pillows. May use ice PRN.

## 2021-09-28 NOTE — PROGRESS NOTES
09/28/2021    BUN 11 09/28/2021    CREATININE 0.54 09/28/2021    GFRAA >60.0 09/28/2021    LABGLOM >60.0 09/28/2021    GLUCOSE 104 09/28/2021    GLUCOSE 91 04/15/2012    PROT 7.1 09/26/2021    LABALBU 4.2 09/26/2021    LABALBU 3.0 04/15/2012    CALCIUM 8.8 09/28/2021    BILITOT 0.5 09/26/2021    ALKPHOS 99 09/26/2021    AST 18 09/26/2021    ALT 15 09/26/2021     BMP:    Lab Results   Component Value Date     09/28/2021    K 4.0 09/28/2021    CL 97 09/28/2021    CO2 26 09/28/2021    BUN 11 09/28/2021    LABALBU 4.2 09/26/2021    LABALBU 3.0 04/15/2012    CREATININE 0.54 09/28/2021    CALCIUM 8.8 09/28/2021    GFRAA >60.0 09/28/2021    LABGLOM >60.0 09/28/2021    GLUCOSE 104 09/28/2021    GLUCOSE 91 04/15/2012     Magnesium:    Lab Results   Component Value Date    MG 2.0 10/09/2014     Troponin:    Lab Results   Component Value Date    TROPONINI <0.010 08/31/2017     I have obtained images per verbal consent of this patient using haiku which can be located in the media sectio of patient chart. I have also provided them below for review. Media Information     Document Information    Wound      09/28/2021 10:30   Attached To: Hospital Encounter on 9/26/21   Source Information    MATT Valenzuela CNP  [facility]     Media Information     Document Information    Wound      09/28/2021 10:29   Attached To: Hospital Encounter on 9/26/21   Source Information    MATT Valenzuela CNP  [facility]     Media Information     Document Information    Wound      09/28/2021 10:29   Attached To: Hospital Encounter on 9/26/21   Source [address] [place] [address], M[facility]  [facility]           Assessment:    Active Hospital Problems    Diagnosis Date Noted    Septic bursitis of elbow, right [M71.121] 09/26/2021     Patient being seen and evaluated for septic bursitis of right elbow. Fluid was aspirated from the right elbow while patient was in the emergency room. The results of this aspirate shows many WBC's, rare gram positive cocci, Staph Aureus MSSA, with final culture report pending. Preliminary blood cultures show no growth pending final culture report. Preliminary anaerobic and aerobic culture shows no wbc's, rare epithelial cells, no organisms seen, final culture result pending. Infectious disease is on the case and managing antibiotics. There is a change patient may be able to switch to oral antibiotics at discharge. Final antibiotic recommendations pending final culture. It is highly recommended that patient remains in the sling to immobilize the right upper extremity with elevation and ace wrap for compression at 90 degrees to place tension on the bursa to prevent fluid getting into this space. If patient is discharged home per primary and ID team today. Patient will need to follow up in the outpatient orthopedic clinic of Dr. Rene Holguin tomorrow. Plan:  1. Provide compression wrap to the right elbow  2. Immobilize the right elbow with use of sling at all times. May perform ROM to shoulder, wrist, hand, and fingers for effected extremity. 3. Elevate the extremity   4. Ice  5. Atb per ID  6. Discharge planning   7.  Will aspirate the right elbow for decompression   Electronically signedby MATT Canales CNP on 9/28/2021 at 1:58 PM

## 2021-09-29 VITALS
TEMPERATURE: 101.7 F | DIASTOLIC BLOOD PRESSURE: 72 MMHG | SYSTOLIC BLOOD PRESSURE: 140 MMHG | RESPIRATION RATE: 18 BRPM | HEIGHT: 66 IN | HEART RATE: 85 BPM | WEIGHT: 199 LBS | OXYGEN SATURATION: 90 % | BODY MASS INDEX: 31.98 KG/M2

## 2021-09-29 LAB
ANAEROBIC CULTURE: NORMAL
ANION GAP SERPL CALCULATED.3IONS-SCNC: 12 MEQ/L (ref 9–15)
BASOPHILS ABSOLUTE: 0 K/UL (ref 0–0.2)
BASOPHILS RELATIVE PERCENT: 0.5 %
BUN BLDV-MCNC: 13 MG/DL (ref 8–23)
CALCIUM SERPL-MCNC: 9 MG/DL (ref 8.5–9.9)
CHLORIDE BLD-SCNC: 99 MEQ/L (ref 95–107)
CO2: 28 MEQ/L (ref 20–31)
CREAT SERPL-MCNC: 0.63 MG/DL (ref 0.5–0.9)
EOSINOPHILS ABSOLUTE: 0.2 K/UL (ref 0–0.7)
EOSINOPHILS RELATIVE PERCENT: 2.7 %
GFR AFRICAN AMERICAN: >60
GFR NON-AFRICAN AMERICAN: >60
GLUCOSE BLD-MCNC: 112 MG/DL (ref 70–99)
GRAM STAIN RESULT: NORMAL
HCT VFR BLD CALC: 35.1 % (ref 37–47)
HEMOGLOBIN: 11.8 G/DL (ref 12–16)
LYMPHOCYTES ABSOLUTE: 0.7 K/UL (ref 1–4.8)
LYMPHOCYTES RELATIVE PERCENT: 8.5 %
MCH RBC QN AUTO: 30.1 PG (ref 27–31.3)
MCHC RBC AUTO-ENTMCNC: 33.7 % (ref 33–37)
MCV RBC AUTO: 89.3 FL (ref 82–100)
MONOCYTES ABSOLUTE: 0.9 K/UL (ref 0.2–0.8)
MONOCYTES RELATIVE PERCENT: 10.9 %
NEUTROPHILS ABSOLUTE: 6.1 K/UL (ref 1.4–6.5)
NEUTROPHILS RELATIVE PERCENT: 77.4 %
PDW BLD-RTO: 14.3 % (ref 11.5–14.5)
PLATELET # BLD: 201 K/UL (ref 130–400)
POTASSIUM SERPL-SCNC: 4 MEQ/L (ref 3.4–4.9)
RBC # BLD: 3.93 M/UL (ref 4.2–5.4)
SODIUM BLD-SCNC: 139 MEQ/L (ref 135–144)
WBC # BLD: 7.9 K/UL (ref 4.8–10.8)
WOUND/ABSCESS: NORMAL

## 2021-09-29 PROCEDURE — 36415 COLL VENOUS BLD VENIPUNCTURE: CPT

## 2021-09-29 PROCEDURE — 2580000003 HC RX 258: Performed by: INTERNAL MEDICINE

## 2021-09-29 PROCEDURE — 2700000000 HC OXYGEN THERAPY PER DAY

## 2021-09-29 PROCEDURE — 97535 SELF CARE MNGMENT TRAINING: CPT

## 2021-09-29 PROCEDURE — 99232 SBSQ HOSP IP/OBS MODERATE 35: CPT | Performed by: INTERNAL MEDICINE

## 2021-09-29 PROCEDURE — 6370000000 HC RX 637 (ALT 250 FOR IP): Performed by: INTERNAL MEDICINE

## 2021-09-29 PROCEDURE — 6360000002 HC RX W HCPCS: Performed by: INTERNAL MEDICINE

## 2021-09-29 PROCEDURE — 80048 BASIC METABOLIC PNL TOTAL CA: CPT

## 2021-09-29 PROCEDURE — 6360000002 HC RX W HCPCS: Performed by: FAMILY MEDICINE

## 2021-09-29 PROCEDURE — 85025 COMPLETE CBC W/AUTO DIFF WBC: CPT

## 2021-09-29 RX ADMIN — CEFAZOLIN 2000 MG: 10 INJECTION, POWDER, FOR SOLUTION INTRAVENOUS at 06:40

## 2021-09-29 RX ADMIN — LEVOTHYROXINE SODIUM 50 MCG: 0.05 TABLET ORAL at 06:40

## 2021-09-29 RX ADMIN — CEFAZOLIN 2000 MG: 10 INJECTION, POWDER, FOR SOLUTION INTRAVENOUS at 12:52

## 2021-09-29 RX ADMIN — SERTRALINE HYDROCHLORIDE 50 MG: 50 TABLET ORAL at 09:44

## 2021-09-29 RX ADMIN — ENOXAPARIN SODIUM 40 MG: 40 INJECTION SUBCUTANEOUS at 09:44

## 2021-09-29 RX ADMIN — PANTOPRAZOLE SODIUM 40 MG: 40 TABLET, DELAYED RELEASE ORAL at 09:50

## 2021-09-29 RX ADMIN — FLUTICASONE PROPIONATE 1 SPRAY: 50 SPRAY, METERED NASAL at 09:45

## 2021-09-29 RX ADMIN — AMLODIPINE BESYLATE 2.5 MG: 2.5 TABLET ORAL at 09:44

## 2021-09-29 RX ADMIN — Medication 400 MG: at 09:44

## 2021-09-29 RX ADMIN — ASPIRIN 81 MG: 81 TABLET, CHEWABLE ORAL at 09:44

## 2021-09-29 RX ADMIN — CYANOCOBALAMIN TAB 500 MCG 1000 MCG: 500 TAB at 09:44

## 2021-09-29 ASSESSMENT — ENCOUNTER SYMPTOMS
RESPIRATORY NEGATIVE: 1
GASTROINTESTINAL NEGATIVE: 1
COLOR CHANGE: 1

## 2021-09-29 ASSESSMENT — PAIN SCALES - GENERAL
PAINLEVEL_OUTOF10: 0
PAINLEVEL_OUTOF10: 0

## 2021-09-29 NOTE — PROGRESS NOTES
Physical Therapy Med Surg Daily Treatment Note  Facility/Department: [address] [place] [address] [address]  [facility]       NAME: Nel Concepcion  : 1933 (80 y.o.)  MRN: [id number]  CODE STATUS: DNR-CCA    Date of Service: 2021    Patient Diagnosis(es): Septic bursitis of elbow, right [M71.121]   Chief Complaint   Patient presents with    Emesis    Other     R elbow pain      Patient Active Problem List    Diagnosis Date Noted    Septic bursitis of elbow, right 2021    Mixed hyperlipidemia 2021    Hypothyroidism 2021    Bilateral hip pain 2021    Chronic pain of both knees 2021    B12 deficiency 2020    Chronic fatigue 2020    AV block, 1st degree 2020    Dizziness 2020    Abnormal EKG 2020    At high risk for injury related to fall 2020    Weakness of both legs 2020    Aortic ejection murmur 2020    At high risk for falls 2020    Benign paroxysmal positional vertigo 2020    Coronary artery disease involving native coronary artery of native heart without angina pectoris 2020    Vitamin D deficiency 2018    Essential hypertension 10/20/2015    GERD (gastroesophageal reflux disease)     Diverticulosis     Postnasal drip     [facility]     Obesity (BMI 30-39. 9)     Osteoarthritis of multiple joints     History of sleep apnea         Past Medical History:   Diagnosis Date    BPV (benign positional vertigo)     CAD (coronary artery disease)     Dr Brian Sosa COPD (chronic obstructive pulmonary disease) ([facility] 2012, VQ scan and CXR    has supplemental oxygen, uses it for 4-6 hours nightly, no inhalers use    Diverticulosis     Hiatal hernia     Hiatal hernia with gastroesophageal reflux     [facility] Dr Rosemarie Mejia, [facility]    History of blood transfusion     History of colonoscopy with polypectomy     Dr Wing Jefferson History of sleep apnea  no CPAP use    Hypertension     Hypothyroidism     Nocturnal hypoxia 2011    nocturnal O2, 3 LNC, 4-6 hours nightly    Obesity (BMI 30-39. 9)     Osteoarthritis of multiple joints     Oxygen dependent 2019    Postnasal drip     Sigmoid diverticulosis     Weakness of both legs      Past Surgical History:   Procedure Laterality Date    HYSTERECTOMY      fibroid, bleeding    JOINT REPLACEMENT      IA COLON CA SCRN NOT HI RSK IND N/A 9/1/2017    COLONOSCOPY performed by João Stanton MD at [address] [facility] ESOPHAGOGASTRODUODENOSCOPY TRANSORAL DIAGNOSTIC N/A 9/1/2017    EGD ESOPHAGOGASTRODUODENOSCOPY performed by João Stanton MD at [address], [address] Right 04/03/2012    TOTAL KNEE ARTHROPLASTY Bilateral     left x 2, right x1 (Dr Moses Failing)   [address]  12/29/15    Dr. Radha Mckeon       Restrictions  Restrictions/Precautions: Fall Risk (high morelos score)  Position Activity Restriction  Other position/activity restrictions: sling R UE, no ROM R elbow; no WBing restrictions specified in medical chart    SUBJECTIVE   General  Chart Reviewed: Yes  Family / Caregiver Present: No  Subjective  Subjective: \"I've been in this bed enough, I'll get to the chair. \"    Pre-Session Pain Report  Pre Treatment Pain Screening  Pain at present: 0  Scale Used: Numeric Score  Intervention List: Patient able to continue with treatment  Pain Screening  Patient Currently in Pain: No       Post-Session Pain Report  Pain Assessment  Pain Assessment: 0-10  Pain Level: 0         OBJECTIVE        Bed mobility  Supine to Sit: Minimal assistance  Sit to Supine:  (DNT pt into chair to promote OOB activity.)  Comment: increased time and effort to complete, vc's for technique and sequencing. Transfers  Sit to Stand: Minimal Assistance  Stand to sit: Minimal Assistance  Bed to Chair: Moderate assistance  Comment: vc's for hand placement and limiting the use of L arm.  Pt attempting to take arm out of sling. vc's to improve descend to chair to prevent \"plopping. \"    Ambulation  Ambulation?: Yes  Ambulation 1  Surface: level tile  Device: Rolling Walker  Assistance: Minimal assistance  Quality of Gait: antalgic pattern with increased lateral sway, FF posture, heavy leaning on [employer], increased double limb support; vc's for posture and keeping arm in sling to immobilize elbow. pt uses R fingers to lightly  and steer [employer].  Gait Deviations: Slow April;Decreased step length;Decreased step height  Distance: 5'  Comments: crepitus and instability noted while in SLS LLE                                         Activity Tolerance  Activity Tolerance: Patient Tolerated treatment well;Patient limited by fatigue;Patient limited by endurance          ASSESSMENT   Assessment: pt needing decreased assistance for mobility this date, pt with sling on throughout tx and maintains No ROM R elbow. Discharge Recommendations:  Continue to assess pending progress    Goals  Short term goals  Short term goal 1: Pt will demonstrate HEP indep for continued progression of strength B LEs at home. Long term goals  Long term goal 1: Pt will demonstrate bed mobility SBA to allow pt to transfer out of bed with ease. Long term goal 2: Pt will demonstrate transfers with safest AD SBA  Long term goal 3: Pt will demosntrate amb 50ft with safest AD SBA  Long term goal 4: Pt will demonstrate stair negotiation 1 flight with 1 rail CGA to allow pt to use the shower.     PLAN    Times per week: 3-6  Safety Devices  Type of devices: Call light within reach, Chair alarm in place, Left in chair, All fall risk precautions in place     [facility] (6 CLICK) [address] Raw Score : 13      Therapy Time   Individual   Time In 1310   Time Out 1328   Minutes 18      BM/Trsf: 14  Gait: [address] [place], PTA, 09/29/21 at 1:41 PM         Definitions for assistance levels  Independent = pt does not require any physical supervision or assistance from another person for activity completion. Device may be needed.   Stand by assistance = pt requires verbal cues or instructions from another person, close to but not touching, to perform the activity  Minimal assistance= pt performs 75% or more of the activity; assistance is required to complete the activity  Moderate assistance= pt performs 50% of the activity; assistance is required to complete the activity  Maximal assistance = pt performs 25% of the activity; assistance is required to complete the activity  Dependent = pt requires total physical assistance to accomplish the task

## 2021-09-29 NOTE — FLOWSHEET NOTE
Pt awake in room this morning. Assisted with set up of all her meals due to right arm in sling. Pt AM care done by student nurse and was assisted up in chair by PT. New iv was started and antibiotic was given . Report was called to [facility] Sandoval and pt has been up dated as for what time she will be going. Electronically signed by Tabatha [place] LPN on 5/12/8893 at 9:67 PM   Pt has redness in the folds of her tiffanie area and complete bath given by student. Pt states she get that every once in a while she just washes it with soap and water and drys it really good and it goes away. Electronically signed by Tabatha [place] LPN on 3/05/7798 at 0:58 PM

## 2021-09-29 NOTE — CARE COORDINATION
LSW MET WITH THE PT TO DISCUSS HER DC PLAN. PT HAS NOW DECIDED THAT SHE NEEDS REHAB PRIOR TO RETURNING HOME. SHE IS AGREEABLE TO [facility]. REFERRAL SENT TO [facility] FOR REVIEW. Pt is approved and will transfer to [facility] at 6:30 this evening.

## 2021-09-29 NOTE — PROGRESS NOTES
Infectious Disease     Patient Name: Chino Ramos  Date: 9/29/2021  YOB: 1933  Medical Record Number: [id number]              History of Present Illness:  Coronary artery disease COPD diverticulosis hiatal hernia  Obesity hypertension      Presents with right elbow pain and swelling progressively worsening no fevers chills sweats      Fluid from right elbow was aspirated in the emergency room          EXAMINATION/TECHNIQUE:  XR ELBOW RIGHT (MIN 3 VIEWS)       HISTORY:  Right elbow pain.       COMPARISON: None       RESULT:       No acute fracture. No dislocation. No elbow joint effusion. Tiny ulnohumeral osteophytes. Joint spaces otherwise appear maintained. Small lateral epicondyle enthesophytes. Diffuse soft tissue swelling about the elbow, especially posteriorly, suggestive    of possible olecranon bursitis.         No other significant abnormality.               Impression       No acute osseous findings. Possible olecranon bursitis. Review of Systems   Constitutional: Negative for chills, diaphoresis, fatigue and fever. Respiratory: Negative. Cardiovascular: Negative. Gastrointestinal: Negative. Skin: Positive for color change. Physical Exam  Cardiovascular:      Heart sounds: Normal heart sounds. No murmur heard. Pulmonary:      Effort: Pulmonary effort is normal. No respiratory distress. Breath sounds: No stridor. No wheezing, rhonchi or rales. Chest:      Chest wall: No tenderness. Abdominal:      General: There is no distension. Palpations: There is no mass. Tenderness: There is no abdominal tenderness. There is no guarding. Musculoskeletal:         General: Swelling and tenderness present. Arms:          Blood pressure 132/68, pulse 62, temperature 98.9 °F (37.2 °C), temperature source Oral, resp. rate 22, height 5' 6\" (1.676 m), weight 199 lb (90.3 kg), SpO2 95 %.       .   Lab Results   Component Value Date    WBC 7.9 09/29/2021    HGB 11.8 (L) 09/29/2021    HCT 35.1 (L) 09/29/2021    MCV 89.3 09/29/2021     09/29/2021     Lab Results   Component Value Date     09/29/2021    K 4.0 09/29/2021    CL 99 09/29/2021    CO2 28 09/29/2021    BUN 13 09/29/2021    CREATININE 0.63 09/29/2021    GLUCOSE 112 09/29/2021    GLUCOSE 91 04/15/2012    CALCIUM 9.0 09/29/2021      Gram Stain Result Abnormal  09/26/2021  9:34 AM  - [facility]   Many WBC's   Rare Gram positive cocci    Organism Abnormal  09/26/2021  9:34 AM [address] [facility]   Staph aureus MSSA    Body Fluid Culture, Sterile 09/26/2021  9:34 AM  - [facility]   Moderate growth   PBP2= Negative    Testing Performed By    Lab - Abbreviation Name Director Address Valid Date Range   [address] [facility] Brigitte Cortez M.D. [address] [place] [address]. [address] [place] [address] 09/14/21 1214-Present   Narrative  Performed by: [address]  ORDER#: [id number]                          ORDERED BY: JANES PAIGE   SOURCE: Aspirate Body Fluid                COLLECTED:  09/26/21 09:34   ANTIBIOTICS AT [facility]. :                      RECEIVED :  09/26/21 09:34   CALL  [facility] tel. [phone],   Body fluid stain called & read back by Mallory 09/26/2021 13:20, by RHONDA MA   Concentrate (centrifuge) body fluid add SCNFL to order   Susceptibility    Staph aureus mssa (1)    Antibiotic Interpretation JAROD Status    ceFAZolin Sensitive       cefTRIAXone Sensitive       clindamycin Sensitive 0.25 mcg/mL     gentamicin Sensitive <=0.5 mcg/mL     oxacillin Sensitive <=0.25 mcg/mL     trimethoprim-sulfamethoxazole Sensitive <=10 mcg/mL               PLAN:  Right elbow olecranon bursitis  Staff aureus infection  MSSA infection    Okay to discharge nursing home on dicloxacillin for 3 weeks  Patient for follow-up orthopedic surgery to determine if bursa needs to be drained again after discharge

## 2021-09-29 NOTE — DISCHARGE INSTR - COC
Continuity of Care Form    Patient Name: Richmond Luz   :  1933  MRN:  [id number]    Admit date:  2021  Discharge date:  2021    Code Status Order: DNR-CCA   Advance Directives:      Admitting Physician:  Sondra Maxwell MD  PCP: Roxann Valdes PA-C    Discharging Nurse: Juancarlos Johnson Unit/Room#: H345/U111-26  Discharging Unit Phone Number: [phone]    Emergency Contact:   Extended Emergency Contact Information  Primary Emergency Contact: Julieta Langley [facility] [address] Phone: [phone]  Relation: Child  Secondary Emergency Contact: [facility]  Home Phone: [phone]  Relation: Child    Past Surgical History:  Past Surgical History:   Procedure Laterality Date    HYSTERECTOMY      fibroid, bleeding    JOINT REPLACEMENT      FL COLON CA SCRN NOT  [address] IND N/A 2017    COLONOSCOPY performed by Ofe Johnson MD at [address] [facility] ESOPHAGOGASTRODUODENOSCOPY TRANSORAL DIAGNOSTIC N/A 2017    EGD ESOPHAGOGASTRODUODENOSCOPY performed by Ofe Johnson MD at [facility],  [address] Right 2012    TOTAL KNEE ARTHROPLASTY Bilateral     left x 2, right x1 (Dr Obie Kulkarni)   [address] [place] [address] Drive  12/29/15    Dr. Angus Irizarry       Immunization History:   Immunization History   Administered Date(s) Administered    COVID-19, Moderna, PF, 100mcg/0.5mL 2021, 2021    Influenza Vaccine, unspecified formulation 10/20/2015    Influenza Virus Vaccine 10/20/2014    Influenza, High Dose (Fluzone 65 yrs and older) 10/20/2015, 2017, 2018    Influenza, Quadv, adjuvanted, 65 yrs +, IM, PF (Fluad) 2020    Influenza, Triv, inactivated, subunit, adjuvanted, IM (Fluad 65 yrs and older) 2019    Pneumococcal Polysaccharide (Cvucecicy78) 2020    Tetanus 2015    Tetanus Toxoid, absorbed 2015       Active Problems:  Patient Active Problem List   Diagnosis Code    GERD (gastroesophageal reflux disease) K21.9    Diverticulosis K57.90    Postnasal drip R09.82    COPD (chronic obstructive pulmonary disease) ([facility]) J44.9    Obesity (BMI 30-39. 9) E66.9    Osteoarthritis of multiple joints M15.9    History of sleep apnea Z86.69    Essential hypertension I10    Vitamin D deficiency E55.9    At high risk for falls Z91.81    Benign paroxysmal positional vertigo H81.10    Coronary artery disease involving native coronary artery of native heart without angina pectoris I25.10    AV block, 1st degree I44.0    Dizziness R42    Abnormal EKG R94.31    At high risk for injury related to fall Z91.81    Weakness of both legs R29.898    Aortic ejection murmur I35.1    B12 deficiency E53.8    Chronic fatigue R53.82    Mixed hyperlipidemia E78.2    Hypothyroidism E03.9    Bilateral hip pain M25.551, M25.552    Chronic pain of both knees M25.561, M25.562, G89.29    Septic bursitis of elbow, right M71.121       Isolation/Infection:   Isolation            No Isolation          Patient Infection Status       Infection Onset Added Last Indicated Last Indicated By Review Planned Expiration Resolved Resolved By    None active    Resolved    COVID-19 Rule Out 09/26/21 09/26/21 09/26/21 COVID-19, Rapid (Ordered)   09/26/21 Rule-Out Test Resulted            Nurse Assessment:  Last Vital Signs: /68   Pulse 62   Temp 98.9 °F (37.2 °C) (Oral)   Resp 22   Ht 5' 6\" (1.676 m)   Wt 199 lb (90.3 kg)   SpO2 95%   BMI 32.12 kg/m²     Last documented pain score (0-10 scale): Pain Level: 0  Last Weight:   Wt Readings from Last 1 Encounters:   09/26/21 199 lb (90.3 kg)     Mental Status:  oriented, alert, coherent, logical, thought processes intact and able to concentrate and follow conversation    IV Access:  - None    Nursing Mobility/ADLs:  Walking   Assisted  Transfer  Assisted  Bathing  Assisted   Dressing  Dependent  Toileting  Assisted  Feeding  Assisted  Med Admin  Assisted  Med Delivery   whole    Wound Care Documentation and Therapy:        Elimination:  Continence:   · Bowel: Yes  · Bladder: Yes  Urinary Catheter: None   Colostomy/Ileostomy/Ileal Conduit: No       Date of Last BM: 09/29/2021  No intake or output data in the 24 hours ending 09/29/21 1106  No intake/output data recorded. Safety Concerns:     History of Falls (last 30 days)    Impairments/Disabilities:      Vision glasses    Nutrition Therapy:  Current Nutrition Therapy:   - Oral Diet:  General    Routes of Feeding: Oral  Liquids: Thin Liquids  Daily Fluid Restriction: no  Last Modified Barium Swallow with Video (Video Swallowing Test): not done    Treatments at the Time of Hospital Discharge:   Respiratory Treatments: ***  Oxygen Therapy:  is on oxygen at 3 L/min per nasal cannula.   Ventilator:    - No ventilator support    Rehab Therapies: Physical Therapy and Occupational Therapy  Weight Bearing Status/Restrictions: No weight bearing restirctions no rom to elbow until swelling decreases  Other Medical Equipment (for information only, NOT a DME order):  walker  Other Treatments: ***    Patient's personal belongings (please select all that are sent with patient):  Glasses    RN SIGNATURE:  Electronically signed by Minal Jauregui RN on 9/29/21 at 4:57 PM EDT    CASE MANAGEMENT/SOCIAL WORK SECTION    Inpatient Status Date: 09/26/2021    Readmission Risk Assessment Score:  Readmission Risk              Risk of Unplanned Readmission:  10           Discharging to Facility/ Agency   · [facility]   · Address:  · Phone:  · Fax:    Dialysis Facility (if applicable)   · Name:  · Address:  · Dialysis Schedule:  · Phone:  · Fax:    / signature: Electronically signed by SUHAIL Lua on 9/29/21 at 11:06 AM EDT    PHYSICIAN SECTION    Prognosis: Fair    Condition at Discharge: Stable    Rehab Potential (if transferring to Rehab): Good    Recommended Labs or Other Treatments After Discharge:

## 2021-09-29 NOTE — CARE COORDINATION
Spoke with Dr. Jenn Luna re: d/c he specifies strict f/u with ortho that treated in pt stay. Spoke with Awilda SEPULVEDA (ortho group) and she wants f/u 10/6 to be made with Dr. Ivelisse Sanford to determine if needs further draining. Confirmed with  Elizabeth Vidal appt. Made. Pt. Will be going to [facility] tentative  after 1600 today. Pt. To receive dicloxacillin x 3 weeks.  Electronically signed by Maria Elena Townsend RN on 9/29/2021 at 2:36 PM

## 2021-09-29 NOTE — PROGRESS NOTES
Physician Progress Note    2021   12:02 PM    Name:  Yadira George  MRN:    [id number]     IP Day: 3     Admit Date: 2021  5:58 AM  PCP: Rubio Mazariegos PA-C    Code Status:  DNR-CCA      Assessment and Plan: Active Problems/ diagnosis:     Concern for right elbow septic joint  COPD-no exacerbation  CAD-stable  HTN  HLD  Hypothyroidism  DHARA    Plan  1. Clinically much better. The erythema improved significantly since yesterday. 2. Resume antibiotic as per infectious ease. Currently on Ancef. Possible p.o. antibiotics today. 3. Evaluated by orthopedic, no plan for intervention as of now  4. Resume home medications  5. Monitor daily labs  6. DVT PPx     Dispo-discharge if okay by ID. Plan for SNF. Subjective:     Pain is controlled. No new symptoms. No fever. Physical Examination:      Vitals:  /68   Pulse 62   Temp 98.9 °F (37.2 °C) (Oral)   Resp 22   Ht 5' 6\" (1.676 m)   Wt 199 lb (90.3 kg)   SpO2 95%   BMI 32.12 kg/m²   Temp (24hrs), Av.9 °F (37.2 °C), Min:98.9 °F (37.2 °C), Max:98.9 °F (37.2 °C)      General appearance: alert, cooperative and no distress  Mental Status: oriented to person, place and time and normal affect  Lungs: clear to auscultation bilaterally, normal effort  Heart: regular rate and rhythm, no murmur  Abdomen: soft, nontender, nondistended, bowel sounds present, no masses  Extremities: Right elbow erythema noted, improved compared to yesterday, . No edema, redness, tenderness in the calves  Skin: no gross lesions, rashes    Data:     Labs:  Recent Labs     21  0558 21  0556   WBC 9.9 7.9   HGB 12.0 11.8*    201     Recent Labs     21  0558 21  0557   * 139   K 4.0 4.0   CL 97 99   CO2 26 28   BUN 11 13   CREATININE 0.54 0.63   GLUCOSE 104* 112*     No results for input(s): AST, ALT, ALB, BILITOT, ALKPHOS in the last 72 hours.     Current Facility-Administered Medications   Medication Dose Route Frequency Provider ondansetron (ZOFRAN) injection 4 mg  4 mg IntraVENous Q6H PRN Raf Tomas MD        polyethylene glycol [facility]) packet 17 g  17 g Oral Daily PRN Raf Tomas MD        acetaminophen (TYLENOL) tablet 650 mg  650 mg Oral Q6H PRN Raf Tomas MD   650 mg at 09/28/21 2206    Or    acetaminophen (TYLENOL) suppository 650 mg  650 mg Rectal Q6H PRN Raf Tomas MD           Additional work up or/and treatment plan may be added today or then after based on clinical progression. I am managing a portion of pt care. Some medical issues are handled by other specialists. Additional work up and treatment should be done in out pt setting by pt PCP and other out pt providers. In addition to examining and evaluating pt, I spent additional time explaining care, normaland abnormal findings, and treatment plan. All of pt questions were answered. Counseling, diet and education were provided. Case will be discussed with nursing staff when appropriate. Family will be updated if and when appropriate.        Electronically signed by Any Ludwig DO on 9/29/2021 at 12:02 PM

## 2021-09-29 NOTE — DISCHARGE SUMMARY
Hospital Medicine Discharge Summary    Saniya Howell  :  1933  MRN:  [id number]    Admit date:  2021  Discharge date:  2021    Admitting Physician:  Rogelio Cr MD  Primary Care Physician:  Kike Kinsey PA-C      Discharge Diagnoses:      Concern for right elbow septic joint  COPD-no exacerbation  CAD-stable  HTN  HLD  Hypothyroidism  DHARA    Chief Complaint   Patient presents with    Emesis    Other     R elbow pain      Hospital Course:       Patient is an 55-year-old female past with history of CAD, COPD, hypertension, HLD, hypothyroidism and DHARA presented to the hospital with emesis and right elbow pain. She was treated for right elbow cellulitis. She was evaluated by orthopedic surgery. No septic joint. No intervention from orthopedic was needed. ID also followed the patient initially treated with IV antibiotics. ID 3 today for right elbow olecranon bursitis due to staph/MSSA infection. She was discharged on dicloxacillin for 3 weeks. Plan to follow-up as outpatient with ID and PCP. Exam on discharge:   /68   Pulse 62   Temp 98.9 °F (37.2 °C) (Oral)   Resp 22   Ht 5' 6\" (1.676 m)   Wt 199 lb (90.3 kg)   SpO2 95%   BMI 32.12 kg/m²   General appearance: No apparent distress, appears stated age and cooperative. HEENT: Pupils equal, round, and reactive to light. Conjunctivae/corneas clear. Neck: Supple, with full range of motion. No jugular venous distention. Trachea midline. Respiratory:  Normal respiratory effort. Clear to auscultation, bilaterally without Rales/Wheezes/Rhonchi. Cardiovascular: Regular rate and rhythm with normal S1/S2 without murmurs, rubs or gallops. Abdomen: Soft, non-tender, non-distended with normal bowel sounds. Musculoskeletal: No clubbing, cyanosis or edema bilaterally. Full range of motion without deformity. Skin: Skin color, texture, turgor normal.  No rashes or lesions. Neuro: Non Focal. Symetrical motor and tone.  Nl Comprehension, Alert,awake and oriented. NL CN. Symetrical tone and reflexes. Psychiatric: Alert and oriented, thought content appropriate, normal insight  Capillary Refill: Brisk,< 3 seconds   Peripheral Pulses: +2 palpable, equal bilaterally     Patient was seen by the following consultants while admitted to [facility]:   Consults:  [address] TO INFECTIOUS DISEASES    Significant Diagnostic Studies:    Refer to chart     Please refer to chart if no studies are shown here    XR ELBOW RIGHT (MIN 3 VIEWS)    Result Date: 2021  EXAMINATION/TECHNIQUE:  XR ELBOW RIGHT (MIN 3 VIEWS) HISTORY:  Right elbow pain. COMPARISON: None RESULT: No acute fracture. No dislocation. No elbow joint effusion. Tiny ulnohumeral osteophytes. Joint spaces otherwise appear maintained. Small lateral epicondyle enthesophytes. Diffuse soft tissue swelling about the elbow, especially posteriorly, suggestive of possible olecranon bursitis. No other significant abnormality. No acute osseous findings. Possible olecranon bursitis. Discharge Medications: Laverna Bedolla \"Gali\"   Home Medication Instructions QW    Printed on:21 1204   Medication Information                      acetaminophen (TYLENOL) 500 MG tablet  Take 500 mg by mouth every 6 hours as needed for Pain (1- 2 tabs in am) Indications: for pain             amLODIPine (NORVASC) 2.5 MG tablet  TAKE 1 TABLET BY MOUTH DAILY (before lunch)             aspirin 81 MG tablet  Take 1 tablet by mouth daily             Cholecalciferol (VITAMIN D3) 2000 units CAPS  Take 2,000 Units by mouth Daily with supper              diclofenac (VOLTAREN) 50 MG EC tablet  TAKE 1 TABLET BY MOUTH TWICE DAILY WITH MEALS.  TAKE AS NEEDED FOR JOINT PAIN             diclofenac sodium (VOLTAREN) 1 % GEL  Apply 4 grams topically 4 times daily as needed for Pain (arthritis/joint pain)             fluticasone (FLONASE) 50 MCG/ACT nasal spray  INHALE 2 sprays into the nose daily. levothyroxine (SYNTHROID) 50 MCG tablet  Take 1 tablet by mouth every morning (before breakfast)             magnesium oxide (MAG-OX) 140 MG CAPS  Take 140 mg by mouth daily             Misc. Devices MISC  Portable Oxygen concentrator             Multiple Vitamins-Minerals (MULTIVITAMIN PO)  Take 1 tablet by mouth daily              nitroGLYCERIN (NITROSTAT) 0.4 MG SL tablet  Place 1 tablet under the tongue every 5 minutes as needed for Chest pain             OXYGEN  Inhale 3 L into the lungs continuous Uses prn at bedtime             pantoprazole (PROTONIX) 40 MG tablet  Take 1 tablet by mouth daily as needed (heartburn)             polyethylene glycol (GLYCOLAX) 17 GM/SCOOP powder  Take 17 g by mouth daily as needed (constipation)             rosuvastatin (CRESTOR) 10 MG tablet  TAKE 1 TABLET BY MOUTH EVERY DAY             sertraline (ZOLOFT) 50 MG tablet  Take 1 tablet by mouth every evening             valsartan (DIOVAN) 160 MG tablet  Take 2 tablets by mouth nightly             vitamin B-12 (CYANOCOBALAMIN) 1000 MCG tablet  Take 1,000 mcg by mouth daily Indications: in am.                 Disposition:   If discharged to Home, [place] [address] needs that were indicated and/or required as been addressed and set up by Social Work. Condition at discharge: good     Activity: activity as tolerated    Total time taken for discharging this patient: 40 minutes. Greater than 70% of time was spent focused exclusively on this patient. Time was taken to review chart, discuss plans with consultants, reconciling medications, discussing plan answering questions with patient.      Angus Oh DO  9/29/2021, 12:07 PM  ----------------------------------------------------------------------------------------------------------------------    Yadira George

## 2021-10-01 ENCOUNTER — OFFICE VISIT (OUTPATIENT)
Dept: GERIATRIC MEDICINE | Age: 86
End: 2021-10-01
Payer: MEDICARE

## 2021-10-01 DIAGNOSIS — E03.9 HYPOTHYROIDISM, UNSPECIFIED TYPE: ICD-10-CM

## 2021-10-01 DIAGNOSIS — R53.1 WEAKNESS: ICD-10-CM

## 2021-10-01 DIAGNOSIS — M71.121 SEPTIC BURSITIS OF ELBOW, RIGHT: Primary | ICD-10-CM

## 2021-10-01 LAB
BLOOD CULTURE, ROUTINE: NORMAL
CULTURE, BLOOD 2: NORMAL

## 2021-10-01 PROCEDURE — 99305 1ST NF CARE MODERATE MDM 35: CPT | Performed by: INTERNAL MEDICINE

## 2021-10-01 PROCEDURE — 1123F ACP DISCUSS/DSCN MKR DOCD: CPT | Performed by: INTERNAL MEDICINE

## 2021-10-01 PROCEDURE — G8484 FLU IMMUNIZE NO ADMIN: HCPCS | Performed by: INTERNAL MEDICINE

## 2021-10-06 ENCOUNTER — OFFICE VISIT (OUTPATIENT)
Dept: GERIATRIC MEDICINE | Age: 86
End: 2021-10-06
Payer: MEDICARE

## 2021-10-06 DIAGNOSIS — Z91.81 AT HIGH RISK FOR FALLS: ICD-10-CM

## 2021-10-06 DIAGNOSIS — M71.121 SEPTIC OLECRANON BURSITIS OF RIGHT ELBOW: Primary | ICD-10-CM

## 2021-10-06 PROCEDURE — 1123F ACP DISCUSS/DSCN MKR DOCD: CPT | Performed by: NURSE PRACTITIONER

## 2021-10-06 PROCEDURE — G8484 FLU IMMUNIZE NO ADMIN: HCPCS | Performed by: NURSE PRACTITIONER

## 2021-10-06 PROCEDURE — 99308 SBSQ NF CARE LOW MDM 20: CPT | Performed by: NURSE PRACTITIONER

## 2021-10-07 VITALS
OXYGEN SATURATION: 97 % | SYSTOLIC BLOOD PRESSURE: 130 MMHG | HEART RATE: 77 BPM | DIASTOLIC BLOOD PRESSURE: 68 MMHG | RESPIRATION RATE: 18 BRPM

## 2021-10-07 NOTE — PROGRESS NOTES
Subjective:    [address]  Patient ID: Hazel Carvajal is a pleasant 80 y.o. female who presents today for:  Chief Complaint   Patient presents with    Other     right olecranon bursitis    Patient sitting comfortably in her chair. Her right arm is in a sling and her elbow is wrapped. Admitted with septic bursitis. Oral antibiotics. Denies pain. Tolerating p.o. No nausea vomiting or diarrhea. No abdominal pain. She denies chest pain, shortness of breath, or palpitations. Therapist reports she is a heavy 2 person assist.  Uses her elbows to walk with a Rollator and get up out of chair. [place] the reason why she has bursitis. Patient Active Problem List   Diagnosis    GERD (gastroesophageal reflux disease)    Diverticulosis    Postnasal drip    COPD (chronic obstructive pulmonary disease) ([facility]    Obesity (BMI 30-39. 9)    Osteoarthritis of multiple joints    History of sleep apnea    Essential hypertension    Vitamin D deficiency    At high risk for falls    Benign paroxysmal positional vertigo    Coronary artery disease involving native coronary artery of native heart without angina pectoris    AV block, 1st degree    Dizziness    Abnormal EKG    At high risk for injury related to fall    Weakness of both legs    Aortic ejection murmur    B12 deficiency    Chronic fatigue    Mixed hyperlipidemia    Hypothyroidism    Bilateral hip pain    Chronic pain of both knees    Septic bursitis of elbow, right     Past Medical History:   Diagnosis Date    BPV (benign positional vertigo) 2019    CAD (coronary artery disease)     Dr Ivanna Dsouza COPD (chronic obstructive pulmonary disease) ([facility] [address] 2012, VQ scan and CXR    has supplemental oxygen, uses it for 4-6 hours nightly, no inhalers use    Diverticulosis     Hiatal hernia     Hiatal hernia with gastroesophageal reflux 2015    EGD Dr Renay Valentino, [facility]    History of blood transfusion     History of colonoscopy with polypectomy 2017    Dr Maura Hill History of sleep apnea 2011    no CPAP use    Hypertension     Hypothyroidism     Nocturnal hypoxia 2011    nocturnal O2, 3 LNC, 4-6 hours nightly    Obesity (BMI 30-39. 9)     Osteoarthritis of multiple joints     Oxygen dependent 2019    Postnasal drip     Sigmoid diverticulosis     Weakness of both legs      Past Surgical History:   Procedure Laterality Date    HYSTERECTOMY      fibroid, bleeding    JOINT REPLACEMENT      FL COLON CA SCRN NOT HI RSK IND N/A 9/1/2017    COLONOSCOPY performed by Madie Simmons MD at [address] [facility] ESOPHAGOGASTRODUODENOSCOPY TRANSORAL DIAGNOSTIC N/A 9/1/2017    EGD ESOPHAGOGASTRODUODENOSCOPY performed by Madie Simmons MD at [address], [address] Right 04/03/2012    TOTAL KNEE ARTHROPLASTY Bilateral     left x 2, right x1 (Dr Tree Jama)   [address] [facility] [address]  12/29/15    Dr. Maris Lane Marital status:       Spouse name: Not on file    Number of children: 9    Years of education: Not on file    Highest education level: Not on file   Occupational History    Occupation: homemaker and , retired   Tobacco Use    Smoking status: Former Smoker    Smokeless tobacco: Never Used    Tobacco comment: quit 50 years ago   Substance and Sexual Activity    Alcohol use: No     Alcohol/week: 0.0 standard drinks    Drug use: No    Sexual activity: Never   Other Topics Concern    Not on file   Social History Narrative    Born in [place] (grandparents came from [place], settled in [place])    ,  (X 2)    Lives in a house with two of her sons    7 children, 3 M, 1 F    Lost 61 yo son in Feb 2018 from lung cancer    Was  for the [employer], [employer], housework     Social Determinants of Health     Financial Resource Strain: Low Risk     Difficulty of Paying Living Expenses: Not hard at all Food Insecurity:     Worried About Running Out of Food in the Last Year:     [address] in the Last Year:    Transportation Needs:     Lack of Transportation (Medical):  Lack of Transportation (Non-Medical):    Physical Activity:     Days of Exercise per Week:     Minutes of Exercise per Session:    Stress:     Feeling of Stress :    Social Connections:     Frequency of Communication with Friends and Family:     Frequency of Social Gatherings with Friends and Family:     Attends Scientology Services:     Active Member of Clubs or Organizations:     Attends Club or Organization Meetings:     Marital Status:    Intimate Partner Violence:     Fear of Current or Ex-Partner:     Emotionally Abused:     Physically Abused:     Sexually Abused:      Family History   Problem Relation Age of Onset    Diabetes Maternal Aunt     Osteoarthritis Father     Osteoarthritis Sister     Osteoarthritis Brother     Hypertension Mother     Heart Failure Mother         dec age 71, rheumatic     Allergies   Allergen Reactions    Ciprofloxacin Hcl     Lipitor [Atorvastatin]     Zetia [Ezetimibe]          Review of Systems   All other systems reviewed and are negative. Objective:   130/68, 98, 77, 18, 97%    Physical Exam  Vitals reviewed. Constitutional:       General: She is awake. She is not in acute distress. Appearance: Normal appearance. She is well-developed. She is obese. She is not ill-appearing, toxic-appearing or diaphoretic. HENT:      Head: Normocephalic and atraumatic. Eyes:      General: No scleral icterus. Conjunctiva/sclera: Conjunctivae normal.      Pupils: Pupils are equal, round, and reactive to light. Cardiovascular:      Rate and Rhythm: Normal rate and regular rhythm. Heart sounds: Normal heart sounds. No murmur heard. Pulmonary:      Effort: Pulmonary effort is normal. No respiratory distress. Breath sounds: Normal breath sounds. No stridor.  No wheezing, rhonchi or rales. Chest:      Chest wall: No tenderness. Abdominal:      General: Abdomen is protuberant. Bowel sounds are normal. There is no distension. Palpations: Abdomen is soft. There is no mass. Tenderness: There is no abdominal tenderness. There is no guarding or rebound. Musculoskeletal:         General: Normal range of motion. Right elbow: Swelling and deformity present. No lacerations. Normal range of motion. No tenderness. Left elbow: Normal.      Cervical back: Normal range of motion and neck supple. Right lower leg: No edema. Left lower leg: No edema. Lymphadenopathy:      Cervical: No cervical adenopathy. Skin:     General: Skin is warm and dry. Coloration: Skin is not cyanotic, jaundiced, mottled or pale. Findings: No abscess, bruising, erythema or rash. Comments: No erythema on right elbow   Neurological:      Mental Status: She is alert and oriented to person, place, and time. Mental status is at baseline. Motor: Weakness present. Psychiatric:         Mood and Affect: Mood normal.         Behavior: Behavior normal. Behavior is cooperative. Thought Content: Thought content normal.         Judgment: Judgment normal.       Assessment:       Diagnosis Orders   1. Septic olecranon bursitis of right elbow     2. At high risk for falls       Plan:   1. Continue with oral antibiotics. - Keep ortho appt. - Avoid leaning on elbows/applying pressure. - Keep right arm in sling.  - Keep dressing intact.  - Ok to remove for bathing. 2. Continue with PT/OT. -Fall risk precautions in place. Side effects, adverse effects of the medication prescribed today, as well as treatment plan and result expectations have beendiscussed with the patient who expresses understanding and desires to proceed.     MATT Zuluaga - CNP

## 2021-10-18 ENCOUNTER — VIRTUAL VISIT (OUTPATIENT)
Dept: INFECTIOUS DISEASES | Age: 86
End: 2021-10-18
Payer: MEDICARE

## 2021-10-18 ENCOUNTER — OFFICE VISIT (OUTPATIENT)
Dept: GERIATRIC MEDICINE | Age: 86
End: 2021-10-18
Payer: MEDICARE

## 2021-10-18 DIAGNOSIS — M71.121 SEPTIC BURSITIS OF ELBOW, RIGHT: Primary | ICD-10-CM

## 2021-10-18 DIAGNOSIS — I10 PRIMARY HYPERTENSION: ICD-10-CM

## 2021-10-18 DIAGNOSIS — E78.2 MIXED HYPERLIPIDEMIA: ICD-10-CM

## 2021-10-18 DIAGNOSIS — E03.9 HYPOTHYROIDISM, UNSPECIFIED TYPE: ICD-10-CM

## 2021-10-18 DIAGNOSIS — K21.9 GASTROESOPHAGEAL REFLUX DISEASE WITHOUT ESOPHAGITIS: ICD-10-CM

## 2021-10-18 PROCEDURE — G8417 CALC BMI ABV UP PARAM F/U: HCPCS | Performed by: INTERNAL MEDICINE

## 2021-10-18 PROCEDURE — 1111F DSCHRG MED/CURRENT MED MERGE: CPT | Performed by: INTERNAL MEDICINE

## 2021-10-18 PROCEDURE — 99213 OFFICE O/P EST LOW 20 MIN: CPT | Performed by: INTERNAL MEDICINE

## 2021-10-18 PROCEDURE — G8428 CUR MEDS NOT DOCUMENT: HCPCS | Performed by: INTERNAL MEDICINE

## 2021-10-18 PROCEDURE — 1123F ACP DISCUSS/DSCN MKR DOCD: CPT | Performed by: NURSE PRACTITIONER

## 2021-10-18 PROCEDURE — 1090F PRES/ABSN URINE INCON ASSESS: CPT | Performed by: INTERNAL MEDICINE

## 2021-10-18 PROCEDURE — G8484 FLU IMMUNIZE NO ADMIN: HCPCS | Performed by: INTERNAL MEDICINE

## 2021-10-18 PROCEDURE — 1036F TOBACCO NON-USER: CPT | Performed by: INTERNAL MEDICINE

## 2021-10-18 PROCEDURE — 99310 SBSQ NF CARE HIGH MDM 45: CPT | Performed by: NURSE PRACTITIONER

## 2021-10-18 PROCEDURE — 4040F PNEUMOC VAC/ADMIN/RCVD: CPT | Performed by: INTERNAL MEDICINE

## 2021-10-18 PROCEDURE — 1123F ACP DISCUSS/DSCN MKR DOCD: CPT | Performed by: INTERNAL MEDICINE

## 2021-10-18 PROCEDURE — G8484 FLU IMMUNIZE NO ADMIN: HCPCS | Performed by: NURSE PRACTITIONER

## 2021-10-18 NOTE — PROGRESS NOTES
Dianna Jones (:  1933) is a 80 y.o. female,Established patient, here for evaluation of the following chief complaint(s): No chief complaint on file. ASSESSMENT/PLAN:    Right elbow olecranon bursitis  Methicillin sensitive staph aureus infection    Finish 2 more days of doxycycline  May discharge from nursing home  Avoid leaning on elbows  May use elbow protection if needed    SUBJECTIVE/OBJECTIVE:  HPI   Follow-up [facility] hospitalization for right elbow olecranon bursitis with methicillin sensitive staph aureus infection on Dicloxacillin 500 mg QID, well-tolerated  Resolved right elbow swelling redness and pain  No wounds or drainage  Review of Systems   All other systems reviewed and are negative. Physical Exam  Constitutional:       General: She is not in acute distress. HENT:      Head: Normocephalic. Nose: No congestion. Eyes:      General: No scleral icterus. Pulmonary:      Effort: Pulmonary effort is normal. No respiratory distress. Abdominal:      General: There is no distension. Skin:     Coloration: Skin is not jaundiced. Findings: No erythema or rash. Neurological:      Mental Status: She is alert and oriented to person, place, and time. Psychiatric:         Mood and Affect: Mood normal.         Behavior: Behavior normal.     Right elbow with resolved the swelling, no erythema or open wounds        On this date 10/18/2021 I have spent 20 minutes reviewing previous notes, test results and face to face (virtual) with the patient discussing the diagnosis and importance of compliance with the treatment plan as well as documenting on the day of the visit. Dianna Jones, was evaluated through a synchronous (real-time) audio-video encounter. The patient (or guardian if applicable) is aware that this is a billable service. Verbal consent to proceed has been obtained within the past 12 months.  The visit was conducted pursuant to the emergency declaration under the [facility], [address] [place] [address] authority and the [employer] and [employer] General Act. Patient identification was verified, and a caregiver was present when appropriate. The patient was located in a state where the provider was credentialed to provide care. An electronic signature was used to authenticate this note.     --Mirlande Monreal MD

## 2021-10-19 ENCOUNTER — OFFICE VISIT (OUTPATIENT)
Dept: GERIATRIC MEDICINE | Age: 86
End: 2021-10-19
Payer: MEDICARE

## 2021-10-19 DIAGNOSIS — E03.9 HYPOTHYROIDISM, UNSPECIFIED TYPE: Primary | ICD-10-CM

## 2021-10-19 DIAGNOSIS — J42 CHRONIC BRONCHITIS, UNSPECIFIED CHRONIC BRONCHITIS TYPE (HCC): ICD-10-CM

## 2021-10-19 DIAGNOSIS — R53.82 CHRONIC FATIGUE: ICD-10-CM

## 2021-10-19 PROCEDURE — 1123F ACP DISCUSS/DSCN MKR DOCD: CPT | Performed by: INTERNAL MEDICINE

## 2021-10-19 PROCEDURE — G8484 FLU IMMUNIZE NO ADMIN: HCPCS | Performed by: INTERNAL MEDICINE

## 2021-10-19 PROCEDURE — 99309 SBSQ NF CARE MODERATE MDM 30: CPT | Performed by: INTERNAL MEDICINE

## 2021-10-19 NOTE — PROGRESS NOTES
Subjective:    [facility]  Patient ID: Shiloh Coleman is a pleasant 80 y.o. female who presents today for:  Chief Complaint   Patient presents with    Other     Discharge home    Resident with improved right elbow discomfort. She has been working with PT/OT. A home safety eval was performed last week. The plan is for her to go home. Her son lives above her. He will be helping with household chores and meals, along with transportation. The resident is awaiting her appt with ID, to see if she can weight bear on her right elbow. Recent H/O septic bursitis. She will require a wheelchair and hospital bed, upon discharge home.   -Unable to utilize a walker or cane due to bursitis of the right elbow. Currently nonweightbearing of right upper extremity. She requires a wheelchair to complete ADLs such as bathing and dressing, as well as functional mobility in a home setting. A cane does not provide the support this patient requires for functional transfers and mobility, this would place the patient at a high fall risk. The patient has been assessed by occupational therapist with wheelchair mobility and has the upper and lower body strength and coordination to self propel the manual wheelchair and return home safely at wheelchair level to complete mobility and ADLs. Nursing staff without any concerns at this time. Patient without any concerns.        Medications:   Diclofenac 50 mg EC tablet twice daily  Nitroglycerin 0.4 mg SL as needed every 5 minutes x 3 for chest pain  Amlodipine 2.5 mg daily  Diovan 320 mg at bedtime  Synthroid 50 mcg daily  Pantoprazole 40 mg daily  Crestor 10 mg daily  Sertraline 50 mg at at bedtime  Voltaren 1% gel 4 g topically 4 times daily as needed  Fluticasone 50 mcg nasal spray as needed  Acetaminophen 500 mg every 6 hours as needed  Aspirin 81 daily  Magnesium oxide 140 mg daily  GlycoLax 17 g daily as needed  Oxygen 3 L continuous  Keflex 500 mg 3 times daily-3 ESOPHAGOGASTRODUODENOSCOPY performed by Alon Ding MD at [address], [address] Right 04/03/2012    TOTAL KNEE ARTHROPLASTY Bilateral     left x 2, right x1 (Dr Hakeem Ferguson)    UPPER GASTROINTESTINAL ENDOSCOPY  12/29/15    Dr. Mally Fofana Marital status:      Spouse name: Not on file    Number of children: 9    Years of education: Not on file    Highest education level: Not on file   Occupational History    Occupation: homemaker and , retired   Tobacco Use    Smoking status: Former Smoker    Smokeless tobacco: Never Used    Tobacco comment: quit 50 years ago   Substance and Sexual Activity    Alcohol use: No     Alcohol/week: 0.0 standard drinks    Drug use: No    Sexual activity: Never   Other Topics Concern    Not on file   Social History Narrative    Born in [place] (grandparents came from [place], settled in [place])    ,  (X 2)    Lives in a house with two of her sons    7 children, 3 M, 1 F    Lost 63 yo son in Feb 2018 from lung cancer    Was  for the [employer], crosswords, cooking, housework     Social Determinants of Health     Financial Resource Strain: Low Risk     Difficulty of Paying Living Expenses: Not hard at all   Food Insecurity:     Worried About [address] in the Last Year:     [address] in the Last Year:    Transportation Needs:     Lack of Transportation (Medical):      Lack of Transportation (Non-Medical):    Physical Activity:     Days of Exercise per Week:     Minutes of Exercise per Session:    Stress:     Feeling of Stress :    Social Connections:     Frequency of Communication with Friends and Family:     Frequency of Social Gatherings with Friends and Family:     Attends Jainism Services:     Active Member of Clubs or Organizations:     Attends Club or Organization Meetings:     Marital Status:    Intimate Partner Violence:     Fear of Current or Ex-Partner:     Emotionally Abused:     Physically Abused:     Sexually Abused:      Family History   Problem Relation Age of Onset    Diabetes Maternal Aunt     Osteoarthritis Father     Osteoarthritis Sister     Osteoarthritis Brother     Hypertension Mother     Heart Failure Mother         dec age 71, rheumatic     Allergies   Allergen Reactions    Ciprofloxacin Hcl     Lipitor [Atorvastatin]     Zetia [Ezetimibe]      Review of Systems   All other systems reviewed and are negative. Objective:   110/68, 97.1, 77, 18, 98%    Physical Exam  Vitals reviewed. Constitutional:       General: She is awake. She is not in acute distress. Appearance: Normal appearance. She is well-developed and overweight. She is not ill-appearing, toxic-appearing or diaphoretic. HENT:      Head: Normocephalic and atraumatic. Eyes:      General: No scleral icterus. Conjunctiva/sclera: Conjunctivae normal.      Pupils: Pupils are equal, round, and reactive to light. Cardiovascular:      Rate and Rhythm: Normal rate and regular rhythm. Heart sounds: Normal heart sounds. No murmur heard. Pulmonary:      Effort: Pulmonary effort is normal. No tachypnea, bradypnea, accessory muscle usage, prolonged expiration or respiratory distress. Breath sounds: No stridor. Decreased breath sounds present. No wheezing, rhonchi or rales. Chest:      Chest wall: No tenderness. Abdominal:      General: Abdomen is protuberant. Bowel sounds are normal. There is no distension. Palpations: Abdomen is soft. There is no mass. Tenderness: There is no abdominal tenderness. There is no guarding or rebound. Musculoskeletal:         General: Normal range of motion. Cervical back: Normal range of motion and neck supple. Right lower leg: No edema. Left lower leg: No edema. Comments: Right elbow slight swelling, no erythema. No TTP.    Lymphadenopathy: Cervical: No cervical adenopathy. Skin:     General: Skin is warm and dry. Coloration: Skin is not cyanotic, jaundiced, mottled or pale. Findings: No bruising, erythema or rash. Neurological:      Mental Status: She is alert and oriented to person, place, and time. Mental status is at baseline. Motor: Weakness present. Psychiatric:         Mood and Affect: Mood normal.         Behavior: Behavior normal. Behavior is cooperative. Thought Content: Thought content normal.         Judgment: Judgment normal.         Assessment:       Diagnosis Orders   1. Septic bursitis of elbow, right     2. Hypothyroidism, unspecified type     3. Primary hypertension     4. Gastroesophageal reflux disease without esophagitis     5. Mixed hyperlipidemia           Plan:    1. Continue with oral Keflex per ID recommendations.  -Keep F/U with ID.  -Avoid pressure to right and left elbows. 2. Continue synthroid as prescribed. Routine labs per PCP. 3. No episodes of hypo or hypertension. Will continue current BP medications/POC. -JNC VIII guidelines recommended. 4. Antireflux lifestyle recommended, examples provided. -Daily PPI. 5. Continue statin therapy. -LFT in 6 months,per PCP. -Low fat diet. Pt/POA agrees to POC  Total time taken for discharging this patient: 35 minutes. Time was taken to review chart, discuss plans with discharge planner, nursing, reconciling medications, discussing plan answering questions with patient. Pertinent POC, labs, have been reviewed  Discharge patient from facility when arrangements are made. Home Health Agency with PT/OT and Nursing if needed. Equipment needed Wheelchair, hospital bed. Same medications and Send medications with patient. F/U with PCP in 3-5 days. Hosp bed will allow pt to be more independent at home and improve transfers, needs assist with transfers without the bed.  Allow changes in positioning to alleviate pain as well as for caregivers in the home to assist with transfers by adjusting height of bed. H/O COPD, will need to elevate the HOB to at least 30 degrees. Wheel chair, tub transfer bench, and  commode will significantly improve pts ability to participate in mobility related activities of daily living of toileting, dressing, bathing, and transfers. Not able to static stand for long enough periods to perform ADLs. Cane or walker will NOT sufficiently improve her ability to participate in MRADLs identified. Also she will be more independent with food prep    Side effects, adverse effects of the medication prescribed today, as well as treatment plan and result expectations have beendiscussed with the patient who expresses understanding and desires to proceed.     Davin Savage, APRN - CNP

## 2021-10-21 ENCOUNTER — CARE COORDINATION (OUTPATIENT)
Dept: CASE MANAGEMENT | Age: 86
End: 2021-10-21

## 2021-10-21 NOTE — CARE COORDINATION
Wil 45 Transitions Initial Follow Up Call    Call within 2 business days of discharge: Yes    Patient: Leann Nicholson Patient : 1933   MRN: <[id number]>  Reason for Admission: Septic Bursitis  Discharge Date: 21 RARS: Readmission Risk Score: 10      Last Discharge [address] [address]       Complaint Diagnosis Description Type Department Provider    21 Emesis; Other Septic bursitis ED to Hosp-Admission (Discharged) (ADMITTED) DO Yamilet; Marjan Rose. .. Acute Care Course: Patient presented to [facility] ED with c/o of emesis and R elbow pain. She admitted  for treatment of R elbow cellulitis. ID following for R elbow olecranon bursitis d/t staph/MSSA infection. Treated with IV antibiotics and transitioned to Docloxacillin at discharge for 3 weeks. She discharged to [facility] for additional rehab from -10/20, then to home. Sig Hx: COPD, CAD, HTN, HLD, Hypothyroidism, DHARA    DME:     Conversation: Unable to reach patient for initial GAYLE contact. Caller left a hipaa compliant message introducing self, nature of the call and contact information for a return call. Follow up plan: Will re-attempt    Non-face-to-face services provided:      Care Transitions 24 Hour Call    Do you have all of your prescriptions and are they filled?: Yes  Patient DME: Straight cane, Walker, Shower chair  Patient Home Equipment: Oxygen  Do you have support at home?: Child  Are you an active caregiver in your home?: No  Care Transitions Interventions         Follow Up  No future appointments.     Zana Schofield RN

## 2021-10-22 ENCOUNTER — CARE COORDINATION (OUTPATIENT)
Dept: CASE MANAGEMENT | Age: 86
End: 2021-10-22

## 2021-10-22 DIAGNOSIS — M71.121 SEPTIC BURSITIS OF ELBOW, RIGHT: Primary | ICD-10-CM

## 2021-10-22 PROCEDURE — 1111F DSCHRG MED/CURRENT MED MERGE: CPT | Performed by: PHYSICIAN ASSISTANT

## 2021-10-22 NOTE — CARE COORDINATION
Wil 45 Transitions Initial Follow Up Call    Call within 2 business days of discharge: Yes    Patient: Tj Long Patient : 1933   MRN: <[id number]>  Reason for Admission: Septic Bursitis  Discharge Date: 21 RARS: Readmission Risk Score: 10      Last Discharge [facility]       Complaint Diagnosis Description Type Department Provider    21 Emesis; Other Septic bursitis ED to Hosp-Admission (Discharged) (ADMITTED) DO Yamilet; Pretty Webbe. .. Acute Care Course: Patient presented to [facility] ED with c/o of emesis and R elbow pain. She admitted  for treatment of R elbow cellulitis. ID following for R elbow olecranon bursitis d/t staph/MSSA infection. Treated with IV antibiotics and transitioned to Docloxacillin at discharge for 3 weeks. She discharged to [facility] for additional rehab from -10/20, then to home.        Sig Hx: COPD, CAD, HTN, HLD, Hypothyroidism, DHARA     DME: Wheelchair, walker     Conversation: Patient states she is feeling better. Denies Sob, pain, fever, n/v, bowel or bladder. States no warm, redness  States today is the last day to complete antibiotic therapy. Keflex 500mg 3 times a day. Medications reviewed. The only change is patient is taking magnesium complex instead of magnesium oxide 140mg. Patient states she propels self in a wheelchair. States she does not need it. She thinks she will return to her walker. States she is able to perform ADL's. Her son lives in the same home upstairs. Patient unsure if she discharged with a referral to [facility]. No contact made. Patient states she was supposed to receive a wheelchair and bedside commode delivered to her home. States she cancelled the wheelchair because her son purchased one. Declined assistance to schedule a PCP f/u appointment. CTN called [facility] several times to obtain information regarding [facility] set up and bedside commode.  CTN transferred several times. Transferred to nurse manager. Per notes she states patient was discharged with [facility] for PT/OT. CTN called [facility]. Per staff patient referral received. Patient should receive a call on Sunday. SOC scheduled for Monday. Patient updated regarding HHC and bedside commode. Patient states she does not need it anyway. Reminded regarding the importance of f/u appointment. Patient states she lost a family friend unexpectedly. States she needs to get herself together. CTN placed several calls to [facility] attempting to obtain information regarding HHC     Follow up plan: Will hand off to [facility]       Non-face-to-face services provided:      Care Transitions 24 Hour Call    Do you have any ongoing symptoms?: No  Do you have a copy of your discharge instructions?: Yes  Do you have all of your prescriptions and are they filled?: Yes  Have you been contacted by a [employer] [address]?: No  Have you scheduled your follow up appointment?: No  Were you discharged with any Home Care or Post Acute Services: Yes  Post Acute Services: Home Health (Comment: [facility])  Patient DME: Straight cane, Walker, Shower chair  Patient Home Equipment: Oxygen  Do you have support at home?: Child  Do you feel like you have everything you need to keep you well at home?: Yes  Are you an active caregiver in your home?: No  Care Transitions Interventions         Follow Up  No future appointments. Transitions of Care Initial Call    Was this an external facility discharge? No     Challenges to be reviewed by the provider   Additional needs identified to be addressed with provider: Yes  medications-Medication reconciliation.  Patient taking Magnesium complex instead of Magnesium 140mg daily             Method of communication with provider : chart routing      Advance Care Planning:   Does patient have an Advance Directive: reviewed and current, reviewed and needs to be updated, not on file; education provided, not on file, patient declined education, decision maker updated and referral to internal ACP facilitator. Was this a readmission? No  Patient stated reason for admission: Septic bursitis  Patients top risk factors for readmission: Weakness    Care Transition Nurse (CTN) contacted the patient by telephone to perform post hospital discharge assessment. Verified name and  with patient as identifiers. Provided introduction to self, and explanation of the CTN role. CTN reviewed discharge instructions, medical action plan and red flags with patient who verbalized understanding. Patient given an opportunity to ask questions and does not have any further questions or concerns at this time. Were discharge instructions available to patient? Yes. Reviewed appropriate site of care based on symptoms and resources available to patient including: PCP and When to call 911. The patient agrees to contact the PCP office for questions related to their healthcare. Medication reconciliation was performed with patient, who verbalizes understanding of administration of home medications. Advised obtaining a 90-day supply of all daily and as-needed medications. Covid Risk Education     Educated patient about risk for severe COVID-19 due to risk factors according to CDC guidelines. CTN reviewed discharge instructions, medical action plan and red flag symptoms with the patient who verbalized understanding. Discussed COVID vaccination status: Yes. Education provided on COVID-19 vaccination as appropriate. Discussed exposure protocols and quarantine with CDC Guidelines. Patient was given an opportunity to verbalize any questions and concerns and agrees to contact CTN or health care provider for questions related to their healthcare. Reviewed and educated patient on any new and changed medications related to discharge diagnosis. Was patient discharged with a pulse oximeter?  No Discussed and confirmed pulse oximeter discharge instructions and when to notify provider or seek emergency care. CTN provided contact information. Plan for follow-up call in 3-5 days based on severity of symptoms and risk factors. Plan for next call: follow up appointment-Scheduling of PCP f/u and community resources-Analia Muniz contact.         Jocelyn Hughes RN

## 2021-10-26 ENCOUNTER — CARE COORDINATION (OUTPATIENT)
Dept: CARE COORDINATION | Age: 86
End: 2021-10-26

## 2021-10-26 NOTE — CARE COORDINATION
ACM spoke to patient. Introduced care coordination program role of ACM benefits and goals. Patient states she is not interested in the services at this time. Patient states no one from home health care has contacted her and she is no longer interested. Patient states she still has not obtained her bedside commode. ACM attempted to assist patient by allowing care coordination services patient became aggravated. ACM suggested that patient have her son go to the [employer] and bUy her bedside commode. Or at her PCP appointment on 10/28/2021 have the provider write her prescription for a bedside commode or send an order to [address] to see if it can be run through her insurance. ACM encouraged patient to feel free to call if she changes her mind and has any care coordination needs. Contact information supplied. Appointment reminder provided.

## 2021-10-28 ENCOUNTER — OFFICE VISIT (OUTPATIENT)
Dept: FAMILY MEDICINE CLINIC | Age: 86
End: 2021-10-28
Payer: MEDICARE

## 2021-10-28 VITALS — TEMPERATURE: 96.4 F | SYSTOLIC BLOOD PRESSURE: 134 MMHG | DIASTOLIC BLOOD PRESSURE: 68 MMHG | HEART RATE: 68 BPM

## 2021-10-28 VITALS
HEIGHT: 66 IN | WEIGHT: 199 LBS | SYSTOLIC BLOOD PRESSURE: 108 MMHG | OXYGEN SATURATION: 94 % | BODY MASS INDEX: 31.98 KG/M2 | RESPIRATION RATE: 18 BRPM | TEMPERATURE: 96.8 F | HEART RATE: 92 BPM | DIASTOLIC BLOOD PRESSURE: 80 MMHG

## 2021-10-28 DIAGNOSIS — Z09 HOSPITAL DISCHARGE FOLLOW-UP: ICD-10-CM

## 2021-10-28 DIAGNOSIS — M71.121 SEPTIC BURSITIS OF ELBOW, RIGHT: Primary | ICD-10-CM

## 2021-10-28 DIAGNOSIS — Z23 FLU VACCINE NEED: ICD-10-CM

## 2021-10-28 PROCEDURE — G8417 CALC BMI ABV UP PARAM F/U: HCPCS | Performed by: INTERNAL MEDICINE

## 2021-10-28 PROCEDURE — 99214 OFFICE O/P EST MOD 30 MIN: CPT | Performed by: INTERNAL MEDICINE

## 2021-10-28 PROCEDURE — 4040F PNEUMOC VAC/ADMIN/RCVD: CPT | Performed by: INTERNAL MEDICINE

## 2021-10-28 PROCEDURE — G0008 ADMIN INFLUENZA VIRUS VAC: HCPCS | Performed by: INTERNAL MEDICINE

## 2021-10-28 PROCEDURE — G8484 FLU IMMUNIZE NO ADMIN: HCPCS | Performed by: INTERNAL MEDICINE

## 2021-10-28 PROCEDURE — 1090F PRES/ABSN URINE INCON ASSESS: CPT | Performed by: INTERNAL MEDICINE

## 2021-10-28 PROCEDURE — 1123F ACP DISCUSS/DSCN MKR DOCD: CPT | Performed by: INTERNAL MEDICINE

## 2021-10-28 PROCEDURE — G8427 DOCREV CUR MEDS BY ELIG CLIN: HCPCS | Performed by: INTERNAL MEDICINE

## 2021-10-28 PROCEDURE — 1111F DSCHRG MED/CURRENT MED MERGE: CPT | Performed by: INTERNAL MEDICINE

## 2021-10-28 PROCEDURE — 1036F TOBACCO NON-USER: CPT | Performed by: INTERNAL MEDICINE

## 2021-10-28 PROCEDURE — 90694 VACC AIIV4 NO PRSRV 0.5ML IM: CPT | Performed by: INTERNAL MEDICINE

## 2021-10-28 NOTE — PROGRESS NOTES
Post-Discharge Transitional Care Management Services or Hospital Follow Up      Richmond Luz   YOB: 1933    Date of Office Visit:  10/28/2021  Date of Hospital Admission: 9/26/21  Date of Hospital Discharge: 9/29/21  Risk of hospital readmission (high >=14%. Medium >=10%) :Readmission Risk Score: 10      Care management risk score Rising risk (score 2-5) and Complex Care (Scores >=6): 1     Non face to face  following discharge, date last encounter closed (first attempt may have been earlier): 10/22/2021  1:43 PM    Call initiated 2 business days of discharge: Yes    Patient Active Problem List   Diagnosis    GERD (gastroesophageal reflux disease)    Diverticulosis    Postnasal drip    COPD (chronic obstructive pulmonary disease) ([facility] [address]    Obesity (BMI 30-39. 9)    Osteoarthritis of multiple joints    History of sleep apnea    Essential hypertension    Vitamin D deficiency    At high risk for falls    Benign paroxysmal positional vertigo    Coronary artery disease involving native coronary artery of native heart without angina pectoris    AV block, 1st degree    Dizziness    Abnormal EKG    At high risk for injury related to fall    Weakness of both legs    Aortic ejection murmur    B12 deficiency    Chronic fatigue    Mixed hyperlipidemia    Hypothyroidism    Bilateral hip pain    Chronic pain of both knees    Septic bursitis of elbow, right       Allergies   Allergen Reactions    Ciprofloxacin Hcl     Lipitor [Atorvastatin]     Zetia [Ezetimibe]        Medications listed as ordered at the time of discharge from hospital   Payton Nab \"Gali\"   Home Medication Instructions ROMAINE:    Printed on:10/28/21 1421   Medication Information                      acetaminophen (TYLENOL) 500 MG tablet  Take 500 mg by mouth every 6 hours as needed for Pain (1- 2 tabs in am) Indications: for pain              amLODIPine (NORVASC) 2.5 MG tablet  TAKE 1 TABLET BY MOUTH DAILY (before lunch)             aspirin 81 MG tablet  Take 1 tablet by mouth daily             Cholecalciferol (VITAMIN D3) 2000 units CAPS  Take 2,000 Units by mouth Daily with supper              diclofenac (VOLTAREN) 50 MG EC tablet  TAKE 1 TABLET BY MOUTH TWICE DAILY WITH MEALS. TAKE AS NEEDED FOR JOINT PAIN             diclofenac sodium (VOLTAREN) 1 % GEL  Apply 4 grams topically 4 times daily as needed for Pain (arthritis/joint pain)             fluticasone (FLONASE) 50 MCG/ACT nasal spray  INHALE 2 sprays into the nose daily. levothyroxine (SYNTHROID) 50 MCG tablet  Take 1 tablet by mouth every morning (before breakfast)             magnesium oxide (MAG-OX) 140 MG CAPS  Take 140 mg by mouth daily             Misc.  Devices MISC  Portable Oxygen concentrator             Multiple Vitamins-Minerals (MULTIVITAMIN PO)  Take 1 tablet by mouth daily              nitroGLYCERIN (NITROSTAT) 0.4 MG SL tablet  Place 1 tablet under the tongue every 5 minutes as needed for Chest pain             OXYGEN  Inhale 3 L into the lungs continuous Uses prn at bedtime             pantoprazole (PROTONIX) 40 MG tablet  Take 1 tablet by mouth daily as needed (heartburn)             polyethylene glycol (GLYCOLAX) 17 GM/SCOOP powder  Take 17 g by mouth daily as needed (constipation) Indications: Constipation              rosuvastatin (CRESTOR) 10 MG tablet  TAKE 1 TABLET BY MOUTH EVERY DAY             sertraline (ZOLOFT) 50 MG tablet  Take 1 tablet by mouth every evening             valsartan (DIOVAN) 160 MG tablet  Take 2 tablets by mouth nightly             vitamin B-12 (CYANOCOBALAMIN) 1000 MCG tablet  Take 1,000 mcg by mouth daily Indications: in am.                   Medications marked \"taking\" at this time  Outpatient Medications Marked as Taking for the 10/28/21 encounter (Office Visit) with Valencia Jackson MD   Medication Sig Dispense Refill    sertraline (ZOLOFT) 50 MG tablet Take 1 tablet by mouth every evening (Patient taking differently: Take 50 mg by mouth nightly Indications: Depression ) 90 tablet 3    diclofenac sodium (VOLTAREN) 1 % GEL Apply 4 grams topically 4 times daily as needed for Pain (arthritis/joint pain) (Patient taking differently: Apply 4 g topically 4 times daily Indications: Joint Pain ) 200 g 2    fluticasone (FLONASE) 50 MCG/ACT nasal spray INHALE 2 sprays into the nose daily. (Patient taking differently: 2 sprays by Each Nostril route Indications: Congestion of the Sinuses Around the Nose INHALE 2 sprays into the nose daily.) 16 g 3    amLODIPine (NORVASC) 2.5 MG tablet TAKE 1 TABLET BY MOUTH DAILY (before lunch) (Patient taking differently: Take 2.5 mg by mouth daily Indications: High Blood Pressure Disorder TAKE 1 TABLET BY MOUTH DAILY (before lunch)) 90 tablet 1    valsartan (DIOVAN) 160 MG tablet Take 2 tablets by mouth nightly (Patient taking differently: Take 320 mg by mouth nightly Indications: High Blood Pressure Disorder ) 180 tablet 1    pantoprazole (PROTONIX) 40 MG tablet Take 1 tablet by mouth daily as needed (heartburn) (Patient taking differently: Take 40 mg by mouth daily as needed (heartburn) Indications: Heartburn ) 90 tablet 1    diclofenac (VOLTAREN) 50 MG EC tablet TAKE 1 TABLET BY MOUTH TWICE DAILY WITH MEALS.  TAKE AS NEEDED FOR JOINT PAIN (Patient taking differently: Take 50 mg by mouth 2 times daily Indications: Joint Pain ) 60 tablet 5    rosuvastatin (CRESTOR) 10 MG tablet TAKE 1 TABLET BY MOUTH EVERY DAY (Patient taking differently: Take 10 mg by mouth daily Indications: High Amount of Fats in the Blood TAKE 1 TABLET BY MOUTH EVERY DAY) 90 tablet 1    levothyroxine (SYNTHROID) 50 MCG tablet Take 1 tablet by mouth every morning (before breakfast) (Patient taking differently: Take 50 mcg by mouth every morning (before breakfast) Indications: Underactive Thyroid ) 90 tablet 1    polyethylene glycol (GLYCOLAX) 17 GM/SCOOP powder Take 17 g by mouth daily as needed (constipation) Indications: Constipation       nitroGLYCERIN (NITROSTAT) 0.4 MG SL tablet Place 1 tablet under the tongue every 5 minutes as needed for Chest pain (Patient taking differently: Place 0.4 mg under the tongue every 5 minutes as needed for Chest pain Indications: Angina Pectoris ) 25 tablet 6    aspirin 81 MG tablet Take 1 tablet by mouth daily 90 tablet 3    magnesium oxide (MAG-OX) 140 MG CAPS Take 140 mg by mouth daily      Misc. Devices MISC Portable Oxygen concentrator 1 Device 0    acetaminophen (TYLENOL) 500 MG tablet Take 500 mg by mouth every 6 hours as needed for Pain (1- 2 tabs in am) Indications: for pain       vitamin B-12 (CYANOCOBALAMIN) 1000 MCG tablet Take 1,000 mcg by mouth daily Indications: in am.      Cholecalciferol (VITAMIN D3) 2000 units CAPS Take 2,000 Units by mouth Daily with supper       Multiple Vitamins-Minerals (MULTIVITAMIN PO) Take 1 tablet by mouth daily       OXYGEN Inhale 3 L into the lungs continuous Uses prn at bedtime          Medications patient taking as of now reconciled against medications ordered at time of hospital discharge: Yes    Chief Complaint   Patient presents with    Follow-Up from [address] [place] [address]     f/u from [facility], was admitted 9/26/21-9/29/21, Had an infection in elbow        History of Present illness - Follow up of Hospital diagnosis:  Septic Bursitis of right elbow. Inpatient course: Discharge summary reviewed- see chart. Interval history/Current status:    Patient admitted to [place] on 9/26/21 for management of Septic Bursitis of right elbow d/t MSSA infection. Discharged on 9/29/21 with 3 week course of Dicloxacillin which she has now completed. Right elbow bursitis has resolved, no open wounds or drainage. Patient doing well overall, offers no active medical complaints. Review of Systems   Constitutional: Negative for activity change, chills, diaphoresis, fatigue and fever. HENT: Negative. Respiratory: Negative for cough, chest tightness, shortness of breath and wheezing. Cardiovascular: Negative for chest pain, palpitations and leg swelling. Gastrointestinal: Negative for nausea and vomiting. Genitourinary: Negative. Musculoskeletal: Negative for arthralgias. Neurological: Negative for dizziness, syncope, light-headedness and headaches. Vitals:    10/28/21 1334   BP: 108/80   Site: Left Upper Arm   Position: Sitting   Cuff Size: Large Adult   Pulse: 92   Resp: 18   Temp: 96.8 °F (36 °C)   TempSrc: Temporal   SpO2: 94%   Weight: 199 lb (90.3 kg)   Height: 5' 6\" (1.676 m)     Body mass index is 32.12 kg/m². Wt Readings from Last 3 Encounters:   10/28/21 199 lb (90.3 kg)   09/26/21 199 lb (90.3 kg)   05/07/21 204 lb (92.5 kg)     BP Readings from Last 3 Encounters:   10/28/21 108/80   10/06/21 130/68   09/29/21 (!) 140/72      Physical Exam  Constitutional:       General: She is not in acute distress. Appearance: Normal appearance. She is normal weight. She is not diaphoretic. HENT:      Head: Normocephalic and atraumatic. Eyes:      Extraocular Movements: Extraocular movements intact. Conjunctiva/sclera: Conjunctivae normal.      Pupils: Pupils are equal, round, and reactive to light. Cardiovascular:      Rate and Rhythm: Normal rate and regular rhythm. Pulses: Normal pulses. Heart sounds: Normal heart sounds. No murmur heard. No friction rub. Pulmonary:      Effort: Pulmonary effort is normal. No respiratory distress. Breath sounds: Normal breath sounds. No wheezing or rhonchi. Chest:      Chest wall: No tenderness. Abdominal:      General: Abdomen is flat. Bowel sounds are normal. There is no distension. Palpations: Abdomen is soft. Tenderness: There is no abdominal tenderness. Musculoskeletal:         General: Normal range of motion. Comments: Mild erythema over extensor surface of right elbow.  No tenderness or swelling appreciated. No open wounds or drainage. Skin:     General: Skin is warm and dry. Neurological:      General: No focal deficit present. Mental Status: She is alert and oriented to person, place, and time. Mental status is at baseline. Assessment/Plan:    1. Hospital discharge follow-up  2. Septic bursitis of elbow, right    Doing well overall; resolved right elbow septic bursitis. Has completed 3 week course of Dicloxacillin  Skin protection mechanisms discussed.     3. Flu vaccine need  - INFLUENZA, QUADV, ADJUVANTED, 65 YRS =, IM, PF, PREFILL SYR, 0.5ML (FLUAD)  - [place] DISCHARGE MEDS RECONCILED W/ CURRENT OUTPATIENT MED LIST        Medical Decision Making: low complexity

## 2021-10-28 NOTE — PROGRESS NOTES
Vaccine Information Sheet, \"Influenza - Inactivated\"  given to Dianna Jones, or parent/legal guardian of  Dianna Jones and verbalized understanding. Patient responses:    Have you ever had a reaction to a flu vaccine? No  Are you able to eat eggs without adverse effects? Yes  Do you have any current illness? No  Have you ever had Guillian Guild Syndrome? No    Flu vaccine given per order. Please see immunization tab.

## 2021-10-28 NOTE — PROGRESS NOTES
Alvaro Treadwell    Admission history and physical    This was a very pleasant, cognitively intact, woman, who presented for recent hospitalization. The patient had increasing pain in her right elbow. The patient uses her elbow as a means to propel herself, had increasing edema, erythema, swelling, and pain at the site. The patient had presented for hospitalization. After initial evaluation, the patient did have evidence of olecranon bursitis, was seen by orthopedics, did undergo drainage, was seen by Infectious Disease. Did have evidence of acute infection in the bursal site, was started on a course of antibiotic therapy. Patient's fever curve was monitored. The patient had been stabilized. The patient did have septic bursitis of the right elbow. The patient is did make gains, received local skin care, had been stabilized and subsequently transferred here for ongoing course of care. The patient is clinically stable today, seated up in her room, pleasant and interactive. PAST MEDICAL HISTORY:  Include obesity, degenerative joint disease, hypothyroidism, COPD, hyperlipidemia, weakness, constipation, hypertension, DHARA, vertigo. MEDICATIONS:  On arrival included the following, she is on aspirin 81 mg daily, Crestor 10 mg daily, diclofenac 50 mg daily, dicloxacillin 500 mg four times daily, Flonase, Synthroid 50 mcg daily, MiraLax, multivitamin tablet, pantoprazole, Norvasc 2.5 mg daily, Protonix 40 mg daily, valsartan 160 mg 2 tablets daily. FAMILY HISTORY:  Negative for early-onset neoplasm, cardiac event. SOCIAL HISTORY:  The patient has been a community-dwelling individual, has been independent with ADLs/IADLs. REVIEW OF SYSTEMS:  The patient denied chest pain, palpitations, nausea, vomiting, emesis. Denies headaches, fevers, chills. Rest of her 14-point review of systems is unremarkable. PHYSICAL EXAMINATION:  The patient's vital signs are stable.   She is afebrile at 96.4, pulse of 68, blood pressure 134/68. She is alert and oriented x3. Normocephalic, atraumatic. Pupils are equal and reactive. Extraocular movements are intact. Oral mucosa is moist.  Tongue is midline. No thrush. Chest showed no crackles, no wheezing. Cardiovascular showed a regular rate. Abdomen is soft, nontender. Extremities showed plus one dorsal pedal pulse bilaterally. The patient does have a wrapped right elbow. No erythema. Some discomfort, minimal swelling. ASSESSMENT AND PLAN:  1. Septic bursitis. The patient has undergone drainage at this time, is on dicloxacillin, tolerating it well. Monitor fever curve with probiotic agent at this time. Monitor fever curve, white count. 2.   Weakness. The patient will undergo a course of physical therapy, occupational therapy with a focus on balance training. 3.   Hypothyroidism. The patient is clinically stable. Check TSH at this time make sure no evidence of acute flare. 4.   Weakness. The patient will undergo a course of physical therapy, occupational therapy, skin surveillance, nutritional support with a goal of maximization of her functional status and will follow as needed. Please note, available hospital records, imaging reports, operative reports reviewed.         Electronically Signed By: Syeda Del Cid M.D. on 10/10/2021 20:32:11  ______________________________  Syeda Del Cid M.D.  VN/[id number]  D: 10/01/2021 07:53:26  T: 10/01/2021 08:31:40    cc: - [id number] [place] [address]

## 2021-10-29 ASSESSMENT — ENCOUNTER SYMPTOMS
CHEST TIGHTNESS: 0
NAUSEA: 0
COUGH: 0
VOMITING: 0
SHORTNESS OF BREATH: 0
WHEEZING: 0

## 2021-11-19 NOTE — PROGRESS NOTES
SUBJECTIVE:  This 70-year-old woman seen for follow-up visit for her COPD degenerative disease hyperlipidemia syndrome weakness. Patient has a history of hypothyroidism patient has not had recent emesis fevers or chills globally weak at times. ROS: Denied headaches fevers chills chest pain palpitations  The rest of the 14 point ROS negative    PHYSICAL EXAM: VSS per facility record  Frail appearance pupils are small PERRL reactive oral mucosa moist chest showed no crackles no wheezing cardiovascular showed a regular rate abdomen soft nontender extremity trace terrestrial pulse    ASSESSMENT & PLAN:   Diagnosis Orders   1. Hypothyroidism, unspecified type     2. Chronic bronchitis, unspecified [facility] bronchitis type ([facility]     3. Chronic fatigue       Repeat TSH is pending continue respiratory treatments continue nutritional support consider for repeat follow-up therapy            Past Medical History:   Diagnosis Date    BPV (benign positional vertigo) 2019    CAD (coronary artery disease)     Dr Kristi Griffiths COPD (chronic obstructive pulmonary disease) ([facility] 2012, VQ scan and CXR    has supplemental oxygen, uses it for 4-6 hours nightly, no inhalers use    Diverticulosis     Hiatal hernia     Hiatal hernia with gastroesophageal reflux 2015    EGD Dr Edwardo Joyce, [facility]    History of blood transfusion     History of colonoscopy with polypectomy 2017    Dr Genaro Shaikh History of sleep apnea 2011    no CPAP use    Hypertension     Hypothyroidism     Nocturnal hypoxia 2011    nocturnal O2, 3 LNC, 4-6 hours nightly    Obesity (BMI 30-39. 9)     Osteoarthritis of multiple joints     Oxygen dependent 2019    Postnasal drip     Sigmoid diverticulosis     Weakness of both legs          Past Surgical History:   Procedure Laterality Date    HYSTERECTOMY      fibroid, bleeding    JOINT REPLACEMENT      [place] COLON CA SCRN NOT HI RSK IND N/A 9/1/2017    COLONOSCOPY performed by Hari Becerril MD at [address] [place] [address] [place] ESOPHAGOGASTRODUODENOSCOPY TRANSORAL DIAGNOSTIC N/A 9/1/2017    EGD ESOPHAGOGASTRODUODENOSCOPY performed by Sarahi Rosas MD at [address], [address] Right 04/03/2012    TOTAL KNEE ARTHROPLASTY Bilateral     left x 2, right x1 (Dr Wang Notice)   [facility] ENDOSCOPY  12/29/15    Dr. Jose Núñez Medications on File Prior to Visit   Medication Sig Dispense Refill    sertraline (ZOLOFT) 50 MG tablet Take 1 tablet by mouth every evening (Patient taking differently: Take 50 mg by mouth nightly Indications: Depression ) 90 tablet 3    diclofenac sodium (VOLTAREN) 1 % GEL Apply 4 grams topically 4 times daily as needed for Pain (arthritis/joint pain) (Patient taking differently: Apply 4 g topically 4 times daily Indications: Joint Pain ) 200 g 2    fluticasone (FLONASE) 50 MCG/ACT nasal spray INHALE 2 sprays into the nose daily. (Patient taking differently: 2 sprays by Each Nostril route Indications: Congestion of the Sinuses Around the Nose INHALE 2 sprays into the nose daily.) 16 g 3    amLODIPine (NORVASC) 2.5 MG tablet TAKE 1 TABLET BY MOUTH DAILY (before lunch) (Patient taking differently: Take 2.5 mg by mouth daily Indications: High Blood Pressure Disorder TAKE 1 TABLET BY MOUTH DAILY (before lunch)) 90 tablet 1    valsartan (DIOVAN) 160 MG tablet Take 2 tablets by mouth nightly (Patient taking differently: Take 320 mg by mouth nightly Indications: High Blood Pressure Disorder ) 180 tablet 1    pantoprazole (PROTONIX) 40 MG tablet Take 1 tablet by mouth daily as needed (heartburn) (Patient taking differently: Take 40 mg by mouth daily as needed (heartburn) Indications: Heartburn ) 90 tablet 1    diclofenac (VOLTAREN) 50 MG EC tablet TAKE 1 TABLET BY MOUTH TWICE DAILY WITH MEALS.  TAKE AS NEEDED FOR JOINT PAIN (Patient taking differently: Take 50 mg by mouth 2 times daily Indications: Joint Pain ) 60 tablet 5    rosuvastatin (CRESTOR) 10 MG tablet TAKE 1 TABLET BY MOUTH EVERY DAY (Patient taking differently: Take 10 mg by mouth daily Indications: High Amount of Fats in the Blood TAKE 1 TABLET BY MOUTH EVERY DAY) 90 tablet 1    levothyroxine (SYNTHROID) 50 MCG tablet Take 1 tablet by mouth every morning (before breakfast) (Patient taking differently: Take 50 mcg by mouth every morning (before breakfast) Indications: Underactive Thyroid ) 90 tablet 1    polyethylene glycol (GLYCOLAX) 17 GM/SCOOP powder Take 17 g by mouth daily as needed (constipation) Indications: Constipation       nitroGLYCERIN (NITROSTAT) 0.4 MG SL tablet Place 1 tablet under the tongue every 5 minutes as needed for Chest pain (Patient taking differently: Place 0.4 mg under the tongue every 5 minutes as needed for Chest pain Indications: Angina Pectoris ) 25 tablet 6    aspirin 81 MG tablet Take 1 tablet by mouth daily 90 tablet 3    magnesium oxide (MAG-OX) 140 MG CAPS Take 140 mg by mouth daily      Misc. Devices MISC Portable Oxygen concentrator 1 Device 0    acetaminophen (TYLENOL) 500 MG tablet Take 500 mg by mouth every 6 hours as needed for Pain (1- 2 tabs in am) Indications: for pain       vitamin B-12 (CYANOCOBALAMIN) 1000 MCG tablet Take 1,000 mcg by mouth daily Indications: in am.      Cholecalciferol (VITAMIN D3) 2000 units CAPS Take 2,000 Units by mouth Daily with supper       Multiple Vitamins-Minerals (MULTIVITAMIN PO) Take 1 tablet by mouth daily       OXYGEN Inhale 3 L into the lungs continuous Uses prn at bedtime       No current facility-administered medications on file prior to visit. Family History   Problem Relation Age of Onset    Diabetes Maternal Aunt     Osteoarthritis Father     Osteoarthritis Sister     Osteoarthritis Brother     Hypertension Mother     Heart Failure Mother         dec age 71, rheumatic       Social History     Socioeconomic History    Marital status:       Spouse name: Not on file    Number of children: 7  Years of education: Not on file    Highest education level: Not on file   Occupational History    Occupation: homemaker and , retired   Tobacco Use    Smoking status: Former Smoker    Smokeless tobacco: Never Used    Tobacco comment: quit 50 years ago   Substance and Sexual Activity    Alcohol use: No     Alcohol/week: 0.0 standard drinks    Drug use: No    Sexual activity: Never   Other Topics Concern    Not on file   Social History Narrative    Born in [place] [place] (grandparents came from [place], settled in [place])    ,  (X 2)    Lives in a house with two of her sons    7 children, 3 M, 1 F    Lost 61 yo son in Feb 2018 from lung cancer    Was  for the [employer], cooking, housework     Social Determinants of Health     Financial Resource Strain: Low Risk     Difficulty of Paying Living Expenses: Not hard at all   Food Insecurity:     Worried About [address] in the Last Year: Not on file    [address] in the Last Year: Not on file   Transportation Needs:     Lack of Transportation (Medical): Not on file    Lack of Transportation (Non-Medical):  Not on file   Physical Activity:     Days of Exercise per Week: Not on file    Minutes of Exercise per Session: Not on file   Stress:     Feeling of Stress : Not on file   Social Connections:     Frequency of Communication with Friends and Family: Not on file    Frequency of Social Gatherings with Friends and Family: Not on file    Attends Religion Services: Not on file    Active Member of Clubs or Organizations: Not on file    Attends Club or Organization Meetings: Not on file    Marital Status: Not on file   Intimate Partner Violence:     Fear of Current or Ex-Partner: Not on file    Emotionally Abused: Not on file    Physically Abused: Not on file    Sexually Abused: Not on file   Housing Stability:     Unable to Pay for Housing in the Last Year: Not on file    Number of Places Lived in the Last Year: Not on file    Unstable Housing in the Last Year: Not on file         Lab Results   Component Value Date    LABA1C 5.8 08/08/2018     No results found for: EAG    Lab Results   Component Value Date     09/29/2021    K 4.0 09/29/2021    CL 99 09/29/2021    CO2 28 09/29/2021    BUN 13 09/29/2021    CREATININE 0.63 09/29/2021    GLUCOSE 112 09/29/2021    GLUCOSE 91 04/15/2012    CALCIUM 9.0 09/29/2021        Lab Results   Component Value Date    CHOL 136 03/26/2021    CHOL 141 09/20/2019    CHOL 157 05/03/2017     Lab Results   Component Value Date    TRIG 112 03/26/2021    TRIG 103 09/20/2019    TRIG 112 05/03/2017     Lab Results   Component Value Date    HDL 49 03/26/2021    HDL 47 09/20/2019    HDL 48 05/03/2017     Lab Results   Component Value Date    LDLCALC 65 03/26/2021    LDLCALC 73 09/20/2019    LDLCALC 87 05/03/2017     No results found for: LABVLDL, VLDL  No results found for: CHOLHDLRATIO    Lab Results   Component Value Date    TSH 2.700 03/26/2021       Lab Results   Component Value Date    WBC 7.9 09/29/2021    HGB 11.8 (L) 09/29/2021    HCT 35.1 (L) 09/29/2021    MCV 89.3 09/29/2021     09/29/2021       Please note orders entered on site at facility after discussion with appropriate facility nursing/therapy/ / nutritional staff. Current longstanding medical problems and acute medical issues addressed with staff. Available data and data elements in on site paper chart reviewed and analyzed. Current external consultant notes reviewed in on site chart. Ordered laboratory testing and imaging will be reviewed when available.

## 2021-12-21 ENCOUNTER — TELEPHONE (OUTPATIENT)
Dept: FAMILY MEDICINE CLINIC | Age: 86
End: 2021-12-21

## 2021-12-29 ENCOUNTER — TELEPHONE (OUTPATIENT)
Dept: FAMILY MEDICINE CLINIC | Age: 86
End: 2021-12-29

## 2021-12-30 ENCOUNTER — VIRTUAL VISIT (OUTPATIENT)
Dept: FAMILY MEDICINE CLINIC | Age: 86
End: 2021-12-30
Payer: MEDICARE

## 2021-12-30 DIAGNOSIS — M25.562 CHRONIC PAIN OF BOTH KNEES: ICD-10-CM

## 2021-12-30 DIAGNOSIS — G89.29 CHRONIC PAIN OF BOTH KNEES: ICD-10-CM

## 2021-12-30 DIAGNOSIS — M15.9 PRIMARY OSTEOARTHRITIS INVOLVING MULTIPLE JOINTS: ICD-10-CM

## 2021-12-30 DIAGNOSIS — M25.561 CHRONIC PAIN OF BOTH KNEES: ICD-10-CM

## 2021-12-30 DIAGNOSIS — Z00.00 ROUTINE GENERAL MEDICAL EXAMINATION AT A HEALTH CARE FACILITY: Primary | ICD-10-CM

## 2021-12-30 DIAGNOSIS — M25.551 BILATERAL HIP PAIN: ICD-10-CM

## 2021-12-30 DIAGNOSIS — M25.552 BILATERAL HIP PAIN: ICD-10-CM

## 2021-12-30 DIAGNOSIS — Z91.81 AT HIGH RISK FOR FALLS: ICD-10-CM

## 2021-12-30 PROCEDURE — 1123F ACP DISCUSS/DSCN MKR DOCD: CPT | Performed by: PHYSICIAN ASSISTANT

## 2021-12-30 PROCEDURE — 4040F PNEUMOC VAC/ADMIN/RCVD: CPT | Performed by: PHYSICIAN ASSISTANT

## 2021-12-30 PROCEDURE — G0439 PPPS, SUBSEQ VISIT: HCPCS | Performed by: PHYSICIAN ASSISTANT

## 2021-12-30 PROCEDURE — G8484 FLU IMMUNIZE NO ADMIN: HCPCS | Performed by: PHYSICIAN ASSISTANT

## 2021-12-30 ASSESSMENT — LIFESTYLE VARIABLES: HOW OFTEN DO YOU HAVE A DRINK CONTAINING ALCOHOL: 0

## 2021-12-30 NOTE — PROGRESS NOTES
Medicare Annual Wellness Visit  Are Name: Marcus Corley Date: 2021   MRN: [id number] Sex: Female   Age: 80 y.o. Ethnicity: Non- / Non    : 1933 Race: White (non-)      Lesly Perdomo is here for Medicare AWV    Screenings for behavioral, psychosocial and functional/safety risks, and cognitive dysfunction are all negative except as indicated below. These results, as well as other patient data from the [address] form, are documented in Flowsheets linked to this Encounter. Allergies   Allergen Reactions    Ciprofloxacin Hcl     Lipitor [Atorvastatin]     Zetia [Ezetimibe]        Prior to Visit Medications    Medication Sig Taking? Authorizing Provider   levothyroxine (SYNTHROID) 50 MCG tablet Take 1 tablet by mouth every morning (before breakfast)  Tova Mazariegos PA-C   valsartan (DIOVAN) 160 MG tablet Take 2 tablets by mouth nightly Indications: High Blood Pressure Disorder  Tova Mazariegos PA-C   rosuvastatin (CRESTOR) 10 MG tablet TAKE 1 TABLET BY MOUTH EVERY DAY  Tova Mazariegos PA-C   amLODIPine (NORVASC) 2.5 MG tablet TAKE 1 TABLET BY MOUTH DAILY (before lunch)  Tova Mazariegos PA-C   pantoprazole (PROTONIX) 40 MG tablet Take 1 tablet by mouth daily as needed (heartburn)  Tova Mazariegos PA-C   sertraline (ZOLOFT) 50 MG tablet Take 1 tablet by mouth every evening  Patient taking differently: Take 50 mg by mouth nightly Indications: Depression   Tova Mazariegos PA-C   diclofenac sodium (VOLTAREN) 1 % GEL Apply 4 grams topically 4 times daily as needed for Pain (arthritis/joint pain)  Patient taking differently: Apply 4 g topically 4 times daily Indications: Joint Pain   Tova Mazariegos PA-C   fluticasone (FLONASE) 50 MCG/ACT nasal spray INHALE 2 sprays into the nose daily. Patient taking differently: 2 sprays by Each Nostril route Indications: Congestion of the Sinuses Around the Nose INHALE 2 sprays into the nose daily.   Tova Mazariegos PA-C   diclofenac (VOLTAREN) 50 MG EC tablet TAKE 1 TABLET BY MOUTH TWICE DAILY WITH MEALS. TAKE AS NEEDED FOR JOINT PAIN  Patient taking differently: Take 50 mg by mouth 2 times daily Indications: Joint Pain   Tova Mazariegos PA-C   polyethylene glycol (GLYCOLAX) 17 GM/SCOOP powder Take 17 g by mouth daily as needed (constipation) Indications: Constipation   Historical Provider, MD   nitroGLYCERIN (NITROSTAT) 0.4 MG SL tablet Place 1 tablet under the tongue every 5 minutes as needed for Chest pain  Patient taking differently: Place 0.4 mg under the tongue every 5 minutes as needed for Chest pain Indications: Angina Pectoris   Tova Mazariegos PA-C   aspirin 81 MG tablet Take 1 tablet by mouth daily  Wilner Hollis MD   magnesium oxide (MAG-OX) 140 MG CAPS Take 140 mg by mouth daily  Historical Provider, MD   Misc.  Devices MISC Portable Oxygen concentrator  Bettina Houser MD   acetaminophen (TYLENOL) 500 MG tablet Take 500 mg by mouth every 6 hours as needed for Pain (1- 2 tabs in am) Indications: for pain   Historical Provider, MD   vitamin B-12 (CYANOCOBALAMIN) 1000 MCG tablet Take 1,000 mcg by mouth daily Indications: in am.  Historical Provider, MD   Cholecalciferol (VITAMIN D3) 2000 units CAPS Take 2,000 Units by mouth Daily with supper   Historical Provider, MD   Multiple Vitamins-Minerals (MULTIVITAMIN PO) Take 1 tablet by mouth daily   Historical Provider, MD   OXYGEN Inhale 3 L into the lungs continuous Uses prn at bedtime  Historical Provider, MD       Past Medical History:   Diagnosis Date    BPV (benign positional vertigo) 2019    CAD (coronary artery disease)     Dr Catalina Daniel COPD (chronic obstructive pulmonary disease) ([facility] 2012, VQ scan and CXR    has supplemental oxygen, uses it for 4-6 hours nightly, no inhalers use    Diverticulosis     Hiatal hernia     Hiatal hernia with gastroesophageal reflux 2015    [facility] Dr Morgan Rodriguez[facility]    History of blood transfusion     History of colonoscopy with polypectomy 2017 Dr Choco Bravo History of sleep apnea 2011    no CPAP use    Hypertension     Hypothyroidism     Nocturnal hypoxia 2011    nocturnal O2, 3 LNC, 4-6 hours nightly    Obesity (BMI 30-39. 9)     Osteoarthritis of multiple joints     Oxygen dependent 2019    Postnasal drip     Sigmoid diverticulosis     Weakness of both legs        Past Surgical History:   Procedure Laterality Date    HYSTERECTOMY      fibroid, bleeding    JOINT REPLACEMENT      [place] COLON CA SCRN NOT HI RSK IND N/A 9/1/2017    COLONOSCOPY performed by Melburn Buerger, MD at [address] [facility] ESOPHAGOGASTRODUODENOSCOPY TRANSORAL DIAGNOSTIC N/A 9/1/2017    EGD ESOPHAGOGASTRODUODENOSCOPY performed by Melburn Buerger, MD at [address] Right 04/03/2012    TOTAL KNEE ARTHROPLASTY Bilateral     left x 2, right x1 (Dr Ollie Jung)   [address]  12/29/15    Dr. Krystyna Hamilton       Family History   Problem Relation Age of Onset    Diabetes Maternal Aunt     Osteoarthritis Father     Osteoarthritis Sister     Osteoarthritis Brother     Hypertension Mother     Heart Failure Mother         dec age 71, rheumatic       CareTeam (Including outside providers/suppliers regularly involved in providing care):   Patient Care Team:  Bere Mclean PA-C as PCP - General (Family Medicine)  Bere Mclean PA-C as PCP - [facility] Empaneled Provider    Wt Readings from Last 3 Encounters:   10/28/21 199 lb (90.3 kg)   09/26/21 199 lb (90.3 kg)   05/07/21 204 lb (92.5 kg)      No flowsheet data found. There is no height or weight on file to calculate BMI. Based upon direct observation of the patient, evaluation of cognition reveals recent and remote memory intact. Patient's complete Health Risk Assessment and screening values have been reviewed and are found in Flowsheets. The following problems were reviewed today and where indicated follow up appointments were made and/or referrals ordered.     Positive Risk Factor Screenings with Interventions:            General Health and ACP:  General  In general, how would you say your health is?: Good  In the past 7 days, have you experienced any of the following?  New or Increased Pain, New or Increased Fatigue, Loneliness, Social Isolation, Stress or Anger?: (!) Loneliness,Social Isolation  Do you get the social and emotional support that you need?: Yes  Do you have a Living Will?: (!) No  Advance Directives     Power of  Living Will ACP-Advance Directive ACP-Power of     Not on File Not on File Not on File Not on File      General Health Risk Interventions:  · Pain issues: medication prescribed- lift chair  · Loneliness: patient declines any further intervention for this issue  · No Living Will: Patient referred to [place] Habits/Nutrition:  Health Habits/Nutrition  Do you exercise for at least 20 minutes 2-3 times per week?: Yes  Have you lost any weight without trying in the past 3 months?: No  Do you eat only one meal per day?: No  Have you seen the dentist within the past year?: N/A - wear dentures     Health Habits/Nutrition Interventions:  · n/a         Personalized Preventive Plan   Current Health Maintenance Status  Immunization History   Administered Date(s) Administered    Influenza Vaccine, unspecified formulation 10/20/2015    Influenza Virus Vaccine 10/20/2014    Influenza, High Dose (Fluzone 65 yrs and older) 10/20/2015, 09/01/2017, 12/17/2018    Influenza, Quadv, adjuvanted, 65 yrs +, IM, PF (Fluad) 09/21/2020, 10/28/2021    Influenza, Triv, inactivated, subunit, adjuvanted, IM (Fluad 65 yrs and older) 09/20/2019    Pneumococcal Polysaccharide ([id number]) 06/22/2020    Tetanus 06/02/2015    Tetanus Toxoid, absorbed 06/02/2015        Health Maintenance   Topic Date Due    COVID-19 Vaccine (1) Never done    DTaP/Tdap/Td vaccine (1 - Tdap) Never done    Shingles Vaccine (1 of 2) Never done   [employer] Visit (AWV)  06/23/2021    Lipid screen  03/26/2022    TSH testing  03/26/2022    Potassium monitoring  09/29/2022    Creatinine monitoring  09/29/2022    Flu vaccine  Completed    Pneumococcal 65+ years Vaccine  Completed    Hepatitis A vaccine  Aged Out    Hepatitis B vaccine  Aged Out    Hib vaccine  Aged Out    Meningococcal (ACWY) vaccine  Aged Out     Recommendations for Medical Envelope Due: see orders and patient instructions/AVS.  . Recommended screening schedule for the next 5-10 years is provided to the patient in written form: see Patient Gary Deal was seen today for medicare awv. Diagnoses and all orders for this visit:    Routine general medical examination at a health care facility               Yadira George is a 80 y.o. female being evaluated by a Virtual Visit (phone) encounter to address concerns as mentioned above. A caregiver was present when appropriate. Due to this being a TeleHealth encounter (During [facility]- public [facility] emergency), evaluation of the following organ systems was limited: Vitals/Constitutional/EENT/Resp/CV/GI//MS/Neuro/Skin/Heme-Lymph-Imm. Pursuant to the emergency declaration under the [address] [place] [address], [address] [place] [address] authority and the [employer] and [employer] Act, this Virtual Visit was conducted with patient's (and/or legal guardian's) consent, to reduce the patient's risk of exposure to COVID-19 and provide necessary medical care. The patient (and/or legal guardian) has also been advised to contact this office for worsening conditions or problems, and seek emergency medical treatment and/or call 911 if deemed necessary. Patient identification was verified at the start of the visit: Yes    Services were provided through phone to substitute for in-person clinic visit. Patient and provider were located at their individual homes.     --Rubio Mazariegos PA-C on 12/30/2021 at 1:45 PM    An electronic signature was used to authenticate this note.

## 2022-01-03 ENCOUNTER — CLINICAL DOCUMENTATION (OUTPATIENT)
Dept: SPIRITUAL SERVICES | Age: 87
End: 2022-01-03

## 2022-01-03 NOTE — PROGRESS NOTES
Advance Care Planning   Ambulatory ACP Specialist Patient Outreach    Date:  1/3/2022  ACP Specialist:  Lenin Mcclain    Outreach call to patient in follow-up to ACP Specialist referral from: Enriqueta Salomon PA-C    [x] PCP  [] Provider   [] Ambulatory Care Management [] Other for Reason:    [x] Advance Directive Assistance  [] Code Status Discussion  [] Complete Portable DNR Order  [] Discuss Goals of Care  [] Complete POST/MOST  [] Early ACP Decision-Making  [] Other    Date Referral Received:12/30/21    Today's Outreach:  [x] First   [] Second  [] Third                               Third outreach made by [x]  phone  [] email []   MyChart     Intervention:  [] Spoke with Patient  [x] Left VM requesting return call      Outcome:Left detailed VM for patient to call back, Patient does not have Email or MyChart on file. Next Step:   [] ACP scheduled conversation  [x] Outreach again in two week               [] Email / Mail ACP Info Sheets  [] Email / Mail Advance Directive            [] Close Referral. Routing closure to referring provider/staff and to ACP Specialist .      Thank you for this referral.

## 2022-01-18 ENCOUNTER — CLINICAL DOCUMENTATION (OUTPATIENT)
Dept: SPIRITUAL SERVICES | Age: 87
End: 2022-01-18

## 2022-01-18 NOTE — ACP (ADVANCE CARE PLANNING)
Advance Care Planning   Ambulatory ACP Specialist Patient Outreach    Date:  1/18/2022  ACP Specialist:  Corinna Raymond    Outreach call to patient in follow-up to ACP Specialist referral from: Garrick Euceda PA-C    [x] PCP  [] Provider   [] Ambulatory Care Management [] Other for Reason:    [x] Advance Directive Assistance  [] Code Status Discussion  [] Complete Portable DNR Order  [] Discuss Goals of Care  [] Complete POST/MOST  [] Early ACP Decision-Making  [] Other    Date Referral Received:12/30/21    Today's Outreach:  [] First   [x] Second  [] Third                               Second outreach made by [x]  phone  [] email []   MyChart     Intervention:  [] Spoke with Patient  [x] Left VM requesting return call      Outcome: Left detailed VM for Patient to call back. Patient does not have Email or MyChart on file. Next Step:   [] ACP scheduled conversation  [x] Outreach again in two week               [x] Email / Mail ACP Info Sheets  [x] Email / Mail Advance Directive            [] Close Referral. Routing closure to referring provider/staff and to ACP Specialist .      Thank you for this referral.

## 2022-01-19 ENCOUNTER — TELEPHONE (OUTPATIENT)
Dept: FAMILY MEDICINE CLINIC | Age: 87
End: 2022-01-19

## 2022-01-19 DIAGNOSIS — G89.29 CHRONIC PAIN OF BOTH KNEES: ICD-10-CM

## 2022-01-19 DIAGNOSIS — M25.562 CHRONIC PAIN OF BOTH KNEES: ICD-10-CM

## 2022-01-19 DIAGNOSIS — M25.552 BILATERAL HIP PAIN: ICD-10-CM

## 2022-01-19 DIAGNOSIS — M25.551 BILATERAL HIP PAIN: ICD-10-CM

## 2022-01-19 DIAGNOSIS — M15.9 PRIMARY OSTEOARTHRITIS INVOLVING MULTIPLE JOINTS: ICD-10-CM

## 2022-01-19 DIAGNOSIS — Z91.81 AT HIGH RISK FOR FALLS: ICD-10-CM

## 2022-01-19 DIAGNOSIS — M25.561 CHRONIC PAIN OF BOTH KNEES: ICD-10-CM

## 2022-01-19 NOTE — TELEPHONE ENCOUNTER
Arpan Bernardo,  Are you able to reprint the rx for patients lift chair so I can get this mailed out please. Patient states she has never received it.     Thank you

## 2022-03-10 ENCOUNTER — CLINICAL DOCUMENTATION (OUTPATIENT)
Dept: SPIRITUAL SERVICES | Age: 87
End: 2022-03-10

## 2022-03-10 NOTE — ACP (ADVANCE CARE PLANNING)
Advance Care Planning   Ambulatory ACP Specialist Patient Outreach    Date:  3/10/2022  ACP Specialist:  Satish Zuniga    Outreach call to patient in follow-up to ACP Specialist referral from: Meri Tobias PA-C    [x] PCP  [] Provider   [] Ambulatory Care Management [] Other for Reason:    [x] Advance Directive Assistance  [] Code Status Discussion  [] Complete Portable DNR Order  [] Discuss Goals of Care  [] Complete POST/MOST  [] Early ACP Decision-Making  [] Other    Date Referral Received:12/30/21    Today's Outreach:  [] First   [] Second  [x] Third                               Third outreach made by [x]  phone  [] email []   [place]     Intervention:  [] Spoke with Patient  [x] Left VM requesting return call      Outcome: Left detailed VM for Patient to call back       Next Step:   [] ACP scheduled conversation  [] Outreach again in one week               [] Email / Mail [address]  [] Email / Mail Advance Directive            [x] Close Referral. Routing closure to referring provider/staff and to ACP Specialist .      Thank you for this referral.

## 2022-04-03 DIAGNOSIS — H93.8X1 SENSATION OF FULLNESS IN RIGHT EAR: ICD-10-CM

## 2022-04-04 NOTE — TELEPHONE ENCOUNTER
Future Appointments    Encounter Information    Provider Department Appt Notes   4/28/2022 Ayse Viveros PA-C [facility] Primary Care 6 month follow up       Past Visits    Date Provider Specialty Visit Type Primary Dx   12/30/2021 Ayse Viveros PA-C Family Medicine Virtual Visit Routine general medical examination at a health care facility

## 2022-04-05 RX ORDER — FLUTICASONE PROPIONATE 50 MCG
SPRAY, SUSPENSION (ML) NASAL
Qty: 16 G | Refills: 2 | Status: SHIPPED | OUTPATIENT
Start: 2022-04-05 | End: 2022-07-13

## 2022-04-28 ENCOUNTER — OFFICE VISIT (OUTPATIENT)
Dept: FAMILY MEDICINE CLINIC | Age: 87
End: 2022-04-28
Payer: MEDICARE

## 2022-04-28 VITALS
OXYGEN SATURATION: 95 % | WEIGHT: 199 LBS | HEART RATE: 78 BPM | BODY MASS INDEX: 31.98 KG/M2 | DIASTOLIC BLOOD PRESSURE: 80 MMHG | SYSTOLIC BLOOD PRESSURE: 130 MMHG | RESPIRATION RATE: 16 BRPM | HEIGHT: 66 IN

## 2022-04-28 DIAGNOSIS — E53.8 B12 DEFICIENCY: ICD-10-CM

## 2022-04-28 DIAGNOSIS — E55.9 VITAMIN D DEFICIENCY: ICD-10-CM

## 2022-04-28 DIAGNOSIS — E78.2 MIXED HYPERLIPIDEMIA: ICD-10-CM

## 2022-04-28 DIAGNOSIS — R06.02 SOB (SHORTNESS OF BREATH) ON EXERTION: ICD-10-CM

## 2022-04-28 DIAGNOSIS — E03.9 HYPOTHYROIDISM, UNSPECIFIED TYPE: ICD-10-CM

## 2022-04-28 DIAGNOSIS — I10 ESSENTIAL HYPERTENSION: ICD-10-CM

## 2022-04-28 DIAGNOSIS — M79.605 PAIN IN BOTH LOWER EXTREMITIES: ICD-10-CM

## 2022-04-28 DIAGNOSIS — M25.552 BILATERAL HIP PAIN: ICD-10-CM

## 2022-04-28 DIAGNOSIS — M25.561 CHRONIC PAIN OF BOTH KNEES: ICD-10-CM

## 2022-04-28 DIAGNOSIS — M79.604 PAIN IN BOTH LOWER EXTREMITIES: ICD-10-CM

## 2022-04-28 DIAGNOSIS — E66.9 OBESITY (BMI 30-39.9): ICD-10-CM

## 2022-04-28 DIAGNOSIS — M15.9 OSTEOARTHRITIS OF MULTIPLE JOINTS, UNSPECIFIED OSTEOARTHRITIS TYPE: ICD-10-CM

## 2022-04-28 DIAGNOSIS — R53.82 CHRONIC FATIGUE: ICD-10-CM

## 2022-04-28 DIAGNOSIS — R29.898 WEAKNESS OF BOTH LEGS: ICD-10-CM

## 2022-04-28 DIAGNOSIS — R09.89 OTHER SPECIFIED SYMPTOMS AND SIGNS INVOLVING THE CIRCULATORY AND RESPIRATORY SYSTEMS: ICD-10-CM

## 2022-04-28 DIAGNOSIS — M25.562 CHRONIC PAIN OF BOTH KNEES: ICD-10-CM

## 2022-04-28 DIAGNOSIS — R21 RASH AND NONSPECIFIC SKIN ERUPTION: ICD-10-CM

## 2022-04-28 DIAGNOSIS — K21.9 GASTROESOPHAGEAL REFLUX DISEASE WITHOUT ESOPHAGITIS: ICD-10-CM

## 2022-04-28 DIAGNOSIS — Z91.81 AT HIGH RISK FOR INJURY RELATED TO FALL: ICD-10-CM

## 2022-04-28 DIAGNOSIS — J42 CHRONIC BRONCHITIS, UNSPECIFIED CHRONIC BRONCHITIS TYPE (HCC): Primary | ICD-10-CM

## 2022-04-28 DIAGNOSIS — M25.551 BILATERAL HIP PAIN: ICD-10-CM

## 2022-04-28 DIAGNOSIS — Z91.81 AT HIGH RISK FOR FALLS: ICD-10-CM

## 2022-04-28 DIAGNOSIS — G89.29 CHRONIC PAIN OF BOTH KNEES: ICD-10-CM

## 2022-04-28 PROBLEM — M71.121 SEPTIC BURSITIS OF ELBOW, RIGHT: Status: RESOLVED | Noted: 2021-09-26 | Resolved: 2022-04-28

## 2022-04-28 LAB
ALBUMIN SERPL-MCNC: 4.5 G/DL (ref 3.5–4.6)
ALP BLD-CCNC: 105 U/L (ref 40–130)
ALT SERPL-CCNC: 15 U/L (ref 0–33)
ANION GAP SERPL CALCULATED.3IONS-SCNC: 14 MEQ/L (ref 9–15)
AST SERPL-CCNC: 19 U/L (ref 0–35)
BILIRUB SERPL-MCNC: 0.5 MG/DL (ref 0.2–0.7)
BUN BLDV-MCNC: 18 MG/DL (ref 8–23)
C-REACTIVE PROTEIN: 3.9 MG/L (ref 0–5)
CALCIUM SERPL-MCNC: 9.3 MG/DL (ref 8.5–9.9)
CHLORIDE BLD-SCNC: 100 MEQ/L (ref 95–107)
CHOLESTEROL, TOTAL: 139 MG/DL (ref 0–199)
CO2: 27 MEQ/L (ref 20–31)
CREAT SERPL-MCNC: 0.54 MG/DL (ref 0.5–0.9)
GFR AFRICAN AMERICAN: >60
GFR NON-AFRICAN AMERICAN: >60
GLOBULIN: 3.2 G/DL (ref 2.3–3.5)
GLUCOSE BLD-MCNC: 78 MG/DL (ref 70–99)
HCT VFR BLD CALC: 42.8 % (ref 37–47)
HDLC SERPL-MCNC: 43 MG/DL (ref 40–59)
HEMOGLOBIN: 13.9 G/DL (ref 12–16)
LDL CHOLESTEROL CALCULATED: 72 MG/DL (ref 0–129)
MCH RBC QN AUTO: 29.2 PG (ref 27–31.3)
MCHC RBC AUTO-ENTMCNC: 32.6 % (ref 33–37)
MCV RBC AUTO: 89.7 FL (ref 82–100)
PDW BLD-RTO: 15.9 % (ref 11.5–14.5)
PLATELET # BLD: 196 K/UL (ref 130–400)
POTASSIUM SERPL-SCNC: 3.8 MEQ/L (ref 3.4–4.9)
PRO-BNP: 176 PG/ML
RBC # BLD: 4.77 M/UL (ref 4.2–5.4)
SODIUM BLD-SCNC: 141 MEQ/L (ref 135–144)
T4 FREE: 1.58 NG/DL (ref 0.84–1.68)
TOTAL PROTEIN: 7.7 G/DL (ref 6.3–8)
TRIGL SERPL-MCNC: 122 MG/DL (ref 0–150)
TSH SERPL DL<=0.05 MIU/L-ACNC: 3.7 UIU/ML (ref 0.44–3.86)
WBC # BLD: 6 K/UL (ref 4.8–10.8)

## 2022-04-28 PROCEDURE — G8427 DOCREV CUR MEDS BY ELIG CLIN: HCPCS | Performed by: PHYSICIAN ASSISTANT

## 2022-04-28 PROCEDURE — 1090F PRES/ABSN URINE INCON ASSESS: CPT | Performed by: PHYSICIAN ASSISTANT

## 2022-04-28 PROCEDURE — 3023F SPIROM DOC REV: CPT | Performed by: PHYSICIAN ASSISTANT

## 2022-04-28 PROCEDURE — 4040F PNEUMOC VAC/ADMIN/RCVD: CPT | Performed by: PHYSICIAN ASSISTANT

## 2022-04-28 PROCEDURE — G8417 CALC BMI ABV UP PARAM F/U: HCPCS | Performed by: PHYSICIAN ASSISTANT

## 2022-04-28 PROCEDURE — 99214 OFFICE O/P EST MOD 30 MIN: CPT | Performed by: PHYSICIAN ASSISTANT

## 2022-04-28 PROCEDURE — 1036F TOBACCO NON-USER: CPT | Performed by: PHYSICIAN ASSISTANT

## 2022-04-28 PROCEDURE — 1123F ACP DISCUSS/DSCN MKR DOCD: CPT | Performed by: PHYSICIAN ASSISTANT

## 2022-04-28 RX ORDER — DIAPER,BRIEF,INFANT-TODD,DISP
EACH MISCELLANEOUS
Qty: 56 G | Refills: 1 | Status: SHIPPED | OUTPATIENT
Start: 2022-04-28

## 2022-04-28 SDOH — ECONOMIC STABILITY: FOOD INSECURITY: WITHIN THE PAST 12 MONTHS, YOU WORRIED THAT YOUR FOOD WOULD RUN OUT BEFORE YOU GOT MONEY TO BUY MORE.: NEVER TRUE

## 2022-04-28 SDOH — ECONOMIC STABILITY: FOOD INSECURITY: WITHIN THE PAST 12 MONTHS, THE FOOD YOU BOUGHT JUST DIDN'T LAST AND YOU DIDN'T HAVE MONEY TO GET MORE.: NEVER TRUE

## 2022-04-28 ASSESSMENT — ENCOUNTER SYMPTOMS
BACK PAIN: 1
DIARRHEA: 0
COLOR CHANGE: 0
COUGH: 0
CHEST TIGHTNESS: 0
NAUSEA: 0
ABDOMINAL PAIN: 0
CONSTIPATION: 0
ABDOMINAL DISTENTION: 0
TROUBLE SWALLOWING: 0
WHEEZING: 0
BLOOD IN STOOL: 0
SHORTNESS OF BREATH: 1

## 2022-04-28 ASSESSMENT — PATIENT HEALTH QUESTIONNAIRE - PHQ9
SUM OF ALL RESPONSES TO PHQ QUESTIONS 1-9: 0
2. FEELING DOWN, DEPRESSED OR HOPELESS: 0
SUM OF ALL RESPONSES TO PHQ QUESTIONS 1-9: 0
SUM OF ALL RESPONSES TO PHQ QUESTIONS 1-9: 0
1. LITTLE INTEREST OR PLEASURE IN DOING THINGS: 0
SUM OF ALL RESPONSES TO PHQ QUESTIONS 1-9: 0
SUM OF ALL RESPONSES TO PHQ9 QUESTIONS 1 & 2: 0

## 2022-04-28 ASSESSMENT — SOCIAL DETERMINANTS OF HEALTH (SDOH): HOW HARD IS IT FOR YOU TO PAY FOR THE VERY BASICS LIKE FOOD, HOUSING, MEDICAL CARE, AND HEATING?: NOT HARD AT ALL

## 2022-04-28 NOTE — PROGRESS NOTES
Subjective  Fernando Thorne, 80 y.o. female presents today with:  Chief Complaint   Patient presents with    6 Month Follow-Up     follow up     Pain     pain starting in neck down to toes on left side of body      HPI   In the office today for follow up. Last OV with me: 12/30/21 via [facility]. C/o increased fatigue, SOB w/ exertion, edema of LEs. Complaint with her medications. No follow up with pulmonology or cardiology. No recent falls. Due for labs. Intermittent dizziness, but nothing that is worsening. Had cardiac work-up in 2020. Holter monitor showed bundle branch block and PVCs. Echocardiogram with EF of 60%. Some restrictive filling patterns. Fatigue. Increased. 3L oxygen at night. No CPAP. C/o feeling tired constantly. Has noticed increased hair loss. NO chest pain. Chronic joint pain. Stable. Voltaren gel and oral nsaid has helped. Reports worsening bilateral knee and hip pain.  History of arthritis with L TKA x 2.    Stiffness and discomfort. Ambulating with rollator. Cold/damp weather makes symptoms worse. Did no see ortho. Lives with son.  'good and bad days'. Review of Systems   Constitutional: Positive for activity change and fatigue. Negative for appetite change, chills, diaphoresis, fever and unexpected weight change. HENT: Negative for congestion (seasonal), postnasal drip and trouble swallowing. Respiratory: Positive for shortness of breath (w/ exertion). Negative for cough, chest tightness and wheezing. Cardiovascular: Negative for chest pain, palpitations and leg swelling. Gastrointestinal: Negative for abdominal distention, abdominal pain, blood in stool, constipation, diarrhea and nausea. Genitourinary: Negative for dysuria and frequency. Musculoskeletal: Positive for arthralgias, back pain, gait problem (ambulates with walker) and joint swelling. Negative for myalgias and neck pain.    Skin: Negative for color change. Neurological: Positive for weakness (BLE). Negative for dizziness, seizures, light-headedness, numbness and headaches. Psychiatric/Behavioral: Negative for dysphoric mood and sleep disturbance. The patient is not nervous/anxious. Past Medical History:   Diagnosis Date    BPV (benign positional vertigo) 2019    CAD (coronary artery disease)     Dr Yasmin Robles COPD (chronic obstructive pulmonary disease) ([facility] [address] 2012, VQ scan and CXR    has supplemental oxygen, uses it for 4-6 hours nightly, no inhalers use    Diverticulosis     Hiatal hernia     Hiatal hernia with gastroesophageal reflux 2015    EGD Dr Kvng Lakhani, [facility]    History of blood transfusion     History of colonoscopy with polypectomy 2017    Dr Félix Zaldivar History of sleep apnea 2011    no CPAP use    Hypertension     Hypothyroidism     Nocturnal hypoxia 2011    nocturnal O2, 3 LNC, 4-6 hours nightly    Obesity (BMI 30-39. 9)     Osteoarthritis of multiple joints     Oxygen dependent 2019    Postnasal drip     Septic bursitis of elbow, right 9/26/2021    Sigmoid diverticulosis     Weakness of both legs      Past Surgical History:   Procedure Laterality Date    HYSTERECTOMY      fibroid, bleeding    JOINT REPLACEMENT      OH COLON CA SCRN NOT HI RSK IND N/A 9/1/2017    COLONOSCOPY performed by Lucien Boyle MD at [facility] ESOPHAGOGASTRODUODENOSCOPY TRANSORAL DIAGNOSTIC N/A 9/1/2017    EGD ESOPHAGOGASTRODUODENOSCOPY performed by Lucien Boyle MD at [address] [facility],  [address] Right 04/03/2012    TOTAL KNEE ARTHROPLASTY Bilateral     left x 2, right x1 (Dr Deyanira Johnson)   [address]  12/29/15    Dr. 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, retired   Tobacco Use    Smoking status: Former Smoker    Smokeless tobacco: Never Used    Tobacco comment: quit 50 years ago   Substance and Sexual Activity    Alcohol use: No     Alcohol/week: 0.0 standard drinks    Drug use: No    Sexual activity: Never   Other Topics Concern    Not on file   Social History Narrative    Born in [place] (grandparents came from [place], settled in [place])    ,  (X 2)    Lives in a house with two of her sons    7 children, 3 M, 1 F    Lost 63 yo son in Feb 2018 from lung cancer    Was  for the [employer], cooking, housework     Social Determinants of Health     Financial Resource Strain: Low Risk     Difficulty of Paying Living Expenses: Not hard at all   Food Insecurity: No Food Insecurity    Worried About [address] in the Last Year: Never true    [address] in the Last Year: Never true   Transportation Needs:     Lack of Transportation (Medical): Not on file    Lack of Transportation (Non-Medical):  Not on file   Physical Activity:     Days of Exercise per Week: Not on file    Minutes of Exercise per Session: Not on file   Stress:     Feeling of Stress : Not on file   Social Connections:     Frequency of Communication with Friends and Family: Not on file    Frequency of Social Gatherings with Friends and Family: Not on file    Attends Restorationist Services: Not on file    Active Member of [address] [place] [address] or Organizations: Not on file    Attends Club or Organization Meetings: Not on file    Marital Status: Not on file   Intimate Partner Violence:     Fear of Current or Ex-Partner: Not on file    Emotionally Abused: Not on file    Physically Abused: Not on file    Sexually Abused: Not on file   Housing Stability:     Unable to Pay for Housing in the Last Year: Not on file    Number of [place] in the Last Year: Not on file    Unstable Housing in the Last Year: Not on file     Family History Problem Relation Age of Onset    Diabetes Maternal Aunt     Osteoarthritis Father     Osteoarthritis Sister     Osteoarthritis Brother     Hypertension Mother     Heart Failure Mother         dec age 71, rheumatic     Allergies   Allergen Reactions    Ciprofloxacin Hcl     Lipitor [Atorvastatin]     Zetia [Ezetimibe]      Current Outpatient Medications   Medication Sig Dispense Refill    hydrocortisone 0.5 % ointment Apply topically 2 times daily. 56 g 1    diclofenac (VOLTAREN) 50 MG EC tablet TAKE 1 TABLET BY MOUTH TWICE DAILY WITH MEALS. TAKE AS NEEDED FOR JOINT PAIN 180 tablet 1    fluticasone (FLONASE) 50 MCG/ACT nasal spray Indications: Congestion of the Sinuses Around the Nose 2 sprays in each nostril, BID for congestion.  16 g 2    Lift Chair MISC by Does not apply route 1 each 0    levothyroxine (SYNTHROID) 50 MCG tablet Take 1 tablet by mouth every morning (before breakfast) 90 tablet 1    valsartan (DIOVAN) 160 MG tablet Take 2 tablets by mouth nightly Indications: High Blood Pressure Disorder 180 tablet 1    rosuvastatin (CRESTOR) 10 MG tablet TAKE 1 TABLET BY MOUTH EVERY DAY 90 tablet 1    amLODIPine (NORVASC) 2.5 MG tablet TAKE 1 TABLET BY MOUTH DAILY (before lunch) 90 tablet 1    pantoprazole (PROTONIX) 40 MG tablet Take 1 tablet by mouth daily as needed (heartburn) 90 tablet 1    sertraline (ZOLOFT) 50 MG tablet Take 1 tablet by mouth every evening (Patient taking differently: Take 50 mg by mouth nightly Indications: Depression ) 90 tablet 3    diclofenac sodium (VOLTAREN) 1 % GEL Apply 4 grams topically 4 times daily as needed for Pain (arthritis/joint pain) (Patient taking differently: Apply 4 g topically 4 times daily Indications: Joint Pain ) 200 g 2    polyethylene glycol (GLYCOLAX) 17 GM/SCOOP powder Take 17 g by mouth daily as needed (constipation) Indications: Constipation       nitroGLYCERIN (NITROSTAT) 0.4 MG SL tablet Place 1 tablet under the tongue every 5 minutes as needed for Chest pain (Patient taking differently: Place 0.4 mg under the tongue every 5 minutes as needed for Chest pain Indications: Angina Pectoris ) 25 tablet 6    aspirin 81 MG tablet Take 1 tablet by mouth daily 90 tablet 3    magnesium oxide (MAG-OX) 140 MG CAPS Take 140 mg by mouth daily      Misc. Devices MISC Portable Oxygen concentrator 1 Device 0    acetaminophen (TYLENOL) 500 MG tablet Take 500 mg by mouth every 6 hours as needed for Pain (1- 2 tabs in am) Indications: for pain       vitamin B-12 (CYANOCOBALAMIN) 1000 MCG tablet Take 1,000 mcg by mouth daily Indications: in am.      Cholecalciferol (VITAMIN D3) 2000 units CAPS Take 2,000 Units by mouth Daily with supper       Multiple Vitamins-Minerals (MULTIVITAMIN PO) Take 1 tablet by mouth daily       OXYGEN Inhale 3 L into the lungs continuous Uses prn at bedtime       No current facility-administered medications for this visit. PMH, Surgical Hx, Family Hx, and Social Hx reviewed and updated. Health Maintenance reviewed. Objective  Vitals:    04/28/22 1113   BP: 130/80   Site: Left Upper Arm   Position: Sitting   Cuff Size: Medium Adult   Pulse: 78   Resp: 16   SpO2: 95%   Weight: 199 lb (90.3 kg)   Height: 5' 6\" (1.676 m)     BP Readings from Last 3 Encounters:   04/28/22 130/80   10/28/21 108/80   10/06/21 130/68     Wt Readings from Last 3 Encounters:   04/28/22 199 lb (90.3 kg)   10/28/21 199 lb (90.3 kg)   09/26/21 199 lb (90.3 kg)     Physical Exam  Vitals reviewed. Constitutional:       Appearance: Normal appearance. HENT:      Head: Normocephalic and atraumatic. Nose: Congestion present. Mouth/Throat:      Pharynx: Oropharynx is clear. Eyes:      Conjunctiva/sclera: Conjunctivae normal.   Cardiovascular:      Rate and Rhythm: Normal rate. Heart sounds: Murmur heard. Pulmonary:      Effort: Pulmonary effort is normal.      Breath sounds: Normal breath sounds. No wheezing, rhonchi or rales. Abdominal:      Palpations: Abdomen is soft. Musculoskeletal:      Right lower leg: Edema present. Left lower leg: Edema present. Comments: 2+ edema of LEs   Neurological:      General: No focal deficit present. Mental Status: She is alert. Sensory: No sensory deficit. Motor: Weakness present. Gait: Gait abnormal.   Psychiatric:         Mood and Affect: Mood normal.         Behavior: Behavior normal.         Thought Content: Thought content normal.         Judgment: Judgment normal.       Assessment & Plan   Melinda Carias was seen today for 6 month follow-up and pain. Diagnoses and all orders for this visit:    Chronic bronchitis, unspecified chronic bronchitis type ([facility] [address]    Gastroesophageal reflux disease without esophagitis    Vitamin D deficiency  -     Vitamin D 25 Hydroxy; Future    Essential hypertension  -     CBC; Future  -     Comprehensive Metabolic Panel; Future  -     C-Reactive Protein; Future  -     [address] Melissa Simon DO, In[facility] Cardiology, [place]    B12 deficiency  -     Vitamin B12 & Folate; Future    Hypothyroidism, unspecified type  -     TSH; Future  -     T4, Free; Future    Mixed hyperlipidemia  -     Lipid Panel; Future  -     [address] Kenton Simon DO, [facility], [place]  -     US DUP LOWER EXTREMITY LEFT ARTERIES; Future  -     US DUP LOWER EXTREMITY RIGHT ARTERIES; Future    Osteoarthritis of multiple joints, unspecified osteoarthritis type  -     diclofenac (VOLTAREN) 50 MG EC tablet; TAKE 1 TABLET BY MOUTH TWICE DAILY WITH MEALS. TAKE AS NEEDED FOR JOINT PAIN    Bilateral hip pain  -     diclofenac (VOLTAREN) 50 MG EC tablet; TAKE 1 TABLET BY MOUTH TWICE DAILY WITH MEALS. TAKE AS NEEDED FOR JOINT PAIN    Chronic pain of both knees  -     diclofenac (VOLTAREN) 50 MG EC tablet; TAKE 1 TABLET BY MOUTH TWICE DAILY WITH MEALS. TAKE AS NEEDED FOR JOINT PAIN    Rash and nonspecific skin eruption  -     hydrocortisone 0.5 % ointment;  Apply topically 2 times daily. Chronic fatigue  -     Brain Natriuretic Peptide; Future    SOB (shortness of breath) on exertion  -     Brain Natriuretic Peptide; Future  -     [address], Kenton, DO, In[facility] Cardiology, [place]  -     US DUP LOWER EXTREMITY RIGHT ARTERIES; Future    Pain in both lower extremities  -     Analia - Vani, Kenton, DO, In[facility] Cardiology, [place]  -     US DUP LOWER EXTREMITY LEFT ARTERIES; Future  -     US DUP LOWER EXTREMITY RIGHT ARTERIES; Future    Other specified symptoms and signs involving the circulatory and respiratory systems   -     US DUP LOWER EXTREMITY LEFT ARTERIES; Future  -     US DUP LOWER EXTREMITY RIGHT ARTERIES; Future    Weakness of both legs    Obesity (BMI 30-39. 9)    At high risk for falls    At high risk for injury related to fall    Further work-up and labs today. Referral to cardiology for assessment/review. Rule out CHF exacerbation and PAD. Follow up with me in 4-6 weeks. Sooner with any questions/concerns. Orders Placed This Encounter   Procedures    US DUP LOWER EXTREMITY LEFT ARTERIES     Standing Status:   Future     Standing Expiration Date:   4/28/2023     Order Specific Question:   Reason for exam:     Answer:   rule out stenosis, pad.  US DUP LOWER EXTREMITY RIGHT ARTERIES     Standing Status:   Future     Standing Expiration Date:   4/28/2023     Order Specific Question:   Reason for exam:     Answer:   leg pain, edema, rule out PAD.  Lipid Panel     Standing Status:   Future     Standing Expiration Date:   4/28/2023     Order Specific Question:   Is Patient Fasting?/# of Hours     Answer:   yes, 8 hours.     TSH     Standing Status:   Future     Standing Expiration Date:   4/28/2023    CBC     Standing Status:   Future     Standing Expiration Date:   4/28/2023    Comprehensive Metabolic Panel     Standing Status:   Future     Standing Expiration Date:   4/28/2023    Vitamin B12 & Folate     Standing Status:   Future     Standing Expiration Date:   4/28/2023    C-Reactive Protein     Standing Status:   Future     Standing Expiration Date:   4/28/2023    T4, Free     Standing Status:   Future     Standing Expiration Date:   4/28/2023    Vitamin D 25 Hydroxy     Standing Status:   Future     Standing Expiration Date:   4/28/2023    Brain Natriuretic Peptide     Standing Status:   Future     Standing Expiration Date:   4/28/2023   [facility] - Lina Price DO, Invasive Cardiology, [place]     Referral Priority:   Routine     Referral Type:   Eval and Treat     Referral Reason:   Specialty Services Required     Requested Specialty:   Cardiology     Number of Visits Requested:   1     Orders Placed This Encounter   Medications    hydrocortisone 0.5 % ointment     Sig: Apply topically 2 times daily. Dispense:  56 g     Refill:  1    diclofenac (VOLTAREN) 50 MG EC tablet     Sig: TAKE 1 TABLET BY MOUTH TWICE DAILY WITH MEALS. TAKE AS NEEDED FOR JOINT PAIN     Dispense:  180 tablet     Refill:  1     Medications Discontinued During This Encounter   Medication Reason    diclofenac (VOLTAREN) 50 MG EC tablet REORDER     No follow-ups on file. Reviewed with the patient: current clinical status, medications, activities and diet. Side effects, adverse effects of the medication prescribed today, as well as treatment plan/ rationale and result expectations have been discussed with the patient who expresses understanding and desires to proceed. Close follow up to evaluate treatment results and for coordination of care. I have reviewed the patient's medical history in detail and updated the computerized patient record.     Tova Mazariegos PA-C

## 2022-04-28 NOTE — TELEPHONE ENCOUNTER
DDM states the hydrocortisone 0.5% is no longer available. Would you like to prescribe something else?     Please advise    Call back [address] [phone]

## 2022-04-29 LAB
FOLATE: >20 NG/ML
VITAMIN B-12: 1745 PG/ML (ref 232–1245)
VITAMIN D 25-HYDROXY: 37.1 NG/ML

## 2022-05-20 ENCOUNTER — HOSPITAL ENCOUNTER (OUTPATIENT)
Dept: ULTRASOUND IMAGING | Age: 87
Discharge: HOME | End: 2022-05-22
Payer: MEDICARE

## 2022-05-20 DIAGNOSIS — R06.02 SOB (SHORTNESS OF BREATH) ON EXERTION: ICD-10-CM

## 2022-05-20 DIAGNOSIS — R09.89 OTHER SPECIFIED SYMPTOMS AND SIGNS INVOLVING THE CIRCULATORY AND RESPIRATORY SYSTEMS: ICD-10-CM

## 2022-05-20 DIAGNOSIS — M79.604 PAIN IN BOTH LOWER EXTREMITIES: ICD-10-CM

## 2022-05-20 DIAGNOSIS — M79.605 PAIN IN BOTH LOWER EXTREMITIES: ICD-10-CM

## 2022-05-20 DIAGNOSIS — E78.2 MIXED HYPERLIPIDEMIA: ICD-10-CM

## 2022-05-20 PROCEDURE — 93925 LOWER EXTREMITY STUDY: CPT

## 2022-06-03 ENCOUNTER — OFFICE VISIT (OUTPATIENT)
Dept: CARDIOLOGY CLINIC | Age: 87
End: 2022-06-03
Payer: MEDICARE

## 2022-06-03 VITALS
OXYGEN SATURATION: 97 % | HEART RATE: 83 BPM | HEIGHT: 66 IN | DIASTOLIC BLOOD PRESSURE: 88 MMHG | SYSTOLIC BLOOD PRESSURE: 136 MMHG | BODY MASS INDEX: 31.31 KG/M2 | RESPIRATION RATE: 16 BRPM | WEIGHT: 194.8 LBS

## 2022-06-03 DIAGNOSIS — I44.0 AV BLOCK, 1ST DEGREE: ICD-10-CM

## 2022-06-03 DIAGNOSIS — E78.2 MIXED HYPERLIPIDEMIA: ICD-10-CM

## 2022-06-03 DIAGNOSIS — I10 ESSENTIAL HYPERTENSION: Primary | ICD-10-CM

## 2022-06-03 DIAGNOSIS — I25.10 CORONARY ARTERY DISEASE INVOLVING NATIVE CORONARY ARTERY OF NATIVE HEART WITHOUT ANGINA PECTORIS: ICD-10-CM

## 2022-06-03 DIAGNOSIS — M79.605 LEFT LEG PAIN: ICD-10-CM

## 2022-06-03 DIAGNOSIS — I73.9 PAD (PERIPHERAL ARTERY DISEASE) (HCC): ICD-10-CM

## 2022-06-03 DIAGNOSIS — E66.9 OBESITY (BMI 30-39.9): ICD-10-CM

## 2022-06-03 PROBLEM — R06.02 SOB (SHORTNESS OF BREATH) ON EXERTION: Status: RESOLVED | Noted: 2022-04-28 | Resolved: 2022-06-03

## 2022-06-03 PROBLEM — R94.31 ABNORMAL EKG: Status: RESOLVED | Noted: 2020-08-05 | Resolved: 2022-06-03

## 2022-06-03 PROCEDURE — G8417 CALC BMI ABV UP PARAM F/U: HCPCS | Performed by: INTERNAL MEDICINE

## 2022-06-03 PROCEDURE — 99204 OFFICE O/P NEW MOD 45 MIN: CPT | Performed by: INTERNAL MEDICINE

## 2022-06-03 PROCEDURE — 1090F PRES/ABSN URINE INCON ASSESS: CPT | Performed by: INTERNAL MEDICINE

## 2022-06-03 PROCEDURE — 1036F TOBACCO NON-USER: CPT | Performed by: INTERNAL MEDICINE

## 2022-06-03 PROCEDURE — G8427 DOCREV CUR MEDS BY ELIG CLIN: HCPCS | Performed by: INTERNAL MEDICINE

## 2022-06-03 PROCEDURE — 93000 ELECTROCARDIOGRAM COMPLETE: CPT | Performed by: INTERNAL MEDICINE

## 2022-06-03 PROCEDURE — 1123F ACP DISCUSS/DSCN MKR DOCD: CPT | Performed by: INTERNAL MEDICINE

## 2022-06-03 RX ORDER — FUROSEMIDE 40 MG/1
40 TABLET ORAL DAILY
Qty: 30 TABLET | Refills: 6 | Status: SHIPPED | OUTPATIENT
Start: 2022-06-03 | End: 2022-10-19

## 2022-06-03 NOTE — PROGRESS NOTES
Chief Complaint   Patient presents with    [facility] referred.  Hypertension    Hyperlipidemia       6-3-22: Patient presents for initial medical evaluation. Patient is followed on a regular basis by Dr. Alaina Granados PA-C. Patient with past medical history of coronary disease, hypertension, dyslipidemia, first-degree AV block,. She presents with a left lower extremity discomfort from the hip down to the toes. States it feels like a burning type of pain and having a difficult time lifting her leg. She complains of left lower extremity edema as well greater than the right. She also complains of left upper extremity pain as well. Her left leg hurts with lifting it up. Status post bilateral lower extremity arterial duplex ultrasound showing severe stenosis/occlusion of the right superficial femoral artery. The report is confusing stating that there is triphasic flow in bilateral femoral arteries but then it states that there is occlusion of the right SFA. No previous history of peripheral arterial disease. She does have history of severe osteoarthritis status post bilateral knee replacements. She denies diabetes mellitus or smoking. Status post echo cardioversion fraction of 95% grade 3 diastolic filling pattern. No significant valve abnormalities  Patient has good bilateral popliteal arteries. Patient Active Problem List   Diagnosis    GERD (gastroesophageal reflux disease)    Diverticulosis    Postnasal drip    COPD [facility]chronic obstructive pulmonary disease) ([facility])    Obesity (BMI 30-39. 9)    Osteoarthritis of multiple joints    History of sleep apnea    Essential hypertension    Vitamin D deficiency    At high risk for falls    Benign paroxysmal positional vertigo    Coronary artery disease involving native coronary artery of native heart without angina pectoris    AV block, 1st degree    Dizziness    At high risk for injury related to fall    Weakness of both legs    Aortic ejection murmur    B12 deficiency    Chronic fatigue    Mixed hyperlipidemia    Hypothyroidism    Bilateral hip pain    Chronic pain of both knees    Pain in both lower extremities    Other specified symptoms and signs involving the circulatory and respiratory systems        Past Surgical History:   Procedure Laterality Date    HYSTERECTOMY      fibroid, bleeding    JOINT REPLACEMENT      HI COLON CA SCRN NOT HI RSK IND N/A 9/1/2017    COLONOSCOPY performed by Lucien Boyle MD at [address] [facility] HI ESOPHAGOGASTRODUODENOSCOPY TRANSORAL DIAGNOSTIC N/A 9/1/2017    EGD ESOPHAGOGASTRODUODENOSCOPY performed by Lucien Boyle MD at [address], [address] Right 04/03/2012    TOTAL KNEE ARTHROPLASTY Bilateral     left x 2, right x1 (Dr Deyanira Johnson)   [address]  12/29/15    Dr. Randall Au Marital status:       Spouse name: None    Number of children: 7    Years of education: None    Highest education level: None   Occupational History    Occupation: homemaker and , retired   Tobacco Use    Smoking status: Former Smoker    Smokeless tobacco: Never Used    Tobacco comment: quit 50 years ago   Substance and Sexual Activity    Alcohol use: No     Alcohol/week: 0.0 standard drinks    Drug use: No    Sexual activity: Never   Other Topics Concern    None   Social History Narrative    Born in [place] (grandparents came from [place], settled in [place])    ,  (X 2)    Lives in a house with two of her sons    7 children, 3 M, 1 F    Lost 61 yo son in Feb 2018 from lung cancer    Was  for the [employer], cooking, housework     Social Determinants of Health     Financial Resource Strain: Low Risk     Difficulty of Paying Living Expenses: Not hard at all   Food Insecurity: No Food Insecurity    Worried About [address] in the mouth every morning (before breakfast) 90 tablet 1    valsartan (DIOVAN) 160 MG tablet Take 2 tablets by mouth nightly Indications: High Blood Pressure Disorder 180 tablet 1    rosuvastatin (CRESTOR) 10 MG tablet TAKE 1 TABLET BY MOUTH EVERY DAY 90 tablet 1    pantoprazole (PROTONIX) 40 MG tablet Take 1 tablet by mouth daily as needed (heartburn) 90 tablet 1    sertraline (ZOLOFT) 50 MG tablet Take 1 tablet by mouth every evening (Patient taking differently: Take 50 mg by mouth nightly Indications: Depression ) 90 tablet 3    diclofenac sodium (VOLTAREN) 1 % GEL Apply 4 grams topically 4 times daily as needed for Pain (arthritis/joint pain) (Patient taking differently: Apply 4 g topically 4 times daily Indications: Joint Pain ) 200 g 2    polyethylene glycol (GLYCOLAX) 17 GM/SCOOP powder Take 17 g by mouth daily as needed (constipation) Indications: Constipation       nitroGLYCERIN (NITROSTAT) 0.4 MG SL tablet Place 1 tablet under the tongue every 5 minutes as needed for Chest pain (Patient taking differently: Place 0.4 mg under the tongue every 5 minutes as needed for Chest pain Indications: Angina Pectoris ) 25 tablet 6    aspirin 81 MG tablet Take 1 tablet by mouth daily 90 tablet 3    magnesium oxide (MAG-OX) 140 MG CAPS Take 140 mg by mouth daily      Misc. Devices MISC Portable Oxygen concentrator 1 Device 0    acetaminophen (TYLENOL) 500 MG tablet Take 500 mg by mouth every 6 hours as needed for Pain (1- 2 tabs in am) Indications: for pain       vitamin B-12 (CYANOCOBALAMIN) 1000 MCG tablet Take 1,000 mcg by mouth daily Indications: in am.      Cholecalciferol (VITAMIN D3) 2000 units CAPS Take 2,000 Units by mouth Daily with supper       Multiple Vitamins-Minerals (MULTIVITAMIN PO) Take 1 tablet by mouth daily       OXYGEN Inhale 3 L into the lungs continuous Uses prn at bedtime       No current facility-administered medications for this visit.        Ciprofloxacin hcl, Lipitor [atorvastatin], and Zetia [ezetimibe]    Review of Systems:  General ROS: negative  Psychological ROS: negative  Hematological and Lymphatic ROS: No history of blood clots or bleeding disorder. Respiratory ROS: no cough, shortness of breath, or wheezing  Cardiovascular ROS: no chest pain or dyspnea on exertion  Gastrointestinal ROS: no abdominal pain, change in bowel habits, or black or bloody stools  Genito-Urinary ROS: no dysuria, trouble voiding, or hematuria  Musculoskeletal ROS: negative  Neurological ROS: no TIA or stroke symptoms  Dermatological ROS: negative    VITALS:  Blood pressure 136/88, pulse 83, resp. rate 16, height 5' 6\" (1.676 m), weight 194 lb 12.8 oz (88.4 kg), SpO2 97 %. Body mass index is 31.44 kg/m². Physical Examination:  General appearance - alert, well appearing, and in no distress  Mental status - alert, oriented to person, place, and time  Neck - Neck is supple, no JVD or carotid bruits. No thyromegaly or adenopathy. Chest - clear to auscultation, no wheezes, rales or rhonchi, symmetric air entry  Heart - normal rate, regular rhythm, normal S1, S2, no murmurs, rubs, clicks or gallops  Abdomen - soft, nontender, nondistended, no masses or organomegaly  Neurological - alert, oriented, normal speech, no focal findings or movement disorder noted  Extremities - peripheral pulses normal, no pedal edema, no clubbing or cyanosis  Skin - normal coloration and turgor, no rashes, no suspicious skin lesions noted    Pulses:   Carotid pulses are 3+ on the right side, and 3+ on the left side. Radial pulses are 3+ on the right side, and 3+ on the left side. Femoral pulses are 3+ on the right side, and 3+ on the left side. Popliteal pulses are 3+ on the right side, and 3+ on the left side. Dorsalis pedis pulses are 3+ on the right side, and 3+ on the left side. Posterior tibial pulses are 3+ on the right side, and 3+ on the left side.            EKG: normal sinus rhythm, nonspecific ST and T waves changes    Orders Placed This Encounter   Procedures   Earma Barges RUNOFF Gwen Sy MD, [facility] [place]    EKG 12 lead       ASSESSMENT:     Diagnosis Orders   1. Essential hypertension  EKG 12 lead    Basic Metabolic Panel   2. Coronary artery disease involving native coronary artery of native heart without angina pectoris     3. Mixed hyperlipidemia     4. Obesity (BMI 30-39.9)     5. AV block, 1st degree     6. PAD (peripheral artery disease) (HCC)  CTA ABDOMINAL AORTA W BILAT RUNOFF W WO CONTRAST   7. Left leg pain  Abby Weiss MD, [facility] [place]     6. Left lower extremity pain does not appear to be vascular in nature. 7.  Left upper extremity pain questionable etiology. 8.  Bilateral lower extremity edema. Questionable secondary to diastolic dysfunction/congestive heart failure plus or minus amlodipine    PLAN:       As always, aggressive risk factor modification is strongly recommended. We should adhere to the JNC VIII guidelines for HTN management and the NCEP ATP III guidelines for LDL-C management. Cardiac diet is always recommended with low fat, cholesterol, calories and sodium. Continue medications at current doses. Discontinue Norvasc secondary to lower extremity edema. Start Lasix 40 mg p.o. daily. Check BMP in one week. Refer to physical medicine rehab for left lower extremity as well as left upper extremity pain. Check CTA of bilateral lower extremities for confirmation of peripheral arterial disease. Bilateral extremity arterial duplex ultrasound report is very confusing. Consider ischemic evaluation in near future due to risk factors. Patient was advised and encouraged to check blood pressure at home or at a pharmacy, maintain a logbook, and also call us back if blood pressure are above the target ranges or if it is low.  Patient clearly understands and agrees to the instructions. We will need to continue to monitor muscle and liver enzymes, BUN, CR, and electrolytes.     Check EKG

## 2022-07-12 DIAGNOSIS — M25.551 BILATERAL HIP PAIN: ICD-10-CM

## 2022-07-12 DIAGNOSIS — M25.552 BILATERAL HIP PAIN: ICD-10-CM

## 2022-07-12 DIAGNOSIS — Z76.0 MEDICATION REFILL: ICD-10-CM

## 2022-07-12 DIAGNOSIS — M15.9 OSTEOARTHRITIS OF MULTIPLE JOINTS, UNSPECIFIED OSTEOARTHRITIS TYPE: ICD-10-CM

## 2022-07-12 DIAGNOSIS — M15.9 PRIMARY OSTEOARTHRITIS INVOLVING MULTIPLE JOINTS: ICD-10-CM

## 2022-07-12 DIAGNOSIS — M25.562 CHRONIC PAIN OF BOTH KNEES: ICD-10-CM

## 2022-07-12 DIAGNOSIS — I10 ESSENTIAL HYPERTENSION: ICD-10-CM

## 2022-07-12 DIAGNOSIS — G89.29 CHRONIC PAIN OF BOTH KNEES: ICD-10-CM

## 2022-07-12 DIAGNOSIS — M25.561 CHRONIC PAIN OF BOTH KNEES: ICD-10-CM

## 2022-07-12 DIAGNOSIS — H93.8X1 SENSATION OF FULLNESS IN RIGHT EAR: ICD-10-CM

## 2022-07-13 RX ORDER — LEVOTHYROXINE SODIUM 0.05 MG/1
50 TABLET ORAL
Qty: 90 TABLET | Refills: 2 | Status: SHIPPED | OUTPATIENT
Start: 2022-07-13

## 2022-07-13 RX ORDER — AMLODIPINE BESYLATE 2.5 MG/1
TABLET ORAL
Qty: 90 TABLET | Refills: 1 | Status: SHIPPED | OUTPATIENT
Start: 2022-07-13

## 2022-07-13 RX ORDER — ROSUVASTATIN CALCIUM 10 MG/1
TABLET, COATED ORAL
Qty: 90 TABLET | Refills: 1 | Status: SHIPPED | OUTPATIENT
Start: 2022-07-13

## 2022-07-13 RX ORDER — FLUTICASONE PROPIONATE 50 MCG
SPRAY, SUSPENSION (ML) NASAL
Qty: 16 G | Refills: 2 | Status: SHIPPED | OUTPATIENT
Start: 2022-07-13 | End: 2022-10-18

## 2022-07-13 RX ORDER — VALSARTAN 160 MG/1
TABLET ORAL
Qty: 180 TABLET | Refills: 2 | Status: SHIPPED | OUTPATIENT
Start: 2022-07-13

## 2022-07-13 RX ORDER — PANTOPRAZOLE SODIUM 40 MG/1
40 TABLET, DELAYED RELEASE ORAL DAILY PRN
Qty: 90 TABLET | Refills: 2 | Status: SHIPPED | OUTPATIENT
Start: 2022-07-13

## 2022-08-11 DIAGNOSIS — Z76.0 MEDICATION REFILL: ICD-10-CM

## 2022-08-16 NOTE — TELEPHONE ENCOUNTER
Rx request   Requested Prescriptions     Pending Prescriptions Disp Refills    sertraline (ZOLOFT) 50 MG tablet [Pharmacy Med Name: sertraline 50 mg tablet] 90 tablet 3     Sig: TAKE 1 TABLET BY MOUTH EVERY EVENING     LOV 4/28/2022  Last refill 8/10/21  Next Visit Date:  No future appointments.

## 2022-10-17 DIAGNOSIS — H93.8X1 SENSATION OF FULLNESS IN RIGHT EAR: ICD-10-CM

## 2022-10-18 RX ORDER — FLUTICASONE PROPIONATE 50 MCG
SPRAY, SUSPENSION (ML) NASAL
Qty: 16 G | Refills: 2 | Status: SHIPPED | OUTPATIENT
Start: 2022-10-18

## 2022-10-18 NOTE — TELEPHONE ENCOUNTER
Rx request   Requested Prescriptions     Pending Prescriptions Disp Refills    fluticasone (FLONASE) 50 MCG/ACT nasal spray [Pharmacy Med Name: fluticasone propionate 50 mcg/actuation nasal spray,suspension] 16 g 2     Sig: INHALE 2 sprays IN EACH NOSTRIL TWICE DAILY AS NEEDED for congestion     LOV 4/28/2022  Last refill 7/13/22  Next Visit Date:  No future appointments.

## 2022-10-18 NOTE — TELEPHONE ENCOUNTER
Please approve or deny this refill request. The order is pended. Thank you. LOV 6/3/2022    Next Visit Date:  No future appointments.

## 2022-10-19 RX ORDER — FUROSEMIDE 40 MG/1
40 TABLET ORAL DAILY
Qty: 30 TABLET | Refills: 6 | Status: SHIPPED | OUTPATIENT
Start: 2022-10-19

## 2023-01-15 DIAGNOSIS — G89.29 CHRONIC PAIN OF BOTH KNEES: ICD-10-CM

## 2023-01-15 DIAGNOSIS — M25.561 CHRONIC PAIN OF BOTH KNEES: ICD-10-CM

## 2023-01-15 DIAGNOSIS — Z76.0 MEDICATION REFILL: ICD-10-CM

## 2023-01-15 DIAGNOSIS — M25.552 BILATERAL HIP PAIN: ICD-10-CM

## 2023-01-15 DIAGNOSIS — M15.9 OSTEOARTHRITIS OF MULTIPLE JOINTS, UNSPECIFIED OSTEOARTHRITIS TYPE: ICD-10-CM

## 2023-01-15 DIAGNOSIS — H93.8X1 SENSATION OF FULLNESS IN RIGHT EAR: ICD-10-CM

## 2023-01-15 DIAGNOSIS — M25.551 BILATERAL HIP PAIN: ICD-10-CM

## 2023-01-15 DIAGNOSIS — M25.562 CHRONIC PAIN OF BOTH KNEES: ICD-10-CM

## 2023-01-16 RX ORDER — ROSUVASTATIN CALCIUM 10 MG/1
TABLET, COATED ORAL
Qty: 90 TABLET | Refills: 1 | Status: SHIPPED | OUTPATIENT
Start: 2023-01-16

## 2023-01-16 RX ORDER — FLUTICASONE PROPIONATE 50 MCG
SPRAY, SUSPENSION (ML) NASAL
Qty: 16 G | Refills: 1 | Status: SHIPPED | OUTPATIENT
Start: 2023-01-16

## 2023-01-25 ENCOUNTER — TELEPHONE (OUTPATIENT)
Dept: FAMILY MEDICINE CLINIC | Age: 88
End: 2023-01-25

## 2023-04-03 ENCOUNTER — NURSE TRIAGE (OUTPATIENT)
Dept: OTHER | Facility: CLINIC | Age: 88
End: 2023-04-03

## 2023-04-03 NOTE — TELEPHONE ENCOUNTER
Location of patient: [place]    Received call from [facility] at [facility] with [employer]. Subjective: Caller states \"Mostly the same old, same old, it's nothing new. I'm tried a lot and I sleep a lot. I might have a UTI. \"     Current Symptoms: fatigue, extreme tiredness, lack of energy- (patient states she has see PCP for these symptoms and NOT worse)    Past 6 months-worsening in the last 2 months: frequent urination, urinary incontinence, NO pain with urination or blood in urine, intermittent upper right back pain, left side pain    Onset: 2 months ago; worsening    Associated Symptoms: reduced activity    Pain Severity: 9/10, left side pain; intermittent    Temperature: Denies    What has been tried: Nothing    LMP: NA Pregnant: NA    Recommended disposition: See in Office Today. Patient states she cannot get to office today d/t transportation issues. Patient states she can get to office tomorrow. Writer reinforced being seen in office Today d/t concern for current symptoms. Patient verbalized understanding, but still wanting to schedule appointment for tomorrow. Care advice provided, patient verbalizes understanding; denies any other questions or concerns; instructed to call back for any new or worsening symptoms. Patient/Caller agrees with recommended disposition; writer provided warm transfer to BRUNA Srinivasan at [facility] for appointment scheduling. Attention Provider: Thank you for allowing me to participate in the care of your patient. The patient was connected to triage in response to information provided to the ECC/PSC. Please do not respond through this encounter as the response is not directed to a shared pool.     Reason for Disposition   Side (flank) or lower back pain present    Protocols used: Urinary Symptoms-ADULT-[place]

## 2023-04-04 ENCOUNTER — OFFICE VISIT (OUTPATIENT)
Dept: FAMILY MEDICINE CLINIC | Age: 88
End: 2023-04-04

## 2023-04-04 VITALS
HEIGHT: 66 IN | BODY MASS INDEX: 31.82 KG/M2 | TEMPERATURE: 98.6 F | HEART RATE: 83 BPM | DIASTOLIC BLOOD PRESSURE: 88 MMHG | SYSTOLIC BLOOD PRESSURE: 136 MMHG | WEIGHT: 198 LBS | OXYGEN SATURATION: 92 %

## 2023-04-04 DIAGNOSIS — N30.01 ACUTE CYSTITIS WITH HEMATURIA: ICD-10-CM

## 2023-04-04 DIAGNOSIS — H65.93 MIDDLE EAR EFFUSION, BILATERAL: Primary | ICD-10-CM

## 2023-04-04 LAB
BILIRUBIN, POC: NORMAL
BLOOD URINE, POC: NORMAL
CLARITY, POC: NORMAL
COLOR, POC: NORMAL
GLUCOSE URINE, POC: NORMAL
KETONES, POC: NORMAL
LEUKOCYTE EST, POC: NORMAL
NITRITE, POC: NORMAL
PH, POC: 5.5
PROTEIN, POC: NORMAL
SPECIFIC GRAVITY, POC: 1.03
UROBILINOGEN, POC: NORMAL

## 2023-04-04 RX ORDER — LORATADINE 10 MG/1
10 TABLET ORAL DAILY
Qty: 30 TABLET | Refills: 3 | Status: SHIPPED | OUTPATIENT
Start: 2023-04-04 | End: 2023-05-04

## 2023-04-04 RX ORDER — NITROFURANTOIN 25; 75 MG/1; MG/1
100 CAPSULE ORAL 2 TIMES DAILY
Qty: 10 CAPSULE | Refills: 0 | Status: SHIPPED | OUTPATIENT
Start: 2023-04-04 | End: 2023-04-11

## 2023-04-04 SDOH — ECONOMIC STABILITY: HOUSING INSECURITY
IN THE LAST 12 MONTHS, WAS THERE A TIME WHEN YOU DID NOT HAVE A STEADY PLACE TO SLEEP OR SLEPT IN A SHELTER (INCLUDING NOW)?: NO

## 2023-04-04 SDOH — ECONOMIC STABILITY: INCOME INSECURITY: HOW HARD IS IT FOR YOU TO PAY FOR THE VERY BASICS LIKE FOOD, HOUSING, MEDICAL CARE, AND HEATING?: NOT HARD AT ALL

## 2023-04-04 SDOH — ECONOMIC STABILITY: FOOD INSECURITY: WITHIN THE PAST 12 MONTHS, YOU WORRIED THAT YOUR FOOD WOULD RUN OUT BEFORE YOU GOT MONEY TO BUY MORE.: NEVER TRUE

## 2023-04-04 SDOH — ECONOMIC STABILITY: FOOD INSECURITY: WITHIN THE PAST 12 MONTHS, THE FOOD YOU BOUGHT JUST DIDN'T LAST AND YOU DIDN'T HAVE MONEY TO GET MORE.: NEVER TRUE

## 2023-04-04 ASSESSMENT — ENCOUNTER SYMPTOMS
VOMITING: 0
SINUS PAIN: 0
WHEEZING: 0
EYE ITCHING: 0
EYE REDNESS: 0
RHINORRHEA: 0
SINUS PRESSURE: 0
EYE PAIN: 0
COUGH: 0
NAUSEA: 0
ABDOMINAL PAIN: 0
SORE THROAT: 0
TROUBLE SWALLOWING: 0
SHORTNESS OF BREATH: 0
EYE DISCHARGE: 0
CHEST TIGHTNESS: 0
DIARRHEA: 0
CONSTIPATION: 0

## 2023-04-04 ASSESSMENT — PATIENT HEALTH QUESTIONNAIRE - PHQ9
SUM OF ALL RESPONSES TO PHQ QUESTIONS 1-9: 0
SUM OF ALL RESPONSES TO PHQ QUESTIONS 1-9: 0
2. FEELING DOWN, DEPRESSED OR HOPELESS: 0
1. LITTLE INTEREST OR PLEASURE IN DOING THINGS: 0
SUM OF ALL RESPONSES TO PHQ QUESTIONS 1-9: 0
SUM OF ALL RESPONSES TO PHQ QUESTIONS 1-9: 0
SUM OF ALL RESPONSES TO PHQ9 QUESTIONS 1 & 2: 0

## 2023-04-04 ASSESSMENT — VISUAL ACUITY: OU: 1

## 2023-04-04 NOTE — PROGRESS NOTES
both lower extremities 04/28/2022    Other specified symptoms and signs involving the circulatory and respiratory systems  04/28/2022    Mixed hyperlipidemia 03/27/2021    Hypothyroidism 03/27/2021    Bilateral hip pain 03/27/2021    Chronic pain of both knees 03/27/2021    B12 deficiency 12/14/2020    Chronic fatigue 12/14/2020    AV block, 1st degree 08/05/2020    Dizziness 08/05/2020    At high risk for injury related to fall 08/05/2020    Weakness of both legs 08/05/2020    Aortic ejection murmur 08/05/2020    At high risk for falls 06/22/2020    Benign paroxysmal positional vertigo 06/22/2020    Coronary artery disease involving native coronary artery of native heart without angina pectoris 06/22/2020    Vitamin D deficiency 08/08/2018    Essential hypertension 10/20/2015    GERD (gastroesophageal reflux disease)     Diverticulosis     Postnasal drip     COPD (chronic obstructive pulmonary disease) (HCC)     Obesity (BMI 30-39. 9)     Osteoarthritis of multiple joints     History of sleep apnea      Past Surgical History:   Procedure Laterality Date    HYSTERECTOMY (CERVIX STATUS UNKNOWN)      fibroid, bleeding    JOINT REPLACEMENT      NH COLON CA SCRN NOT HI RSK IND N/A 9/1/2017    COLONOSCOPY performed by Yaquelin Allen MD at [address] [place] [address] [facility] TRANSORAL DIAGNOSTIC N/A 9/1/2017    EGD ESOPHAGOGASTRODUODENOSCOPY performed by Yaquelin Allen MD at [facility] Right 04/03/2012    TOTAL KNEE ARTHROPLASTY Bilateral     left x 2, right x1 (Dr Ygnacio Mcburney)    UPPER GASTROINTESTINAL ENDOSCOPY  12/29/15    Dr. Cam Bustamante History     Socioeconomic History    Marital status:       Spouse name: None    Number of children: 7    Years of education: None    Highest education level: None   Occupational History    Occupation: homemaker and , retired   Tobacco Use    Smoking status: Former    Smokeless tobacco: Never    Tobacco comments:

## 2023-04-06 LAB
BACTERIA UR CULT: ABNORMAL
BACTERIA UR CULT: ABNORMAL
ORGANISM: ABNORMAL

## 2023-04-16 DIAGNOSIS — I10 ESSENTIAL HYPERTENSION: ICD-10-CM

## 2023-04-16 DIAGNOSIS — H93.8X1 SENSATION OF FULLNESS IN RIGHT EAR: ICD-10-CM

## 2023-04-16 DIAGNOSIS — M15.9 PRIMARY OSTEOARTHRITIS INVOLVING MULTIPLE JOINTS: ICD-10-CM

## 2023-04-16 DIAGNOSIS — Z76.0 MEDICATION REFILL: ICD-10-CM

## 2023-04-17 RX ORDER — PANTOPRAZOLE SODIUM 40 MG/1
TABLET, DELAYED RELEASE ORAL
Qty: 90 TABLET | Refills: 1 | Status: SHIPPED | OUTPATIENT
Start: 2023-04-17

## 2023-04-17 RX ORDER — LEVOTHYROXINE SODIUM 0.05 MG/1
TABLET ORAL
Qty: 90 TABLET | Refills: 1 | Status: SHIPPED | OUTPATIENT
Start: 2023-04-17

## 2023-04-17 RX ORDER — FLUTICASONE PROPIONATE 50 MCG
SPRAY, SUSPENSION (ML) NASAL
Qty: 16 G | Refills: 1 | Status: SHIPPED | OUTPATIENT
Start: 2023-04-17

## 2023-04-17 RX ORDER — FUROSEMIDE 40 MG/1
40 TABLET ORAL DAILY
Qty: 30 TABLET | Refills: 6 | Status: SHIPPED | OUTPATIENT
Start: 2023-04-17

## 2023-04-17 RX ORDER — VALSARTAN 160 MG/1
TABLET ORAL
Qty: 180 TABLET | Refills: 1 | Status: SHIPPED | OUTPATIENT
Start: 2023-04-17

## 2023-04-17 NOTE — TELEPHONE ENCOUNTER
Requesting medication refill. Please approve or deny this request.    Rx requested:  Requested Prescriptions     Pending Prescriptions Disp Refills    furosemide (LASIX) 40 MG tablet [Pharmacy Med Name: furosemide 40 mg tablet] 30 tablet 6     Sig: Take 1 tablet by mouth daily         Last Office Visit:   6/3/2022      Next Visit Date:  Future Appointments   Date Time Provider Mary Parekh   5/11/2023 11:45 AM MARY Sosa [facility]               Last refill 10/17/2022. Please approve or deny.

## 2023-05-11 ENCOUNTER — TELEPHONE (OUTPATIENT)
Dept: FAMILY MEDICINE CLINIC | Age: 88
End: 2023-05-11

## 2023-07-17 DIAGNOSIS — M15.9 OSTEOARTHRITIS OF MULTIPLE JOINTS, UNSPECIFIED OSTEOARTHRITIS TYPE: ICD-10-CM

## 2023-07-17 DIAGNOSIS — H93.8X1 SENSATION OF FULLNESS IN RIGHT EAR: ICD-10-CM

## 2023-07-17 DIAGNOSIS — M25.551 BILATERAL HIP PAIN: ICD-10-CM

## 2023-07-17 DIAGNOSIS — G89.29 CHRONIC PAIN OF BOTH KNEES: ICD-10-CM

## 2023-07-17 DIAGNOSIS — M25.552 BILATERAL HIP PAIN: ICD-10-CM

## 2023-07-17 DIAGNOSIS — M25.562 CHRONIC PAIN OF BOTH KNEES: ICD-10-CM

## 2023-07-17 DIAGNOSIS — M25.561 CHRONIC PAIN OF BOTH KNEES: ICD-10-CM

## 2023-07-17 DIAGNOSIS — Z76.0 MEDICATION REFILL: ICD-10-CM

## 2023-07-19 RX ORDER — FLUTICASONE PROPIONATE 50 MCG
SPRAY, SUSPENSION (ML) NASAL
Qty: 16 G | Refills: 1 | OUTPATIENT
Start: 2023-07-19

## 2023-07-19 RX ORDER — ROSUVASTATIN CALCIUM 10 MG/1
TABLET, COATED ORAL
Qty: 90 TABLET | Refills: 1 | OUTPATIENT
Start: 2023-07-19

## 2023-10-10 DIAGNOSIS — G89.29 CHRONIC PAIN OF BOTH KNEES: ICD-10-CM

## 2023-10-10 DIAGNOSIS — M25.561 CHRONIC PAIN OF BOTH KNEES: ICD-10-CM

## 2023-10-10 DIAGNOSIS — M25.562 CHRONIC PAIN OF BOTH KNEES: ICD-10-CM

## 2023-10-10 DIAGNOSIS — M25.551 BILATERAL HIP PAIN: ICD-10-CM

## 2023-10-10 DIAGNOSIS — M25.552 BILATERAL HIP PAIN: ICD-10-CM

## 2023-10-10 DIAGNOSIS — Z76.0 MEDICATION REFILL: ICD-10-CM

## 2023-10-10 DIAGNOSIS — M15.9 OSTEOARTHRITIS OF MULTIPLE JOINTS, UNSPECIFIED OSTEOARTHRITIS TYPE: ICD-10-CM

## 2023-10-10 DIAGNOSIS — H93.8X1 SENSATION OF FULLNESS IN RIGHT EAR: ICD-10-CM

## 2023-10-11 RX ORDER — FLUTICASONE PROPIONATE 50 MCG
SPRAY, SUSPENSION (ML) NASAL
Qty: 16 G | Refills: 1 | Status: SHIPPED | OUTPATIENT
Start: 2023-10-11

## 2023-10-11 RX ORDER — ROSUVASTATIN CALCIUM 10 MG/1
TABLET, COATED ORAL
Qty: 90 TABLET | Refills: 1 | Status: SHIPPED | OUTPATIENT
Start: 2023-10-11

## 2023-10-15 DIAGNOSIS — Z76.0 MEDICATION REFILL: ICD-10-CM

## 2023-10-15 DIAGNOSIS — M15.9 PRIMARY OSTEOARTHRITIS INVOLVING MULTIPLE JOINTS: ICD-10-CM

## 2023-10-15 DIAGNOSIS — I10 ESSENTIAL HYPERTENSION: ICD-10-CM

## 2023-10-16 NOTE — TELEPHONE ENCOUNTER
Comments:   Left vm on daughter's phone, pt's phone is disconnected . Last Office Visit (last PCP visit):   4/28/2022    Next Visit Date:  No future appointments. **If hasn't been seen in over a year OR hasn't followed up according to last diabetes/ADHD visit, make appointment for patient before sending refill to provider.     Rx requested:  Requested Prescriptions     Pending Prescriptions Disp Refills    valsartan (DIOVAN) 160 MG tablet [Pharmacy Med Name: valsartan 160 mg tablet] 180 tablet 1     Sig: TAKE 2 TABLETS BY MOUTH NIGHTLY    levothyroxine (SYNTHROID) 50 MCG tablet [Pharmacy Med Name: levothyroxine 50 mcg tablet] 90 tablet 1     Sig: TAKE 1 TABLET BY MOUTH EVERY MORNING before breakfast    diclofenac sodium (VOLTAREN) 1 % GEL [Pharmacy Med Name: diclofenac 1 % topical gel] 200 g 1     Sig: apply FOUR grams topically FOUR TIMES DAILY    pantoprazole (PROTONIX) 40 MG tablet [Pharmacy Med Name: pantoprazole 40 mg tablet,delayed release] 90 tablet 1     Sig: TAKE 1 TABLET BY MOUTH DAILY AS NEEDED for heartburn

## 2023-10-19 RX ORDER — VALSARTAN 160 MG/1
TABLET ORAL
Qty: 180 TABLET | Refills: 1 | Status: SHIPPED | OUTPATIENT
Start: 2023-10-19

## 2023-10-19 RX ORDER — PANTOPRAZOLE SODIUM 40 MG/1
TABLET, DELAYED RELEASE ORAL
Qty: 90 TABLET | Refills: 1 | Status: SHIPPED | OUTPATIENT
Start: 2023-10-19

## 2023-10-19 RX ORDER — LEVOTHYROXINE SODIUM 0.05 MG/1
TABLET ORAL
Qty: 90 TABLET | Refills: 1 | Status: SHIPPED | OUTPATIENT
Start: 2023-10-19

## 2024-01-08 ENCOUNTER — OFFICE VISIT (OUTPATIENT)
Dept: FAMILY MEDICINE CLINIC | Age: 89
End: 2024-01-08
Payer: MEDICARE

## 2024-01-08 VITALS
WEIGHT: 189 LBS | SYSTOLIC BLOOD PRESSURE: 130 MMHG | OXYGEN SATURATION: 98 % | BODY MASS INDEX: 30.37 KG/M2 | RESPIRATION RATE: 16 BRPM | HEIGHT: 66 IN | HEART RATE: 72 BPM | DIASTOLIC BLOOD PRESSURE: 88 MMHG

## 2024-01-08 VITALS
SYSTOLIC BLOOD PRESSURE: 130 MMHG | RESPIRATION RATE: 16 BRPM | BODY MASS INDEX: 30.37 KG/M2 | HEART RATE: 72 BPM | WEIGHT: 189 LBS | OXYGEN SATURATION: 98 % | DIASTOLIC BLOOD PRESSURE: 88 MMHG | HEIGHT: 66 IN

## 2024-01-08 DIAGNOSIS — M25.471 ANKLE EDEMA, BILATERAL: ICD-10-CM

## 2024-01-08 DIAGNOSIS — I49.9 IRREGULAR HEART RATE: ICD-10-CM

## 2024-01-08 DIAGNOSIS — E78.2 MIXED HYPERLIPIDEMIA: ICD-10-CM

## 2024-01-08 DIAGNOSIS — I73.9 PAD (PERIPHERAL ARTERY DISEASE) (HCC): ICD-10-CM

## 2024-01-08 DIAGNOSIS — E53.8 B12 DEFICIENCY: ICD-10-CM

## 2024-01-08 DIAGNOSIS — I44.0 AV BLOCK, 1ST DEGREE: ICD-10-CM

## 2024-01-08 DIAGNOSIS — E03.9 HYPOTHYROIDISM, UNSPECIFIED TYPE: ICD-10-CM

## 2024-01-08 DIAGNOSIS — R29.898 WEAKNESS OF BOTH LEGS: ICD-10-CM

## 2024-01-08 DIAGNOSIS — F33.0 MAJOR DEPRESSIVE DISORDER, RECURRENT, MILD (HCC): ICD-10-CM

## 2024-01-08 DIAGNOSIS — R53.82 CHRONIC FATIGUE: ICD-10-CM

## 2024-01-08 DIAGNOSIS — B37.2 INTERTRIGINOUS CANDIDIASIS: ICD-10-CM

## 2024-01-08 DIAGNOSIS — R06.02 SHORTNESS OF BREATH: ICD-10-CM

## 2024-01-08 DIAGNOSIS — R35.0 URINARY FREQUENCY: ICD-10-CM

## 2024-01-08 DIAGNOSIS — M25.472 ANKLE EDEMA, BILATERAL: ICD-10-CM

## 2024-01-08 DIAGNOSIS — I10 ESSENTIAL HYPERTENSION: ICD-10-CM

## 2024-01-08 DIAGNOSIS — Z00.00 MEDICARE ANNUAL WELLNESS VISIT, SUBSEQUENT: Primary | ICD-10-CM

## 2024-01-08 DIAGNOSIS — J42 CHRONIC BRONCHITIS, UNSPECIFIED CHRONIC BRONCHITIS TYPE (HCC): Primary | ICD-10-CM

## 2024-01-08 LAB
ALBUMIN SERPL-MCNC: 4.3 G/DL (ref 3.5–4.6)
ALP SERPL-CCNC: 93 U/L (ref 40–130)
ALT SERPL-CCNC: 14 U/L (ref 0–33)
ANION GAP SERPL CALCULATED.3IONS-SCNC: 12 MEQ/L (ref 9–15)
AST SERPL-CCNC: 17 U/L (ref 0–35)
BASOPHILS # BLD: 0 K/UL (ref 0–0.2)
BASOPHILS NFR BLD: 0.6 %
BILIRUB SERPL-MCNC: 0.6 MG/DL (ref 0.2–0.7)
BNP BLD-MCNC: 326 PG/ML
BUN SERPL-MCNC: 20 MG/DL (ref 8–23)
CALCIUM SERPL-MCNC: 9.3 MG/DL (ref 8.5–9.9)
CHLORIDE SERPL-SCNC: 100 MEQ/L (ref 95–107)
CHOLEST SERPL-MCNC: 133 MG/DL (ref 0–199)
CO2 SERPL-SCNC: 28 MEQ/L (ref 20–31)
CREAT SERPL-MCNC: 0.58 MG/DL (ref 0.5–0.9)
EOSINOPHIL # BLD: 0.1 K/UL (ref 0–0.7)
EOSINOPHIL NFR BLD: 1.6 %
ERYTHROCYTE [DISTWIDTH] IN BLOOD BY AUTOMATED COUNT: 13.6 % (ref 11.5–14.5)
GLOBULIN SER CALC-MCNC: 2.9 G/DL (ref 2.3–3.5)
GLUCOSE SERPL-MCNC: 94 MG/DL (ref 70–99)
HCT VFR BLD AUTO: 45.7 % (ref 37–47)
HDLC SERPL-MCNC: 45 MG/DL (ref 40–59)
HGB BLD-MCNC: 15.1 G/DL (ref 12–16)
LDLC SERPL CALC-MCNC: 73 MG/DL (ref 0–129)
LYMPHOCYTES # BLD: 0.7 K/UL (ref 1–4.8)
LYMPHOCYTES NFR BLD: 10.5 %
MCH RBC QN AUTO: 29.9 PG (ref 27–31.3)
MCHC RBC AUTO-ENTMCNC: 33 % (ref 33–37)
MCV RBC AUTO: 90.5 FL (ref 79.4–94.8)
MONOCYTES # BLD: 0.5 K/UL (ref 0.2–0.8)
MONOCYTES NFR BLD: 8.4 %
NEUTROPHILS # BLD: 4.9 K/UL (ref 1.4–6.5)
NEUTS SEG NFR BLD: 78.6 %
PLATELET # BLD AUTO: 266 K/UL (ref 130–400)
POTASSIUM SERPL-SCNC: 3.6 MEQ/L (ref 3.4–4.9)
PROT SERPL-MCNC: 7.2 G/DL (ref 6.3–8)
RBC # BLD AUTO: 5.05 M/UL (ref 4.2–5.4)
SODIUM SERPL-SCNC: 140 MEQ/L (ref 135–144)
T4 FREE SERPL-MCNC: 1.53 NG/DL (ref 0.84–1.68)
TRIGL SERPL-MCNC: 73 MG/DL (ref 0–150)
TSH SERPL-MCNC: 3.08 UIU/ML (ref 0.44–3.86)
WBC # BLD AUTO: 6.2 K/UL (ref 4.8–10.8)

## 2024-01-08 PROCEDURE — 99214 OFFICE O/P EST MOD 30 MIN: CPT | Performed by: PHYSICIAN ASSISTANT

## 2024-01-08 PROCEDURE — 93000 ELECTROCARDIOGRAM COMPLETE: CPT | Performed by: PHYSICIAN ASSISTANT

## 2024-01-08 PROCEDURE — 3023F SPIROM DOC REV: CPT | Performed by: PHYSICIAN ASSISTANT

## 2024-01-08 PROCEDURE — 1123F ACP DISCUSS/DSCN MKR DOCD: CPT | Performed by: PHYSICIAN ASSISTANT

## 2024-01-08 PROCEDURE — G8417 CALC BMI ABV UP PARAM F/U: HCPCS | Performed by: PHYSICIAN ASSISTANT

## 2024-01-08 PROCEDURE — G0439 PPPS, SUBSEQ VISIT: HCPCS | Performed by: PHYSICIAN ASSISTANT

## 2024-01-08 PROCEDURE — G8484 FLU IMMUNIZE NO ADMIN: HCPCS | Performed by: PHYSICIAN ASSISTANT

## 2024-01-08 PROCEDURE — 1036F TOBACCO NON-USER: CPT | Performed by: PHYSICIAN ASSISTANT

## 2024-01-08 PROCEDURE — G8427 DOCREV CUR MEDS BY ELIG CLIN: HCPCS | Performed by: PHYSICIAN ASSISTANT

## 2024-01-08 PROCEDURE — 1090F PRES/ABSN URINE INCON ASSESS: CPT | Performed by: PHYSICIAN ASSISTANT

## 2024-01-08 PROCEDURE — 81002 URINALYSIS NONAUTO W/O SCOPE: CPT | Performed by: PHYSICIAN ASSISTANT

## 2024-01-08 RX ORDER — NYSTATIN 100000 [USP'U]/G
POWDER TOPICAL
Qty: 60 G | Refills: 2 | Status: SHIPPED | OUTPATIENT
Start: 2024-01-08

## 2024-01-08 RX ORDER — CLOTRIMAZOLE AND BETAMETHASONE DIPROPIONATE 10; .64 MG/G; MG/G
CREAM TOPICAL
Qty: 45 G | Refills: 2 | Status: SHIPPED | OUTPATIENT
Start: 2024-01-08

## 2024-01-08 ASSESSMENT — ENCOUNTER SYMPTOMS
WHEEZING: 0
DIARRHEA: 0
COUGH: 0
CHEST TIGHTNESS: 0
COLOR CHANGE: 0
ABDOMINAL DISTENTION: 0
NAUSEA: 0
ABDOMINAL PAIN: 0
BACK PAIN: 1
CONSTIPATION: 0
TROUBLE SWALLOWING: 0
BLOOD IN STOOL: 0
SHORTNESS OF BREATH: 1

## 2024-01-08 ASSESSMENT — PATIENT HEALTH QUESTIONNAIRE - PHQ9
SUM OF ALL RESPONSES TO PHQ QUESTIONS 1-9: 1
2. FEELING DOWN, DEPRESSED OR HOPELESS: 1
SUM OF ALL RESPONSES TO PHQ9 QUESTIONS 1 & 2: 1
SUM OF ALL RESPONSES TO PHQ QUESTIONS 1-9: 1
1. LITTLE INTEREST OR PLEASURE IN DOING THINGS: 0

## 2024-01-08 ASSESSMENT — LIFESTYLE VARIABLES
HOW MANY STANDARD DRINKS CONTAINING ALCOHOL DO YOU HAVE ON A TYPICAL DAY: PATIENT DOES NOT DRINK
HOW OFTEN DO YOU HAVE A DRINK CONTAINING ALCOHOL: NEVER

## 2024-01-08 NOTE — PROGRESS NOTES
Medicare Annual Wellness Visit    Dennise Sosa is here for Medicare AWV    Assessment & Plan   Medicare annual wellness visit, subsequent  Recommendations for Preventive Services Due: see orders and patient instructions/AVS.  Recommended screening schedule for the next 5-10 years is provided to the patient in written form: see Patient Instructions/AVS.     No follow-ups on file.     Subjective     Patient's complete Health Risk Assessment and screening values have been reviewed and are found in Flowsheets. The following problems were reviewed today and where indicated follow up appointments were made and/or referrals ordered.    Positive Risk Factor Screenings with Interventions:    Fall Risk:  Do you feel unsteady or are you worried about falling? : (!) yes  2 or more falls in past year?: (!) yes  Fall with injury in past year?: no     Interventions:    Reviewed medications, home hazards, visual acuity, and co-morbidities that can increase risk for falls  Patient declines any further evaluation or treatment            General HRA Questions:  Select all that apply: (!) New or Increased Pain, New or Increased Fatigue, Loneliness, Stress, Anger    Pain Interventions:  Patient declined any further interventions or treatment    Fatigue Interventions:  Labs today.     Loneliness Interventions:  Son lives with patient, family checks on her frequently.     Stress Interventions:  Patient declined any further interventions or treatment    Anger Interventions:  Patient declined any further interventions or treatment      Activity, Diet, and Weight:  On average, how many days per week do you engage in moderate to strenuous exercise (like a brisk walk)?: 0 days  On average, how many minutes do you engage in exercise at this level?: 0 min    Do you eat balanced/healthy meals regularly?: Yes    Body mass index is 30.51 kg/m². (!) Abnormal      Inactivity Interventions:  Patient declined any further interventions or

## 2024-01-08 NOTE — PROGRESS NOTES
Subjective  Denniserhea Sosa, 89 y.o. female presents today with:  Chief Complaint   Patient presents with    Follow-up     General follow up     Urinary Frequency     States she is constantly having to urinate through out the day      HPI   In the office today for follow up.  Last OV with me: 4/28/22.    Not leaving the house.   Very tired.  Urinary frequency, leakage.  Able to leave sample today.      Rash of bra and groin.  Very itchy, painful.  She has had chronically.  Very red, odor.     C/o increased fatigue, SOB w/ exertion, edema of LEs.   Complaint with her medications.  No follow up with pulmonology or cardiology.   No recent falls.  Due for labs.   Intermittent dizziness, but nothing that is worsening.    Had cardiac work-up in 2020.  Holter monitor showed bundle branch block and PVCs.    Echocardiogram with EF of 60%.    Some restrictive filling patterns.   CTA abdomen with runoff ordered, she did not have this completed.      Fatigue.  Increased.   3L oxygen at night.  No CPAP.  C/o feeling tired constantly.   Has noticed increased hair loss.   NO chest pain.     Chronic joint pain.  Stable.  Voltaren gel and oral nsaid has helped.   Reports worsening bilateral knee and hip pain.  History of arthritis with L TKA x 2.    Stiffness and discomfort.   Ambulating with rollator.  Cold/damp weather makes symptoms worse.    Did no see ortho.    Lives with son.  'good and bad days'.    Review of Systems   Constitutional:  Positive for activity change and fatigue. Negative for appetite change, chills, diaphoresis, fever and unexpected weight change.   HENT:  Negative for congestion (seasonal), postnasal drip and trouble swallowing.    Respiratory:  Positive for shortness of breath (w/ exertion). Negative for cough, chest tightness and wheezing.    Cardiovascular:  Negative for chest pain, palpitations and leg swelling.   Gastrointestinal:  Negative for abdominal distention, abdominal pain, blood in stool,

## 2024-01-09 LAB
FOLATE: >20 NG/ML
VITAMIN B-12: 1599 PG/ML (ref 232–1245)

## 2024-01-11 LAB — VIT B1 SERPL-MCNC: 11 NMOL/L (ref 4–15)

## 2024-01-28 DIAGNOSIS — H93.8X1 SENSATION OF FULLNESS IN RIGHT EAR: ICD-10-CM

## 2024-01-28 DIAGNOSIS — Z76.0 MEDICATION REFILL: ICD-10-CM

## 2024-01-31 ENCOUNTER — OFFICE VISIT (OUTPATIENT)
Dept: PALLATIVE CARE | Age: 89
End: 2024-01-31
Payer: MEDICARE

## 2024-01-31 VITALS
HEART RATE: 61 BPM | OXYGEN SATURATION: 95 % | RESPIRATION RATE: 16 BRPM | SYSTOLIC BLOOD PRESSURE: 132 MMHG | TEMPERATURE: 99.2 F | DIASTOLIC BLOOD PRESSURE: 78 MMHG

## 2024-01-31 DIAGNOSIS — M79.605 PAIN IN BOTH LOWER EXTREMITIES: ICD-10-CM

## 2024-01-31 DIAGNOSIS — M15.9 PRIMARY OSTEOARTHRITIS INVOLVING MULTIPLE JOINTS: Primary | ICD-10-CM

## 2024-01-31 DIAGNOSIS — R30.0 DYSURIA: ICD-10-CM

## 2024-01-31 DIAGNOSIS — G62.9 NEUROPATHY: ICD-10-CM

## 2024-01-31 DIAGNOSIS — Z71.89 ADVANCED CARE PLANNING/COUNSELING DISCUSSION: ICD-10-CM

## 2024-01-31 DIAGNOSIS — M25.472 ANKLE EDEMA, BILATERAL: ICD-10-CM

## 2024-01-31 DIAGNOSIS — Z51.5 PALLIATIVE CARE ENCOUNTER: ICD-10-CM

## 2024-01-31 DIAGNOSIS — M25.471 ANKLE EDEMA, BILATERAL: ICD-10-CM

## 2024-01-31 DIAGNOSIS — R53.81 PHYSICAL DEBILITY: ICD-10-CM

## 2024-01-31 DIAGNOSIS — M79.604 PAIN IN BOTH LOWER EXTREMITIES: ICD-10-CM

## 2024-01-31 DIAGNOSIS — Z71.89 GOALS OF CARE, COUNSELING/DISCUSSION: ICD-10-CM

## 2024-01-31 PROCEDURE — G8417 CALC BMI ABV UP PARAM F/U: HCPCS

## 2024-01-31 PROCEDURE — 1090F PRES/ABSN URINE INCON ASSESS: CPT

## 2024-01-31 PROCEDURE — 99345 HOME/RES VST NEW HIGH MDM 75: CPT

## 2024-01-31 PROCEDURE — G8484 FLU IMMUNIZE NO ADMIN: HCPCS

## 2024-01-31 PROCEDURE — 1123F ACP DISCUSS/DSCN MKR DOCD: CPT

## 2024-01-31 PROCEDURE — 1036F TOBACCO NON-USER: CPT

## 2024-01-31 RX ORDER — SENNOSIDES 8.6 MG
1300 CAPSULE ORAL EVERY 8 HOURS PRN
COMMUNITY

## 2024-01-31 RX ORDER — DOCUSATE SODIUM 100 MG/1
100 CAPSULE, LIQUID FILLED ORAL DAILY PRN
COMMUNITY

## 2024-01-31 RX ORDER — POLYETHYLENE GLYCOL 3350 17 G/17G
17 POWDER, FOR SOLUTION ORAL DAILY PRN
COMMUNITY

## 2024-01-31 RX ORDER — FLUTICASONE PROPIONATE 50 MCG
SPRAY, SUSPENSION (ML) NASAL
Qty: 16 G | Refills: 1 | OUTPATIENT
Start: 2024-01-31

## 2024-01-31 RX ORDER — ASPIRIN 81 MG/1
81 TABLET ORAL DAILY
COMMUNITY

## 2024-01-31 RX ORDER — GABAPENTIN 100 MG/1
100 CAPSULE ORAL NIGHTLY
Qty: 30 CAPSULE | Refills: 0 | Status: SHIPPED | OUTPATIENT
Start: 2024-01-31 | End: 2024-03-01

## 2024-01-31 RX ORDER — ROSUVASTATIN CALCIUM 10 MG/1
TABLET, COATED ORAL
Qty: 90 TABLET | Refills: 1 | OUTPATIENT
Start: 2024-01-31

## 2024-01-31 ASSESSMENT — PATIENT HEALTH QUESTIONNAIRE - PHQ9
SUM OF ALL RESPONSES TO PHQ QUESTIONS 1-9: 1
1. LITTLE INTEREST OR PLEASURE IN DOING THINGS: 0
2. FEELING DOWN, DEPRESSED OR HOPELESS: 1
SUM OF ALL RESPONSES TO PHQ QUESTIONS 1-9: 1
SUM OF ALL RESPONSES TO PHQ QUESTIONS 1-9: 1
SUM OF ALL RESPONSES TO PHQ9 QUESTIONS 1 & 2: 1
SUM OF ALL RESPONSES TO PHQ QUESTIONS 1-9: 1

## 2024-01-31 ASSESSMENT — ENCOUNTER SYMPTOMS
COLOR CHANGE: 1
SHORTNESS OF BREATH: 0
BACK PAIN: 1
GASTROINTESTINAL NEGATIVE: 1

## 2024-01-31 NOTE — PROGRESS NOTES
Subjective:      Patient Id: Seen Dennise at  home in [place] , for initial Palliative Care consult for symptom management, advance care planning, and goals of care discussion.  She was accompanied to the appointment by: self.    Chief Complaint   Patient presents with    Referral - General    Joint Pain      HPI       Dennise Sosa is a 89 y.o. female referred to palliative care by YUE Tapia for symptom management related to chronic bronchitis, advance care planning, and goals of care conversation. Dennise has complex medical history that includes vertigo, diverticulosis, hiatal hernia, HTN, hypothyroidism, HLD, DHARA, obesity, OA, weakness.     Patient complains of arthritis.  Patient follows with PCP.    General: Upon entering the room I find the patient A&O X4 up ambulating with Rollator, calm, cooperative and in NAD.    Pain: Patient states she is having a bad pain day and experiencing increased time for mobility.  Patient reports pain in her joints. Rates the pain at a Pain Score:  10 - Worst pain ever on a scale of 0 to 10. Describes pain as continuous, throbbing, burning/fire. Alleviating factors include laying down and medication. Pain is worse in the morning time of day.  Patient states that the pain does radiate to her lower legs. Other symptoms when pain occurs are anorexia. Current pain medications include Valsartan, diclofenac and Tylenol. No sedation, constipation, or adverse effects on current regimen.     Appetite: Patient denies any decreased appetite or unintentional weight loss.    Mood: Patient denies any increased sadness, depression, SI or HI during interaction.  Patient states she takes prescribed Zoloft as recommended/advised.    Sleep: Patient denies any issues with sleep/insomnia and feels rested upon awakening.    Skin: Patient denies any current wounds, open areas or delayed wound healing during interaction.  However, patient has bilateral lower extremity edema with

## 2024-02-27 ASSESSMENT — ENCOUNTER SYMPTOMS
BACK PAIN: 1
GASTROINTESTINAL NEGATIVE: 1
SHORTNESS OF BREATH: 0
COLOR CHANGE: 1

## 2024-02-27 NOTE — PROGRESS NOTES
patient later stated in the conversation that she has not been consecutively taking medication as she often falls asleep before recommended dosing time.  -Encouraged patient to take medication daily anytime between noon and 5 PM in effort to improve medication adherence to receive potential benefit.  Patient states understanding and she will now take medication earlier in the day before she falls asleep.  -Palliative care will continue to monitor.    3. Physical debility  -Patient admits to another recent fall without injury per conversation and requiring her son to come help her up.  -Offered additional home health care with therapy, patient declined.  -Encouraged patient to continue with previously given exercises and range of motion to improve quality of life, patient states understanding.  -Encouraged patient to take previously ordered medications to aid with pain to allow patient to exercise and perform range of motion stretching.  -Palliative care will continue to monitor.    4. Goals of care, counseling/discussion  -Expressed concern for patient's current living situation with frequent falls and inability to care for self and property.  Patient states that she has already started looking into/considering moving related to this issue.  Patient also admits that her landlord is selling the property and she has to move for various reasons.  -Encouraged patient to work with her family for a new living situation that is affordable and to utilize all community-based resources.  Encouraged patient to reach out to her insurance company for additional benefits.  -Hold palliative care staff to assist patient with current physical debility to help with household chores/responsibilities.  Palliative care staff will enroll patient in additional resources that are available and call patient with information.  -Palliative care will continue to monitor.    5. Palliative care encounter  -Discussed symptom management related to

## 2024-02-28 ENCOUNTER — OFFICE VISIT (OUTPATIENT)
Dept: PALLATIVE CARE | Age: 89
End: 2024-02-28
Payer: MEDICARE

## 2024-02-28 VITALS
SYSTOLIC BLOOD PRESSURE: 138 MMHG | TEMPERATURE: 99.2 F | RESPIRATION RATE: 18 BRPM | HEART RATE: 85 BPM | DIASTOLIC BLOOD PRESSURE: 82 MMHG | OXYGEN SATURATION: 95 %

## 2024-02-28 DIAGNOSIS — Z51.5 PALLIATIVE CARE ENCOUNTER: ICD-10-CM

## 2024-02-28 DIAGNOSIS — M15.9 PRIMARY OSTEOARTHRITIS INVOLVING MULTIPLE JOINTS: Primary | ICD-10-CM

## 2024-02-28 DIAGNOSIS — Z71.89 GOALS OF CARE, COUNSELING/DISCUSSION: ICD-10-CM

## 2024-02-28 DIAGNOSIS — R53.81 PHYSICAL DEBILITY: ICD-10-CM

## 2024-02-28 DIAGNOSIS — G62.9 NEUROPATHY: ICD-10-CM

## 2024-02-28 PROCEDURE — 99349 HOME/RES VST EST MOD MDM 40: CPT

## 2024-02-28 PROCEDURE — 1036F TOBACCO NON-USER: CPT

## 2024-02-28 PROCEDURE — 1123F ACP DISCUSS/DSCN MKR DOCD: CPT

## 2024-02-28 PROCEDURE — G8417 CALC BMI ABV UP PARAM F/U: HCPCS

## 2024-02-28 PROCEDURE — G8484 FLU IMMUNIZE NO ADMIN: HCPCS

## 2024-02-28 PROCEDURE — 1090F PRES/ABSN URINE INCON ASSESS: CPT

## 2024-04-02 ASSESSMENT — ENCOUNTER SYMPTOMS
GASTROINTESTINAL NEGATIVE: 1
BACK PAIN: 1
SHORTNESS OF BREATH: 0

## 2024-04-02 NOTE — PROGRESS NOTES
hardware.  It may contain errors related to the system, including grammar, punctuation and spelling as well as words and phrases that may seem inaccurate.  For any questions or concerns feel free to contact me for clarification.

## 2024-04-03 ENCOUNTER — OFFICE VISIT (OUTPATIENT)
Dept: PALLATIVE CARE | Age: 89
End: 2024-04-03
Payer: MEDICARE

## 2024-04-03 VITALS
DIASTOLIC BLOOD PRESSURE: 78 MMHG | TEMPERATURE: 99 F | HEART RATE: 82 BPM | OXYGEN SATURATION: 98 % | SYSTOLIC BLOOD PRESSURE: 132 MMHG

## 2024-04-03 DIAGNOSIS — Z51.5 PALLIATIVE CARE ENCOUNTER: ICD-10-CM

## 2024-04-03 DIAGNOSIS — R32 INCONTINENCE IN FEMALE: ICD-10-CM

## 2024-04-03 DIAGNOSIS — G62.9 NEUROPATHY: ICD-10-CM

## 2024-04-03 DIAGNOSIS — M15.9 PRIMARY OSTEOARTHRITIS INVOLVING MULTIPLE JOINTS: ICD-10-CM

## 2024-04-03 DIAGNOSIS — N32.81 OVERACTIVE BLADDER: Primary | ICD-10-CM

## 2024-04-03 PROCEDURE — 1036F TOBACCO NON-USER: CPT

## 2024-04-03 PROCEDURE — 1123F ACP DISCUSS/DSCN MKR DOCD: CPT

## 2024-04-03 PROCEDURE — 1090F PRES/ABSN URINE INCON ASSESS: CPT

## 2024-04-03 PROCEDURE — G8417 CALC BMI ABV UP PARAM F/U: HCPCS

## 2024-04-03 PROCEDURE — 0509F URINE INCON PLAN DOCD: CPT

## 2024-04-03 PROCEDURE — 99349 HOME/RES VST EST MOD MDM 40: CPT

## 2024-04-03 RX ORDER — OXYBUTYNIN CHLORIDE 5 MG/1
2.5 TABLET ORAL 2 TIMES DAILY
Qty: 90 TABLET | Refills: 0 | Status: SHIPPED | OUTPATIENT
Start: 2024-04-03 | End: 2024-07-02

## 2024-04-03 RX ORDER — GABAPENTIN 100 MG/1
100 CAPSULE ORAL 2 TIMES DAILY
Qty: 180 CAPSULE | Refills: 0 | Status: SHIPPED | OUTPATIENT
Start: 2024-04-03 | End: 2024-07-02

## 2024-04-23 DIAGNOSIS — H93.8X1 SENSATION OF FULLNESS IN RIGHT EAR: ICD-10-CM

## 2024-04-23 DIAGNOSIS — Z76.0 MEDICATION REFILL: ICD-10-CM

## 2024-04-23 RX ORDER — FUROSEMIDE 40 MG/1
40 TABLET ORAL DAILY
Qty: 30 TABLET | Refills: 6 | OUTPATIENT
Start: 2024-04-23

## 2024-04-25 NOTE — TELEPHONE ENCOUNTER
Comments: Please advise    Last Office Visit (last PCP visit):   1/8/2024    Next Visit Date:  Future Appointments   Date Time Provider Department Center   4/30/2024 12:30 PM Sam Alva APRN - CNP [facility]       **If hasn't been seen in over a year OR hasn't followed up according to last diabetes/ADHD visit, make appointment for patient before sending refill to provider.    Rx requested:  Requested Prescriptions     Pending Prescriptions Disp Refills    sertraline (ZOLOFT) 50 MG tablet [Pharmacy Med Name: sertraline 50 mg tablet] 90 tablet 1     Sig: TAKE 1 TABLET BY MOUTH EVERY EVENING    fluticasone (FLONASE) 50 MCG/ACT nasal spray [Pharmacy Med Name: fluticasone propionate 50 mcg/actuation nasal spray,suspension] 16 g 1     Sig: INHALE 2 sprays IN EACH NOSTRIL TWICE DAILY AS NEEDED for congestion    rosuvastatin (CRESTOR) 10 MG tablet [Pharmacy Med Name: rosuvastatin 10 mg tablet] 90 tablet 1     Sig: TAKE 1 TABLET BY MOUTH EVERY DAY

## 2024-04-26 RX ORDER — FLUTICASONE PROPIONATE 50 MCG
SPRAY, SUSPENSION (ML) NASAL
Qty: 16 G | Refills: 1 | Status: SHIPPED | OUTPATIENT
Start: 2024-04-26

## 2024-04-26 RX ORDER — ROSUVASTATIN CALCIUM 10 MG/1
TABLET, COATED ORAL
Qty: 90 TABLET | Refills: 1 | Status: SHIPPED | OUTPATIENT
Start: 2024-04-26

## 2024-04-29 DIAGNOSIS — I10 ESSENTIAL HYPERTENSION: ICD-10-CM

## 2024-04-29 DIAGNOSIS — Z76.0 MEDICATION REFILL: ICD-10-CM

## 2024-04-29 ASSESSMENT — ENCOUNTER SYMPTOMS
GASTROINTESTINAL NEGATIVE: 1
BACK PAIN: 1
COLOR CHANGE: 1
SHORTNESS OF BREATH: 0

## 2024-04-29 NOTE — PROGRESS NOTES
or HI during interaction.  Patient also denied wanting additional medication to aid with this issue during interaction.  -Patient expressed concern as her oxygen concentrator intermittently stops working and she has not received a portable oxygen concentrator.  Reviewed patient's SpO2 after walking a long distance of 97%, encouraged her to call company who delivered home oxygen concentrator related to machine not working properly and follow-up with her PCP for portable oxygen concentrator qualification.  Spent much time and provided much education on this issue during interaction.  All questions and concerns were addressed to patient's satisfaction during interaction.    3. Palliative care encounter  -Discussed symptom management related to chronic disease/condition. Provided emotional support and active listening. Patient understands and is agreeable to current plan.  -Palliative care will continue to follow and work with patient.  - Misc. Devices (PILL SPLITTER) MISC; 1 each by Does not apply route once for 1 dose  Dispense: 1 each; Refill: 0      There are no discontinued medications.      Discussed with patient/surrogate: POC, Treatment risks/benefits, and alternatives. All questions were answered. Additionally, a printed copy of the CDC medications/opioid safety informational sheet was reviewed and given to patient for reference. Opioid contract signed: No    Due to acuity, symptomatology and high-risk medication management, I advised patient to Return in 2 months (on 7/3/2024).     Thanks for the opportunity you have allowed us to provide palliative care to Dennise. We will be in touch as care progresses. Please feel free to reach out to us should you have any questions or requests.    Total Time  45 mins     Total time includes reviewing labs and tests, reviewing history, medications, and allergies, counseling patient, performing medically appropriate evaluation and examination, ordering medications, and

## 2024-04-30 ENCOUNTER — OFFICE VISIT (OUTPATIENT)
Dept: PALLATIVE CARE | Age: 89
End: 2024-04-30
Payer: MEDICARE

## 2024-04-30 VITALS
SYSTOLIC BLOOD PRESSURE: 126 MMHG | HEART RATE: 78 BPM | DIASTOLIC BLOOD PRESSURE: 68 MMHG | OXYGEN SATURATION: 96 % | TEMPERATURE: 98.5 F | RESPIRATION RATE: 18 BRPM

## 2024-04-30 DIAGNOSIS — Z71.89 GOALS OF CARE, COUNSELING/DISCUSSION: ICD-10-CM

## 2024-04-30 DIAGNOSIS — Z51.5 PALLIATIVE CARE ENCOUNTER: ICD-10-CM

## 2024-04-30 DIAGNOSIS — N32.81 OVERACTIVE BLADDER: Primary | ICD-10-CM

## 2024-04-30 PROCEDURE — 1123F ACP DISCUSS/DSCN MKR DOCD: CPT

## 2024-04-30 PROCEDURE — 99349 HOME/RES VST EST MOD MDM 40: CPT

## 2024-04-30 PROCEDURE — 1090F PRES/ABSN URINE INCON ASSESS: CPT

## 2024-04-30 PROCEDURE — G8417 CALC BMI ABV UP PARAM F/U: HCPCS

## 2024-04-30 PROCEDURE — 1036F TOBACCO NON-USER: CPT

## 2024-04-30 RX ORDER — ACETAMINOPHEN 80 MG
1 TABLET,CHEWABLE ORAL ONCE
Qty: 1 EACH | Refills: 0 | Status: SHIPPED | OUTPATIENT
Start: 2024-04-30 | End: 2024-04-30

## 2024-04-30 ASSESSMENT — PATIENT HEALTH QUESTIONNAIRE - PHQ9
SUM OF ALL RESPONSES TO PHQ9 QUESTIONS 1 & 2: 2
SUM OF ALL RESPONSES TO PHQ QUESTIONS 1-9: 2
1. LITTLE INTEREST OR PLEASURE IN DOING THINGS: SEVERAL DAYS
SUM OF ALL RESPONSES TO PHQ QUESTIONS 1-9: 2
2. FEELING DOWN, DEPRESSED OR HOPELESS: SEVERAL DAYS
SUM OF ALL RESPONSES TO PHQ QUESTIONS 1-9: 2
SUM OF ALL RESPONSES TO PHQ QUESTIONS 1-9: 2

## 2024-05-01 NOTE — TELEPHONE ENCOUNTER
Comments: How often are you seeing pt?     Last Office Visit (last PCP visit):   1/8/2024    Next Visit Date:  Future Appointments   Date Time Provider Department Center   7/3/2024  2:00 PM Bursley, Sam, APRN - CNP [facility]       **If hasn't been seen in over a year OR hasn't followed up according to last diabetes/ADHD visit, make appointment for patient before sending refill to provider.    Rx requested:  Requested Prescriptions     Pending Prescriptions Disp Refills    pantoprazole (PROTONIX) 40 MG tablet [Pharmacy Med Name: pantoprazole 40 mg tablet,delayed release] 90 tablet 1     Sig: TAKE 1 TABLET BY MOUTH DAILY AS NEEDED for heartburn    valsartan (DIOVAN) 160 MG tablet [Pharmacy Med Name: valsartan 160 mg tablet] 180 tablet 1     Sig: TAKE 2 TABLETS BY MOUTH NIGHTLY    levothyroxine (SYNTHROID) 50 MCG tablet [Pharmacy Med Name: levothyroxine 50 mcg tablet] 90 tablet 1     Sig: TAKE 1 TABLET BY MOUTH EVERY MORNING before breakfast

## 2024-05-02 RX ORDER — PANTOPRAZOLE SODIUM 40 MG/1
TABLET, DELAYED RELEASE ORAL
Qty: 90 TABLET | Refills: 1 | Status: SHIPPED | OUTPATIENT
Start: 2024-05-02

## 2024-05-02 RX ORDER — LEVOTHYROXINE SODIUM 0.05 MG/1
TABLET ORAL
Qty: 90 TABLET | Refills: 1 | Status: SHIPPED | OUTPATIENT
Start: 2024-05-02

## 2024-05-02 RX ORDER — VALSARTAN 160 MG/1
TABLET ORAL
Qty: 180 TABLET | Refills: 1 | Status: SHIPPED | OUTPATIENT
Start: 2024-05-02

## 2024-06-23 DIAGNOSIS — G62.9 NEUROPATHY: ICD-10-CM

## 2024-06-23 DIAGNOSIS — N32.81 OVERACTIVE BLADDER: ICD-10-CM

## 2024-06-23 DIAGNOSIS — R32 INCONTINENCE IN FEMALE: ICD-10-CM

## 2024-06-23 DIAGNOSIS — Z51.5 PALLIATIVE CARE ENCOUNTER: ICD-10-CM

## 2024-06-24 DIAGNOSIS — G62.9 NEUROPATHY: Primary | ICD-10-CM

## 2024-06-24 DIAGNOSIS — Z51.5 PALLIATIVE CARE ENCOUNTER: ICD-10-CM

## 2024-06-24 RX ORDER — OXYBUTYNIN CHLORIDE 5 MG/1
TABLET ORAL
Qty: 90 TABLET | Refills: 3 | OUTPATIENT
Start: 2024-06-24

## 2024-06-24 RX ORDER — GABAPENTIN 100 MG/1
100 CAPSULE ORAL 2 TIMES DAILY
Qty: 180 CAPSULE | Refills: 1 | Status: SHIPPED | OUTPATIENT
Start: 2024-06-24 | End: 2024-12-21

## 2024-06-24 RX ORDER — GABAPENTIN 100 MG/1
100 CAPSULE ORAL 2 TIMES DAILY
Qty: 180 CAPSULE | Refills: 3 | OUTPATIENT
Start: 2024-06-24

## 2024-06-24 NOTE — TELEPHONE ENCOUNTER
Rx requested:  Requested Prescriptions     Pending Prescriptions Disp Refills    gabapentin (NEURONTIN) 100 MG capsule 180 capsule 0     Sig: Take 1 capsule by mouth in the morning and at bedtime for 90 days.       Last Office Visit:   4/30/2024      Last filled:  4/3/24    Next Visit Date:  Future Appointments   Date Time Provider Department Center   7/3/2024  2:00 PM Sam Alva APRN - CNP [facility]

## 2024-07-03 ENCOUNTER — APPOINTMENT (OUTPATIENT)
Dept: GENERAL RADIOLOGY | Age: 89
End: 2024-07-03
Payer: MEDICARE

## 2024-07-03 ENCOUNTER — HOSPITAL ENCOUNTER (INPATIENT)
Age: 89
LOS: 2 days | Discharge: HOME | End: 2024-07-05
Attending: INTERNAL MEDICINE | Admitting: INTERNAL MEDICINE
Payer: MEDICARE

## 2024-07-03 ENCOUNTER — OFFICE VISIT (OUTPATIENT)
Dept: PALLATIVE CARE | Age: 89
End: 2024-07-03

## 2024-07-03 VITALS
SYSTOLIC BLOOD PRESSURE: 122 MMHG | RESPIRATION RATE: 22 BRPM | HEART RATE: 120 BPM | DIASTOLIC BLOOD PRESSURE: 68 MMHG | TEMPERATURE: 102.3 F | OXYGEN SATURATION: 95 %

## 2024-07-03 DIAGNOSIS — R53.1 GENERAL WEAKNESS: ICD-10-CM

## 2024-07-03 DIAGNOSIS — I48.0 PAROXYSMAL ATRIAL FIBRILLATION (HCC): ICD-10-CM

## 2024-07-03 DIAGNOSIS — R63.0 ANOREXIA: ICD-10-CM

## 2024-07-03 DIAGNOSIS — R26.2 INABILITY TO AMBULATE DUE TO ANKLE OR FOOT: ICD-10-CM

## 2024-07-03 DIAGNOSIS — R50.9 FEVER, UNSPECIFIED FEVER CAUSE: ICD-10-CM

## 2024-07-03 DIAGNOSIS — R53.1 WEAKNESS: ICD-10-CM

## 2024-07-03 DIAGNOSIS — Z51.5 PALLIATIVE CARE ENCOUNTER: ICD-10-CM
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
## 2024-07-03 DIAGNOSIS — S93.401A SPRAIN OF RIGHT ANKLE, UNSPECIFIED LIGAMENT, INITIAL ENCOUNTER: ICD-10-CM

## 2024-07-03 DIAGNOSIS — R61 DIAPHORESIS: ICD-10-CM

## 2024-07-03 DIAGNOSIS — I48.91 ATRIAL FIBRILLATION, UNSPECIFIED TYPE (HCC): Primary | ICD-10-CM

## 2024-07-03 PROBLEM — R39.89 SUSPECTED URINARY TRACT INFECTION: Status: ACTIVE | Noted: 2024-07-03

## 2024-07-03 LAB
ALBUMIN SERPL-MCNC: 3.6 G/DL (ref 3.5–4.6)
ALP SERPL-CCNC: 81 U/L (ref 40–130)
ALT SERPL-CCNC: 9 U/L (ref 0–33)
AMORPH SED URNS QL MICRO: ABNORMAL
ANION GAP SERPL CALCULATED.3IONS-SCNC: 15 MEQ/L (ref 9–15)
AST SERPL-CCNC: 15 U/L (ref 0–35)
B PARAP IS1001 DNA NPH QL NAA+NON-PROBE: NOT DETECTED
B PERT.PT PRMT NPH QL NAA+NON-PROBE: NOT DETECTED
BACTERIA URNS QL MICRO: ABNORMAL /HPF
BASOPHILS # BLD: 0 K/UL (ref 0–0.2)
BASOPHILS NFR BLD: 0.5 %
BILIRUB SERPL-MCNC: 1 MG/DL (ref 0.2–0.7)
BILIRUB UR QL STRIP: ABNORMAL
BNP BLD-MCNC: 864 PG/ML
BUN SERPL-MCNC: 16 MG/DL (ref 8–23)
C PNEUM DNA NPH QL NAA+NON-PROBE: NOT DETECTED
CALCIUM SERPL-MCNC: 9.2 MG/DL (ref 8.5–9.9)
CHLORIDE SERPL-SCNC: 95 MEQ/L (ref 95–107)
CLARITY UR: ABNORMAL
CO2 SERPL-SCNC: 24 MEQ/L (ref 20–31)
COLOR UR: ABNORMAL
CREAT SERPL-MCNC: 0.58 MG/DL (ref 0.5–0.9)
EOSINOPHIL # BLD: 0 K/UL (ref 0–0.7)
EOSINOPHIL NFR BLD: 0.1 %
EPI CELLS #/AREA URNS AUTO: ABNORMAL /HPF (ref 0–5)
ERYTHROCYTE [DISTWIDTH] IN BLOOD BY AUTOMATED COUNT: 13.1 % (ref 11.5–14.5)
FLUAV RNA NPH QL NAA+NON-PROBE: NOT DETECTED
FLUBV RNA NPH QL NAA+NON-PROBE: NOT DETECTED
GLOBULIN SER CALC-MCNC: 3.5 G/DL (ref 2.3–3.5)
GLUCOSE SERPL-MCNC: 110 MG/DL (ref 70–99)
GLUCOSE UR STRIP-MCNC: NEGATIVE MG/DL
HADV DNA NPH QL NAA+NON-PROBE: NOT DETECTED
HCOV 229E RNA NPH QL NAA+NON-PROBE: NOT DETECTED
HCOV HKU1 RNA NPH QL NAA+NON-PROBE: NOT DETECTED
HCOV NL63 RNA NPH QL NAA+NON-PROBE: NOT DETECTED
HCOV OC43 RNA NPH QL NAA+NON-PROBE: NOT DETECTED
HCT VFR BLD AUTO: 42 % (ref 37–47)
HGB BLD-MCNC: 13.9 G/DL (ref 12–16)
HGB UR QL STRIP: NEGATIVE
HMPV RNA NPH QL NAA+NON-PROBE: NOT DETECTED
HPIV1 RNA NPH QL NAA+NON-PROBE: NOT DETECTED
HPIV2 RNA NPH QL NAA+NON-PROBE: NOT DETECTED
HPIV3 RNA NPH QL NAA+NON-PROBE: NOT DETECTED
HPIV4 RNA NPH QL NAA+NON-PROBE: NOT DETECTED
HYALINE CASTS #/AREA URNS AUTO: ABNORMAL /HPF (ref 0–5)
KETONES UR STRIP-MCNC: 15 MG/DL
LACTATE BLDV-SCNC: 1.2 MMOL/L (ref 0.5–2.2)
LACTATE BLDV-SCNC: 1.2 MMOL/L (ref 0.5–2.2)
LEUKOCYTE ESTERASE UR QL STRIP: ABNORMAL
LYMPHOCYTES # BLD: 0.5 K/UL (ref 1–4.8)
LYMPHOCYTES NFR BLD: 5.6 %
M PNEUMO DNA NPH QL NAA+NON-PROBE: NOT DETECTED
MAGNESIUM SERPL-MCNC: 1.7 MG/DL (ref 1.7–2.4)
MCH RBC QN AUTO: 29.7 PG (ref 27–31.3)
MCHC RBC AUTO-ENTMCNC: 33.1 % (ref 33–37)
MCV RBC AUTO: 89.7 FL (ref 79.4–94.8)
MONOCYTES # BLD: 1.1 K/UL (ref 0.2–0.8)
MONOCYTES NFR BLD: 12.4 %
NEUTROPHILS # BLD: 6.9 K/UL (ref 1.4–6.5)
NEUTS SEG NFR BLD: 80.9 %
NITRITE UR QL STRIP: NEGATIVE
PH UR STRIP: 5 [PH] (ref 5–9)
PLATELET # BLD AUTO: 230 K/UL (ref 130–400)
POTASSIUM SERPL-SCNC: 3.7 MEQ/L (ref 3.4–4.9)
PROCALCITONIN SERPL IA-MCNC: 0.06 NG/ML (ref 0–0.15)
PROT SERPL-MCNC: 7.1 G/DL (ref 6.3–8)
PROT UR STRIP-MCNC: 100 MG/DL
RBC # BLD AUTO: 4.68 M/UL (ref 4.2–5.4)
RBC #/AREA URNS HPF: ABNORMAL /HPF (ref 0–2)
RSV RNA NPH QL NAA+NON-PROBE: NOT DETECTED
RV+EV RNA NPH QL NAA+NON-PROBE: NOT DETECTED
SARS-COV-2 RNA NPH QL NAA+NON-PROBE: NOT DETECTED
SODIUM SERPL-SCNC: 134 MEQ/L (ref 135–144)
SP GR UR STRIP: 1.03 (ref 1–1.03)
TSH SERPL-MCNC: 1.51 UIU/ML (ref 0.44–3.86)
URINE REFLEX TO CULTURE: YES
UROBILINOGEN UR STRIP-ACNC: 2 E.U./DL
WBC # BLD AUTO: 8.5 K/UL (ref 4.8–10.8)
WBC #/AREA URNS AUTO: ABNORMAL /HPF (ref 0–5)
YEAST URNS QL MICRO: PRESENT /HPF

## 2024-07-03 PROCEDURE — 83605 ASSAY OF LACTIC ACID: CPT

## 2024-07-03 PROCEDURE — 81001 URINALYSIS AUTO W/SCOPE: CPT

## 2024-07-03 PROCEDURE — 71045 X-RAY EXAM CHEST 1 VIEW: CPT

## 2024-07-03 PROCEDURE — 96361 HYDRATE IV INFUSION ADD-ON: CPT

## 2024-07-03 PROCEDURE — 99285 EMERGENCY DEPT VISIT HI MDM: CPT

## 2024-07-03 PROCEDURE — 80053 COMPREHEN METABOLIC PANEL: CPT

## 2024-07-03 PROCEDURE — 84443 ASSAY THYROID STIM HORMONE: CPT

## 2024-07-03 PROCEDURE — 96360 HYDRATION IV INFUSION INIT: CPT

## 2024-07-03 PROCEDURE — 2580000003 HC RX 258: Performed by: PHYSICIAN ASSISTANT

## 2024-07-03 PROCEDURE — 73610 X-RAY EXAM OF ANKLE: CPT

## 2024-07-03 PROCEDURE — 83880 ASSAY OF NATRIURETIC PEPTIDE: CPT

## 2024-07-03 PROCEDURE — 93005 ELECTROCARDIOGRAM TRACING: CPT | Performed by: PHYSICIAN ASSISTANT

## 2024-07-03 PROCEDURE — 83735 ASSAY OF MAGNESIUM: CPT

## 2024-07-03 PROCEDURE — 87086 URINE CULTURE/COLONY COUNT: CPT

## 2024-07-03 PROCEDURE — 1210000000 HC MED SURG R&B

## 2024-07-03 PROCEDURE — 85025 COMPLETE CBC W/AUTO DIFF WBC: CPT

## 2024-07-03 PROCEDURE — 0202U NFCT DS 22 TRGT SARS-COV-2: CPT

## 2024-07-03 PROCEDURE — 87040 BLOOD CULTURE FOR BACTERIA: CPT

## 2024-07-03 PROCEDURE — 84145 PROCALCITONIN (PCT): CPT

## 2024-07-03 PROCEDURE — 6370000000 HC RX 637 (ALT 250 FOR IP): Performed by: PHYSICIAN ASSISTANT

## 2024-07-03 PROCEDURE — 36415 COLL VENOUS BLD VENIPUNCTURE: CPT

## 2024-07-03 RX ORDER — SODIUM CHLORIDE 0.9 % (FLUSH) 0.9 %
5-40 SYRINGE (ML) INJECTION EVERY 12 HOURS SCHEDULED
Status: DISCONTINUED | OUTPATIENT
Start: 2024-07-03 | End: 2024-07-05 | Stop reason: HOSPADM

## 2024-07-03 RX ORDER — ACETAMINOPHEN 650 MG/1
650 SUPPOSITORY RECTAL EVERY 6 HOURS PRN
Status: DISCONTINUED | OUTPATIENT
Start: 2024-07-03 | End: 2024-07-05 | Stop reason: HOSPADM

## 2024-07-03 RX ORDER — POTASSIUM CHLORIDE 7.45 MG/ML
10 INJECTION INTRAVENOUS PRN
Status: DISCONTINUED | OUTPATIENT
Start: 2024-07-03 | End: 2024-07-05 | Stop reason: HOSPADM

## 2024-07-03 RX ORDER — MAGNESIUM SULFATE IN WATER 40 MG/ML
2000 INJECTION, SOLUTION INTRAVENOUS PRN
Status: DISCONTINUED | OUTPATIENT
Start: 2024-07-03 | End: 2024-07-05 | Stop reason: HOSPADM

## 2024-07-03 RX ORDER — ACETAMINOPHEN 325 MG/1
650 TABLET ORAL EVERY 6 HOURS PRN
Status: DISCONTINUED | OUTPATIENT
Start: 2024-07-03 | End: 2024-07-05 | Stop reason: HOSPADM

## 2024-07-03 RX ORDER — SODIUM CHLORIDE, SODIUM LACTATE, POTASSIUM CHLORIDE, CALCIUM CHLORIDE 600; 310; 30; 20 MG/100ML; MG/100ML; MG/100ML; MG/100ML
INJECTION, SOLUTION INTRAVENOUS CONTINUOUS
Status: DISCONTINUED | OUTPATIENT
Start: 2024-07-03 | End: 2024-07-05 | Stop reason: HOSPADM

## 2024-07-03 RX ORDER — ONDANSETRON 2 MG/ML
4 INJECTION INTRAMUSCULAR; INTRAVENOUS EVERY 6 HOURS PRN
Status: DISCONTINUED | OUTPATIENT
Start: 2024-07-03 | End: 2024-07-05 | Stop reason: HOSPADM

## 2024-07-03 RX ORDER — 0.9 % SODIUM CHLORIDE 0.9 %
500 INTRAVENOUS SOLUTION INTRAVENOUS ONCE
Status: COMPLETED | OUTPATIENT
Start: 2024-07-03 | End: 2024-07-03

## 2024-07-03 RX ORDER — ONDANSETRON 4 MG/1
4 TABLET, ORALLY DISINTEGRATING ORAL EVERY 8 HOURS PRN
Status: DISCONTINUED | OUTPATIENT
Start: 2024-07-03 | End: 2024-07-05 | Stop reason: HOSPADM

## 2024-07-03 RX ORDER — SODIUM CHLORIDE 9 MG/ML
INJECTION, SOLUTION INTRAVENOUS PRN
Status: DISCONTINUED | OUTPATIENT
Start: 2024-07-03 | End: 2024-07-05 | Stop reason: HOSPADM

## 2024-07-03 RX ORDER — POLYETHYLENE GLYCOL 3350 17 G/17G
17 POWDER, FOR SOLUTION ORAL DAILY PRN
Status: DISCONTINUED | OUTPATIENT
Start: 2024-07-03 | End: 2024-07-05 | Stop reason: HOSPADM

## 2024-07-03 RX ORDER — LANOLIN ALCOHOL/MO/W.PET/CERES
3 CREAM (GRAM) TOPICAL NIGHTLY
Status: DISCONTINUED | OUTPATIENT
Start: 2024-07-03 | End: 2024-07-05 | Stop reason: HOSPADM

## 2024-07-03 RX ORDER — ACETAMINOPHEN 325 MG/1
650 TABLET ORAL ONCE
Status: COMPLETED | OUTPATIENT
Start: 2024-07-03 | End: 2024-07-03

## 2024-07-03 RX ORDER — POTASSIUM CHLORIDE 20 MEQ/1
40 TABLET, EXTENDED RELEASE ORAL PRN
Status: DISCONTINUED | OUTPATIENT
Start: 2024-07-03 | End: 2024-07-05 | Stop reason: HOSPADM

## 2024-07-03 RX ORDER — SODIUM CHLORIDE 0.9 % (FLUSH) 0.9 %
5-40 SYRINGE (ML) INJECTION PRN
Status: DISCONTINUED | OUTPATIENT
Start: 2024-07-03 | End: 2024-07-05 | Stop reason: HOSPADM

## 2024-07-03 RX ORDER — ENOXAPARIN SODIUM 100 MG/ML
40 INJECTION SUBCUTANEOUS DAILY
Status: DISCONTINUED | OUTPATIENT
Start: 2024-07-04 | End: 2024-07-05 | Stop reason: HOSPADM

## 2024-07-03 RX ADMIN — SODIUM CHLORIDE 500 ML: 9 INJECTION, SOLUTION INTRAVENOUS at 17:05

## 2024-07-03 RX ADMIN — ACETAMINOPHEN 650 MG: 325 TABLET ORAL at 17:05

## 2024-07-03 ASSESSMENT — PAIN DESCRIPTION - ORIENTATION: ORIENTATION: RIGHT

## 2024-07-03 ASSESSMENT — ENCOUNTER SYMPTOMS
NAUSEA: 0
EYE DISCHARGE: 0
VOICE CHANGE: 0
ABDOMINAL DISTENTION: 0
VOMITING: 0
ABDOMINAL PAIN: 0
APNEA: 0

## 2024-07-03 ASSESSMENT — PAIN DESCRIPTION - LOCATION
LOCATION: ANKLE
LOCATION: ANKLE

## 2024-07-03 ASSESSMENT — PAIN SCALES - GENERAL: PAINLEVEL_OUTOF10: 2

## 2024-07-03 NOTE — PROGRESS NOTES
mouth daily Indications: in am.      Cholecalciferol (VITAMIN D3) 2000 units CAPS Take 1 capsule by mouth Daily with supper      Multiple Vitamins-Minerals (MULTIVITAMIN PO) Take 1 tablet by mouth daily       OXYGEN Inhale 3 L into the lungs continuous Uses prn at bedtime         Review of Systems   Constitutional:  Positive for activity change, appetite change, chills, diaphoresis, fatigue, fever and unexpected weight change.   Cardiovascular:  Positive for leg swelling.   Musculoskeletal:  Positive for arthralgias.       Objective:   /68 (Site: Left Lower Arm, Position: Supine)   Pulse (!) 120   Temp (!) 102.3 °F (39.1 °C) (Temporal)   Resp 22   SpO2 95%    Wt Readings from Last 3 Encounters:   01/08/24 85.7 kg (189 lb)   01/08/24 85.7 kg (189 lb)   04/04/23 89.8 kg (198 lb)       Physical Exam  Constitutional:       General: She is awake. She is not in acute distress.     Appearance: Normal appearance. She is overweight. She is ill-appearing, toxic-appearing and diaphoretic.   HENT:      Head: Normocephalic and atraumatic.      Jaw: There is normal jaw occlusion.      Mouth/Throat:      Lips: Pink. No lesions.      Mouth: No angioedema.   Cardiovascular:      Rate and Rhythm: Tachycardia present. Rhythm irregular.      Heart sounds: Normal heart sounds.   Pulmonary:      Effort: Pulmonary effort is normal. No respiratory distress.      Breath sounds: Decreased breath sounds present. No wheezing or rales.   Abdominal:      General: Abdomen is protuberant. Bowel sounds are normal. There is no distension.      Palpations: Abdomen is soft.      Tenderness: There is no abdominal tenderness. There is no guarding or rebound.   Musculoskeletal:      Right lower leg: 3+ Pitting Edema present.      Left lower leg: 3+ Pitting Edema present.   Skin:     General: Skin is warm and moist.      Coloration: Skin is pale.      Findings: Erythema (BLE) present.   Neurological:      General: No focal deficit present.

## 2024-07-03 NOTE — ED TRIAGE NOTES
Pt is from home and lives with son (brought by [facility] for general weakness for a week)  Pt has been sitting in her urine all day  Pt states her right ankle hurts (swollen) 7/10 pain   Pt is afebrile   Pt is A&Ox4  Pt states she doesn't use a cane or walker

## 2024-07-04 LAB
ALBUMIN SERPL-MCNC: 3.3 G/DL (ref 3.5–4.6)
ALP SERPL-CCNC: 70 U/L (ref 40–130)
ALT SERPL-CCNC: 11 U/L (ref 0–33)
ANION GAP SERPL CALCULATED.3IONS-SCNC: 10 MEQ/L (ref 9–15)
AST SERPL-CCNC: 17 U/L (ref 0–35)
BACTERIA BLD CULT ORG #2: NORMAL
BACTERIA BLD CULT: NORMAL
BASOPHILS # BLD: 0 K/UL (ref 0–0.2)
BASOPHILS NFR BLD: 0.4 %
BILIRUB SERPL-MCNC: 0.7 MG/DL (ref 0.2–0.7)
BILIRUB UR QL STRIP: NEGATIVE
BUN SERPL-MCNC: 14 MG/DL (ref 8–23)
CALCIUM SERPL-MCNC: 9 MG/DL (ref 8.5–9.9)
CHLORIDE SERPL-SCNC: 103 MEQ/L (ref 95–107)
CLARITY UR: CLEAR
CO2 SERPL-SCNC: 28 MEQ/L (ref 20–31)
COLOR UR: YELLOW
CREAT SERPL-MCNC: 0.49 MG/DL (ref 0.5–0.9)
EOSINOPHIL # BLD: 0.1 K/UL (ref 0–0.7)
EOSINOPHIL NFR BLD: 2.4 %
ERYTHROCYTE [DISTWIDTH] IN BLOOD BY AUTOMATED COUNT: 13.2 % (ref 11.5–14.5)
GLOBULIN SER CALC-MCNC: 2.9 G/DL (ref 2.3–3.5)
GLUCOSE SERPL-MCNC: 91 MG/DL (ref 70–99)
GLUCOSE UR STRIP-MCNC: NEGATIVE MG/DL
HCT VFR BLD AUTO: 38.1 % (ref 37–47)
HGB BLD-MCNC: 12.7 G/DL (ref 12–16)
HGB UR QL STRIP: NEGATIVE
KETONES UR STRIP-MCNC: NEGATIVE MG/DL
LEUKOCYTE ESTERASE UR QL STRIP: NEGATIVE
LYMPHOCYTES # BLD: 0.9 K/UL (ref 1–4.8)
LYMPHOCYTES NFR BLD: 18.6 %
MCH RBC QN AUTO: 30.2 PG (ref 27–31.3)
MCHC RBC AUTO-ENTMCNC: 33.3 % (ref 33–37)
MCV RBC AUTO: 90.5 FL (ref 79.4–94.8)
MONOCYTES # BLD: 0.7 K/UL (ref 0.2–0.8)
MONOCYTES NFR BLD: 14.4 %
NEUTROPHILS # BLD: 3.2 K/UL (ref 1.4–6.5)
NEUTS SEG NFR BLD: 63.8 %
NITRITE UR QL STRIP: NEGATIVE
PH UR STRIP: 5.5 [PH] (ref 5–9)
PLATELET # BLD AUTO: 188 K/UL (ref 130–400)
POTASSIUM SERPL-SCNC: 3.6 MEQ/L (ref 3.4–4.9)
PROT SERPL-MCNC: 6.2 G/DL (ref 6.3–8)
PROT UR STRIP-MCNC: NEGATIVE MG/DL
RBC # BLD AUTO: 4.21 M/UL (ref 4.2–5.4)
SODIUM SERPL-SCNC: 141 MEQ/L (ref 135–144)
SP GR UR STRIP: 1.01 (ref 1–1.03)
URATE SERPL-MCNC: 6 MG/DL (ref 2.4–5.7)
URINE REFLEX TO CULTURE: NORMAL
UROBILINOGEN UR STRIP-ACNC: 1 E.U./DL
WBC # BLD AUTO: 5 K/UL (ref 4.8–10.8)

## 2024-07-04 PROCEDURE — 2580000003 HC RX 258

## 2024-07-04 PROCEDURE — 36415 COLL VENOUS BLD VENIPUNCTURE: CPT

## 2024-07-04 PROCEDURE — 99221 1ST HOSP IP/OBS SF/LOW 40: CPT | Performed by: NURSE PRACTITIONER

## 2024-07-04 PROCEDURE — 6370000000 HC RX 637 (ALT 250 FOR IP)

## 2024-07-04 PROCEDURE — 97166 OT EVAL MOD COMPLEX 45 MIN: CPT

## 2024-07-04 PROCEDURE — 85025 COMPLETE CBC W/AUTO DIFF WBC: CPT

## 2024-07-04 PROCEDURE — 2500000003 HC RX 250 WO HCPCS

## 2024-07-04 PROCEDURE — 6360000002 HC RX W HCPCS

## 2024-07-04 PROCEDURE — 1210000000 HC MED SURG R&B

## 2024-07-04 PROCEDURE — 84550 ASSAY OF BLOOD/URIC ACID: CPT

## 2024-07-04 PROCEDURE — 80053 COMPREHEN METABOLIC PANEL: CPT

## 2024-07-04 PROCEDURE — 2700000000 HC OXYGEN THERAPY PER DAY

## 2024-07-04 PROCEDURE — 2580000003 HC RX 258: Performed by: PHYSICIAN ASSISTANT

## 2024-07-04 PROCEDURE — 6370000000 HC RX 637 (ALT 250 FOR IP): Performed by: INTERNAL MEDICINE

## 2024-07-04 PROCEDURE — 97162 PT EVAL MOD COMPLEX 30 MIN: CPT

## 2024-07-04 PROCEDURE — 81003 URINALYSIS AUTO W/O SCOPE: CPT

## 2024-07-04 PROCEDURE — 6360000002 HC RX W HCPCS: Performed by: PHYSICIAN ASSISTANT

## 2024-07-04 RX ORDER — VALSARTAN 160 MG/1
320 TABLET ORAL NIGHTLY
Status: DISCONTINUED | OUTPATIENT
Start: 2024-07-04 | End: 2024-07-05 | Stop reason: HOSPADM

## 2024-07-04 RX ORDER — COLCHICINE 0.6 MG/1
0.6 TABLET ORAL DAILY
Status: DISCONTINUED | OUTPATIENT
Start: 2024-07-04 | End: 2024-07-05 | Stop reason: HOSPADM

## 2024-07-04 RX ORDER — ROSUVASTATIN CALCIUM 10 MG/1
10 TABLET, COATED ORAL DAILY
Status: DISCONTINUED | OUTPATIENT
Start: 2024-07-04 | End: 2024-07-05 | Stop reason: HOSPADM

## 2024-07-04 RX ORDER — PANTOPRAZOLE SODIUM 40 MG/1
40 TABLET, DELAYED RELEASE ORAL
Status: DISCONTINUED | OUTPATIENT
Start: 2024-07-04 | End: 2024-07-05 | Stop reason: HOSPADM

## 2024-07-04 RX ORDER — KETOROLAC TROMETHAMINE 15 MG/ML
15 INJECTION, SOLUTION INTRAMUSCULAR; INTRAVENOUS EVERY 6 HOURS PRN
Status: DISCONTINUED | OUTPATIENT
Start: 2024-07-04 | End: 2024-07-05 | Stop reason: HOSPADM

## 2024-07-04 RX ORDER — LEVOTHYROXINE SODIUM 0.05 MG/1
50 TABLET ORAL
Status: DISCONTINUED | OUTPATIENT
Start: 2024-07-04 | End: 2024-07-05 | Stop reason: HOSPADM

## 2024-07-04 RX ORDER — FUROSEMIDE 40 MG/1
40 TABLET ORAL DAILY
Status: DISCONTINUED | OUTPATIENT
Start: 2024-07-04 | End: 2024-07-05 | Stop reason: HOSPADM

## 2024-07-04 RX ORDER — ASPIRIN 81 MG/1
81 TABLET ORAL DAILY
Status: DISCONTINUED | OUTPATIENT
Start: 2024-07-04 | End: 2024-07-05 | Stop reason: HOSPADM

## 2024-07-04 RX ORDER — GABAPENTIN 100 MG/1
100 CAPSULE ORAL 2 TIMES DAILY
Status: DISCONTINUED | OUTPATIENT
Start: 2024-07-04 | End: 2024-07-05 | Stop reason: HOSPADM

## 2024-07-04 RX ADMIN — MICONAZOLE NITRATE: 2 POWDER TOPICAL at 22:10

## 2024-07-04 RX ADMIN — GABAPENTIN 100 MG: 100 CAPSULE ORAL at 22:05

## 2024-07-04 RX ADMIN — ROSUVASTATIN CALCIUM 10 MG: 10 TABLET, FILM COATED ORAL at 09:25

## 2024-07-04 RX ADMIN — SODIUM CHLORIDE, POTASSIUM CHLORIDE, SODIUM LACTATE AND CALCIUM CHLORIDE: 600; 310; 30; 20 INJECTION, SOLUTION INTRAVENOUS at 14:23

## 2024-07-04 RX ADMIN — SODIUM CHLORIDE, POTASSIUM CHLORIDE, SODIUM LACTATE AND CALCIUM CHLORIDE: 600; 310; 30; 20 INJECTION, SOLUTION INTRAVENOUS at 01:15

## 2024-07-04 RX ADMIN — MICONAZOLE NITRATE: 2 POWDER TOPICAL at 05:43

## 2024-07-04 RX ADMIN — LEVOTHYROXINE SODIUM 50 MCG: 0.05 TABLET ORAL at 05:48

## 2024-07-04 RX ADMIN — ENOXAPARIN SODIUM 40 MG: 100 INJECTION SUBCUTANEOUS at 09:24

## 2024-07-04 RX ADMIN — Medication 3 MG: at 01:25

## 2024-07-04 RX ADMIN — COLCHICINE 0.6 MG: 0.6 TABLET, FILM COATED ORAL at 09:24

## 2024-07-04 RX ADMIN — SODIUM CHLORIDE, POTASSIUM CHLORIDE, SODIUM LACTATE AND CALCIUM CHLORIDE: 600; 310; 30; 20 INJECTION, SOLUTION INTRAVENOUS at 23:55

## 2024-07-04 RX ADMIN — ACETAMINOPHEN 325MG 650 MG: 325 TABLET ORAL at 05:46

## 2024-07-04 RX ADMIN — PANTOPRAZOLE SODIUM 40 MG: 40 TABLET, DELAYED RELEASE ORAL at 05:48

## 2024-07-04 RX ADMIN — KETOROLAC TROMETHAMINE 15 MG: 15 INJECTION, SOLUTION INTRAMUSCULAR; INTRAVENOUS at 01:13

## 2024-07-04 RX ADMIN — FUROSEMIDE 40 MG: 40 TABLET ORAL at 09:24

## 2024-07-04 RX ADMIN — Medication 10 ML: at 09:25

## 2024-07-04 RX ADMIN — SERTRALINE 50 MG: 50 TABLET, FILM COATED ORAL at 22:05

## 2024-07-04 RX ADMIN — ASPIRIN 81 MG: 81 TABLET, COATED ORAL at 09:23

## 2024-07-04 RX ADMIN — GABAPENTIN 100 MG: 100 CAPSULE ORAL at 09:25

## 2024-07-04 RX ADMIN — Medication 10 ML: at 01:16

## 2024-07-04 RX ADMIN — VALSARTAN 320 MG: 160 TABLET ORAL at 22:05

## 2024-07-04 RX ADMIN — Medication 3 MG: at 22:05

## 2024-07-04 RX ADMIN — CEFTRIAXONE SODIUM 1000 MG: 1 INJECTION, POWDER, FOR SOLUTION INTRAMUSCULAR; INTRAVENOUS at 01:35

## 2024-07-04 ASSESSMENT — PAIN DESCRIPTION - LOCATION
LOCATION: LEG
LOCATION: ANKLE

## 2024-07-04 ASSESSMENT — ENCOUNTER SYMPTOMS
SORE THROAT: 0
NAUSEA: 0
SHORTNESS OF BREATH: 0
ABDOMINAL PAIN: 0
BACK PAIN: 0
EYE PAIN: 0
COUGH: 0
WHEEZING: 0
EYE REDNESS: 0
DIARRHEA: 0
VOMITING: 0
PHOTOPHOBIA: 0

## 2024-07-04 ASSESSMENT — PAIN DESCRIPTION - DESCRIPTORS
DESCRIPTORS: ACHING;SORE
DESCRIPTORS: ACHING;THROBBING

## 2024-07-04 ASSESSMENT — PAIN SCALES - GENERAL
PAINLEVEL_OUTOF10: 8
PAINLEVEL_OUTOF10: 3
PAINLEVEL_OUTOF10: 5

## 2024-07-04 ASSESSMENT — PAIN DESCRIPTION - ORIENTATION
ORIENTATION: RIGHT
ORIENTATION: RIGHT;LEFT;MID

## 2024-07-04 NOTE — FLOWSHEET NOTE
Patient arrived to room 495 from E.D. via cart.    21:50 she is alert and oriented, pupils equal and reactive,dentures intact,lungs clear,apical pulse regular ,no jugular vein distention,abdomen soft with active bowel sounds,surgical scars on both knees,redenned buttocks crack,groin excoriated,right ankle swollen,right shin bruised ,thick and long toe nails.  00:10 patient was straight catheterized for urinalysis and culture,she tolerated the procedure well,strict sterile technique was observed ,obtained the urine and sent to the laboratory.    1:13 Toradol given for right ankle pain described  as aching/sore sensation,feet are up on a pillow ,she voiced comfort.    1:25 she is sleeping,her breathing is unlabored.    05:45 tylenol was given for right an left discomfort,she was helped repositioned to her left side with pillows supporting her back.    06:40 she is resting quietly in bed with her eyes closed.Bed alarm in place.

## 2024-07-04 NOTE — H&P
Hospitalist Group   History and Physical      CHIEF COMPLAINT: Right ankle pain    History of Present Illness:  Dennise Sosa is a 89 y.o. female with a PMHx of hypertension, A-fib, weakness, DHARA, and falls, presents with chief complaint of bilateral ankle and foot pain right worse than left.  She reports that she has been unable to care for herself at home due to extreme pain in ankles.  She reports she is unable to bear weight.  States the pain is so bad that she has not been able to eat or drink for several days.  She reports she has arthritis but this pain is much worse than her baseline.  She reports she was unable to to get out of her chair yesterday and needed a lift assist.  Reports she has also been urinating more than usual and her urine is foul-smelling.  She reports she has been unable to to get to the bathroom and has been incontinent.  She reports that she has been unable to take her medication for a few days due to inability to get it.  She reports when she did get it she dropped it was unable to pick it up.     REVIEW OF SYSTEMS:  no fevers, chills, cp, sob, n/v, ha, vision/hearing changes, wt changes, hot/cold flashes, other open skin lesions, diarrhea, constipation, dysuria/hematuria unless noted in HPI. Complete ROS performed with the patient and is otherwise negative.      PMH:  Past Medical History:   Diagnosis Date    BPV (benign positional vertigo) 2019    CAD (coronary artery disease)     Dr York    COPD (chronic obstructive pulmonary disease) ([facility]) 2012, VQ scan and CXR    has supplemental oxygen, uses it for 4-6 hours nightly, no inhalers use    Diverticulosis     Hiatal hernia     Hiatal hernia with gastroesophageal reflux 2015    EGD Dr Singh, large HH    History of blood transfusion     History of colonoscopy with polypectomy 2017    Dr Singh    History of sleep apnea 2011    no CPAP use    Hypertension     Hypothyroidism     Mixed hyperlipidemia 3/27/2021    Nocturnal  4/26/24   Tova Mazariegos PA-C   oxyBUTYnin (DITROPAN) 5 MG tablet Take 0.5 tablets by mouth 2 times daily  Patient not taking: Reported on 4/30/2024 4/3/24 7/2/24  Sam Alva APRN - CNP   diclofenac (VOLTAREN) 50 MG EC tablet Take 1 tablet by mouth 2 times daily    Julio Cesar Lyons MD   polyethylene glycol (GLYCOLAX) 17 GM/SCOOP powder Take 17 g by mouth daily as needed (Constipation)    ProviderJulio Cesar MD   docusate sodium (COLACE) 100 MG capsule Take 1 capsule by mouth daily as needed for Constipation    Julio Cesar Lyons MD   aspirin 81 MG EC tablet Take 1 tablet by mouth daily    Julio Cesar Lyons MD   acetaminophen (TYLENOL 8 HOUR ARTHRITIS PAIN) 650 MG extended release tablet Take 2 tablets by mouth every 8 hours as needed for Pain    ProviderJulio Cesar MD   diclofenac sodium (VOLTAREN) 1 % GEL Apply 2 g topically 2 times daily as needed for Pain    ProviderJulio Cesar MD   clotrimazole-betamethasone (LOTRISONE) 1-0.05 % cream Apply topically 2 times daily. 1/8/24   Tova Mazariegos PA-C   nystatin (MYCOSTATIN) 878727 UNIT/GM powder Apply 3 times daily. 1/8/24   Tova Mazariegos PA-C   furosemide (LASIX) 40 MG tablet Take 1 tablet by mouth daily 4/17/23   HolidayKenton DO   Lift Chair MISC by Does not apply route 1/21/22   Tova Mazariegos PA-C   nitroGLYCERIN (NITROSTAT) 0.4 MG SL tablet Place 1 tablet under the tongue every 5 minutes as needed for Chest pain 6/22/20   Tova Mazariegos PA-C   Misc. Devices MISC Portable Oxygen concentrator 5/24/19   Juan C Rob MD   vitamin B-12 (CYANOCOBALAMIN) 1000 MCG tablet Take 1 tablet by mouth daily Indications: in am.    Julio Cesar Lyons MD   Cholecalciferol (VITAMIN D3) 2000 units CAPS Take 1 capsule by mouth Daily with supper    ProviderJulio Cesar MD   Multiple Vitamins-Minerals (MULTIVITAMIN PO) Take 1 tablet by mouth daily     ProviderJulio Cesar MD   OXYGEN Inhale 3 L into the lungs continuous Uses prn at bedtime    Provider

## 2024-07-04 NOTE — PROGRESS NOTES
Recommendations: Continue to assess pending progress  Decision Making: Medium Complexity     History: complex chart  Exam: 10 perf deficits  Assistance / Modification: Lorie    [facility] ([facility]) Self care Score   How much help is needed for putting on and taking off regular lower body clothing?: Total  How much help is needed for bathing (which includes washing, rinsing, drying)?: Total  How much help is needed for toileting (which includes using toilet, bedpan, or urinal)?: Total  How much help is needed for putting on and taking off regular upper body clothing?: A Little  How much help is needed for taking care of personal grooming?: A Little  How much help for eating meals?: None  AM-[facility] Inpatient Daily Activity Raw Score: 13  AM-[facility] Inpatient ADL T-Scale Score : 32.03  ADL Inpatient CMS 0-100% Score: 63.03    Therapy key for assistance levels -   Independent/Mod I = Pt. is able to perform task with no assistance but may require a device   Stand by assistance = Pt. does not perform task at an independent level but does not need physical assistance, requires verbal cues  Minimal, Moderate, Maximal Assistance = Pt. requires physical assistance (25%, 50%, 75% assist from helper) for task but is able to actively participate in task   Dependent = Pt. requires total assistance with task and is not able to actively participate with task completion     Plan:  Occupational Therapy Plan  Times Per Week: 1-4x/week  Therapy Duration:  (acute LOS)  Current Treatment Recommendations: Strengthening, Balance training, Functional mobility training, Endurance training, Safety education & training, Neuromuscular re-education, Patient/Caregiver education & training, Equipment evaluation, education, & procurement, Self-Care / ADL, Home management training    Goals:   Patient will:    - Improve functional endurance to tolerate/complete 30 mins of ADL's  - Be MI in UB ADLs   - Be Phuong in LB ADLs  - Be Phuong in ADL transfers without  LOB  - Improve B UE strength and endurance to WFL in order to participate in self-care activities as projected.  - Access appropriate D/C site with as few architectural barriers as possible.    Patient Goal: Patient goals : go home     Discussed and agreed upon: Yes Comments:     Therapy Time:   Individual   Time In 1429   Time Out 1448   Minutes 19        Eval: 18 minutes     Electronically signed by:    Michael Figueroa OT,   7/4/2024, 3:11 PM

## 2024-07-04 NOTE — CONSULTS
Consult Note  Patient: Dennise Sosa  Unit/Bed: W495/W495-01  YOB: 1933  MRN: [id number]  Acct: [id number]   Admitting Diagnosis: Suspected urinary tract infection [R39.89]  Date:  7/3/2024  Hospital Day: 1    Complaint:  Bilateral foot/ ankle pain   Difficulty ambulating   Right more then left        History of Present Illness:  Dennise Sosa is a 89 y.o. female with a PMHx of hypertension, A-fib, weakness, DHARA, and falls, presents with chief complaint of bilateral ankle and foot pain right worse than left.  She reports that she has been unable to care for herself at home due to extreme pain in ankles.  She reports she is unable to bear weight.  States the pain is so bad that she has not been able to eat or drink for several days.  She reports she has arthritis but this pain is much worse than her baseline.  She reports she was unable to to get out of her chair yesterday and needed a lift assist.  Reports she has also been urinating more than usual and her urine is foul-smelling.  She reports she has been unable to to get to the bathroom and has been incontinent.  She reports that she has been unable to take her medication for a few days due to inability to get it.  She reports when she did get it she dropped it was unable to pick it up.    Pt denies recent fall - but states she has vertigo and did fall about 3 weeks ago- she has been since able to walk but last few days the pain and stiffness got worse   She states with elevation  and since admission   She is better   In bed she only has some pain with movement and touching of the right ankle        PMHx:  Past Medical History:   Diagnosis Date    BPV (benign positional vertigo) 2019    CAD (coronary artery disease)     Dr York    COPD (chronic obstructive pulmonary disease) ([facility]) 2012, VQ scan and CXR    has supplemental oxygen, uses it for 4-6 hours nightly, no inhalers use    Diverticulosis     Hiatal hernia     Hiatal hernia with

## 2024-07-04 NOTE — PROGRESS NOTES
Hospitalist Progress Note      Date of Admission: 7/3/2024  Chief Complaint:    Chief Complaint   Patient presents with    Fatigue     For a week  Pt denies n/v/d     Subjective:  No new complaints.  No nausea, vomiting, chest pain, or headache      Medications:    Infusion Medications    sodium chloride      lactated ringers IV soln 100 mL/hr at 07/04/24 1423     Scheduled Medications    miconazole   Topical BID    aspirin  81 mg Oral Daily    furosemide  40 mg Oral Daily    gabapentin  100 mg Oral BID    levothyroxine  50 mcg Oral QAM AC    pantoprazole  40 mg Oral QAM AC    rosuvastatin  10 mg Oral Daily    sertraline  50 mg Oral Nightly    valsartan  320 mg Oral Nightly    colchicine  0.6 mg Oral Daily    sodium chloride flush  5-40 mL IntraVENous 2 times per day    enoxaparin  40 mg SubCUTAneous Daily    melatonin  3 mg Oral Nightly     PRN Meds: ketorolac, sodium chloride flush, sodium chloride, potassium chloride **OR** potassium alternative oral replacement **OR** potassium chloride, magnesium sulfate, ondansetron **OR** ondansetron, polyethylene glycol, acetaminophen **OR** acetaminophen    Intake/Output Summary (Last 24 hours) at 7/4/2024 1613  Last data filed at 7/4/2024 1541  Gross per 24 hour   Intake --   Output 500 ml   Net -500 ml     Exam:  /77   Pulse 61   Temp 98.4 °F (36.9 °C) (Oral)   Resp 18   Ht 1.651 m (5' 5\")   Wt 81.9 kg (180 lb 8 oz)   SpO2 98%   BMI 30.04 kg/m²   Head: Normocephalic, atraumatic  Sclera clear  Neck JVD flat  Lungs: Normal effort of breathing    Labs:   Recent Labs     07/03/24  1623 07/04/24  0548   WBC 8.5 5.0   HGB 13.9 12.7   HCT 42.0 38.1    188     Recent Labs     07/03/24  1623 07/04/24  0548   * 141   K 3.7 3.6   CL 95 103   CO2 24 28   BUN 16 14   CREATININE 0.58 0.49*   CALCIUM 9.2 9.0   AST 15 17   ALT 9 11   BILITOT 1.0* 0.7   ALKPHOS 81 70     No results for input(s): \"INR\" in the last 72 hours.  No results for input(s): \"CKTOTAL\",

## 2024-07-04 NOTE — ED PROVIDER NOTES
[facility]  4W MED SURG UNIT  EMERGENCY DEPARTMENT ENCOUNTER      Pt Name: Dennise Sosa  MRN: [id number]  Birthdate 6/19/1933  Date of evaluation: 7/3/2024  Provider: Mil Rosas PA-C  9:53 PM EDT    CHIEF COMPLAINT       Chief Complaint   Patient presents with    Fatigue     For a week  Pt denies n/v/d         HISTORY OF PRESENT ILLNESS   (Location/Symptom, Timing/Onset, Context/Setting, Quality, Duration, Modifying Factors, Severity)  Note limiting factors.   Dennise Sosa is a 89 y.o. female who presents to the emergency department patient notes that she has had fatigue and weakness for a week.  She notes that her legs are weak she is got pain in her legs.  She has history of multiple surgeries.  Denies fever chills nausea vomiting diarrhea.  Symptoms moderate severity nothing improves or worsen symptoms.    HPI    Nursing Notes were reviewed.    REVIEW OF SYSTEMS    (2-9 systems for level 4, 10 or more for level 5)     Review of Systems   Constitutional:  Positive for activity change and fatigue.   HENT:  Negative for ear discharge, nosebleeds and voice change.    Eyes:  Negative for discharge.   Respiratory:  Negative for apnea.    Cardiovascular:  Negative for chest pain.   Gastrointestinal:  Negative for abdominal distention, abdominal pain, nausea and vomiting.   Musculoskeletal:  Positive for myalgias. Negative for joint swelling.   Skin:  Negative for pallor.   Neurological:  Positive for weakness. Negative for seizures and facial asymmetry.   Psychiatric/Behavioral:  Negative for behavioral problems, self-injury and sleep disturbance.    All other systems reviewed and are negative.      Except as noted above the remainder of the review of systems was reviewed and negative.       PAST MEDICAL HISTORY     Past Medical History:   Diagnosis Date    BPV (benign positional vertigo) 2019    CAD (coronary artery disease)     Dr York    COPD (chronic obstructive pulmonary disease) ([facility]) 2012, VQ scan

## 2024-07-04 NOTE — PROGRESS NOTES
Stairs - Number of Steps: 2-3  Home Equipment: Rollator  ADL Assistance: Independent  Homemaking Assistance:  (son completes)  Ambulation Assistance: Independent (with rollator)  Transfer Assistance: Independent  Active : No  Additional Comments: left handed    OBJECTIVE:   Vision  Vision: Impaired  Vision Exceptions: Wears glasses at all times  Hearing: Exceptions to WFL  Hearing Exceptions: Hard of hearing/hearing concerns    Cognition:  Orientation Level: Oriented to situation, Oriented to person, Oriented to place  Follows Commands: Within Functional Limits         ROM:  AROM: Generally decreased, functional  PROM: Generally decreased, functional    Strength:  Strength: Generally decreased, functional    Neuro:  Balance  Sitting - Static: Poor;Fair (pt with initial post lean in sitting requiring assistance for correction - once in midline pt able to maintain position with SBA)  Sitting - Dynamic: Poor (post lean throughout transitiona dn with reaching attempts)  Standing - Static: Poor  Standing - Dynamic: Poor  Comments: in standing pt demonstrated post lean and LOB as well and downward LOB                      Coordination: Generally decreased, functional         Bed mobility  Rolling to Left: Maximum assistance  Rolling to Right: Maximum assistance  Supine to Sit: Maximum assistance  Sit to Supine: Maximum assistance  Bed Mobility Comments: poor motor panning. Pt unable to generate force adequate to require less assistance    Transfers  Sit to Stand: Maximum Assistance  Comment: Pt required 3 attempts to attain standing - bed elevated with hands on walker plus therapist support at right knee required for success    Ambulation  Device: Rolling Walker  Other Apparatus:  (right boot)  Assistance: Maximum assistance  Quality of Gait: poor right LE weight acceptance, poor UE use to off load RLE, left knee buckling evident and required assistance for balance correction  Distance: 2  steps  completion. Device may be needed.  Stand by assistance = pt requires verbal cues or instructions from another person, close to but not touching, to perform the activity  Minimal assistance= pt performs 75% or more of the activity; assistance is required to complete the activity  Moderate assistance= pt performs 50% of the activity; assistance is required to complete the activity  Maximal assistance = pt performs 25% of the activity; assistance is required to complete the activity  Dependent = pt requires total physical assistance to accomplish the task

## 2024-07-05 VITALS
TEMPERATURE: 98.1 F | WEIGHT: 180.5 LBS | RESPIRATION RATE: 20 BRPM | HEART RATE: 77 BPM | DIASTOLIC BLOOD PRESSURE: 74 MMHG | SYSTOLIC BLOOD PRESSURE: 154 MMHG | OXYGEN SATURATION: 98 % | BODY MASS INDEX: 30.07 KG/M2 | HEIGHT: 65 IN

## 2024-07-05 PROBLEM — S93.401A SPRAIN OF RIGHT ANKLE: Status: ACTIVE | Noted: 2024-07-05

## 2024-07-05 PROBLEM — R26.2 INABILITY TO AMBULATE DUE TO ANKLE OR FOOT: Status: ACTIVE | Noted: 2024-07-05

## 2024-07-05 LAB
ALBUMIN SERPL-MCNC: 3.3 G/DL (ref 3.5–4.6)
ALP SERPL-CCNC: 78 U/L (ref 40–130)
ALT SERPL-CCNC: 20 U/L (ref 0–33)
ANION GAP SERPL CALCULATED.3IONS-SCNC: 8 MEQ/L (ref 9–15)
AST SERPL-CCNC: 25 U/L (ref 0–35)
BACTERIA UR CULT: NORMAL
BASOPHILS # BLD: 0 K/UL (ref 0–0.2)
BASOPHILS NFR BLD: 0.3 %
BILIRUB SERPL-MCNC: 0.4 MG/DL (ref 0.2–0.7)
BUN SERPL-MCNC: 13 MG/DL (ref 8–23)
CALCIUM SERPL-MCNC: 8.6 MG/DL (ref 8.5–9.9)
CHLORIDE SERPL-SCNC: 100 MEQ/L (ref 95–107)
CO2 SERPL-SCNC: 27 MEQ/L (ref 20–31)
CREAT SERPL-MCNC: 0.51 MG/DL (ref 0.5–0.9)
EKG ATRIAL RATE: 90 BPM
EKG Q-T INTERVAL: 382 MS
EKG QRS DURATION: 152 MS
EKG QTC CALCULATION (BAZETT): 477 MS
EKG R AXIS: -84 DEGREES
EKG T AXIS: 14 DEGREES
EKG VENTRICULAR RATE: 94 BPM
EOSINOPHIL # BLD: 0.1 K/UL (ref 0–0.7)
EOSINOPHIL NFR BLD: 2 %
ERYTHROCYTE [DISTWIDTH] IN BLOOD BY AUTOMATED COUNT: 13 % (ref 11.5–14.5)
GLOBULIN SER CALC-MCNC: 2.9 G/DL (ref 2.3–3.5)
GLUCOSE SERPL-MCNC: 112 MG/DL (ref 70–99)
HCT VFR BLD AUTO: 38.2 % (ref 37–47)
HGB BLD-MCNC: 12.6 G/DL (ref 12–16)
LYMPHOCYTES # BLD: 0.8 K/UL (ref 1–4.8)
LYMPHOCYTES NFR BLD: 11 %
MCH RBC QN AUTO: 30 PG (ref 27–31.3)
MCHC RBC AUTO-ENTMCNC: 33 % (ref 33–37)
MCV RBC AUTO: 91 FL (ref 79.4–94.8)
MONOCYTES # BLD: 0.7 K/UL (ref 0.2–0.8)
MONOCYTES NFR BLD: 9.6 %
NEUTROPHILS # BLD: 5.5 K/UL (ref 1.4–6.5)
NEUTS SEG NFR BLD: 76.7 %
PLATELET # BLD AUTO: 210 K/UL (ref 130–400)
POTASSIUM SERPL-SCNC: 3.6 MEQ/L (ref 3.4–4.9)
PROT SERPL-MCNC: 6.2 G/DL (ref 6.3–8)
RBC # BLD AUTO: 4.2 M/UL (ref 4.2–5.4)
SODIUM SERPL-SCNC: 135 MEQ/L (ref 135–144)
WBC # BLD AUTO: 7.1 K/UL (ref 4.8–10.8)

## 2024-07-05 PROCEDURE — 97535 SELF CARE MNGMENT TRAINING: CPT

## 2024-07-05 PROCEDURE — 99223 1ST HOSP IP/OBS HIGH 75: CPT | Performed by: INTERNAL MEDICINE

## 2024-07-05 PROCEDURE — 80053 COMPREHEN METABOLIC PANEL: CPT

## 2024-07-05 PROCEDURE — 6370000000 HC RX 637 (ALT 250 FOR IP)

## 2024-07-05 PROCEDURE — 36415 COLL VENOUS BLD VENIPUNCTURE: CPT

## 2024-07-05 PROCEDURE — 6360000002 HC RX W HCPCS

## 2024-07-05 PROCEDURE — 97110 THERAPEUTIC EXERCISES: CPT

## 2024-07-05 PROCEDURE — 2580000003 HC RX 258: Performed by: INTERNAL MEDICINE

## 2024-07-05 PROCEDURE — 2580000003 HC RX 258

## 2024-07-05 PROCEDURE — 6360000002 HC RX W HCPCS: Performed by: INTERNAL MEDICINE

## 2024-07-05 PROCEDURE — 2700000000 HC OXYGEN THERAPY PER DAY

## 2024-07-05 PROCEDURE — 85025 COMPLETE CBC W/AUTO DIFF WBC: CPT

## 2024-07-05 PROCEDURE — 6370000000 HC RX 637 (ALT 250 FOR IP): Performed by: INTERNAL MEDICINE

## 2024-07-05 PROCEDURE — 93010 ELECTROCARDIOGRAM REPORT: CPT | Performed by: INTERNAL MEDICINE

## 2024-07-05 RX ORDER — CEFADROXIL 500 MG/1
500 CAPSULE ORAL 2 TIMES DAILY
Qty: 6 CAPSULE | Refills: 0 | Status: SHIPPED | OUTPATIENT
Start: 2024-07-05 | End: 2024-07-08

## 2024-07-05 RX ORDER — COLCHICINE 0.6 MG/1
0.6 TABLET ORAL DAILY
Qty: 15 TABLET | Refills: 0 | Status: SHIPPED | OUTPATIENT
Start: 2024-07-06 | End: 2024-07-21

## 2024-07-05 RX ADMIN — COLCHICINE 0.6 MG: 0.6 TABLET, FILM COATED ORAL at 08:43

## 2024-07-05 RX ADMIN — KETOROLAC TROMETHAMINE 15 MG: 15 INJECTION, SOLUTION INTRAMUSCULAR; INTRAVENOUS at 08:43

## 2024-07-05 RX ADMIN — FUROSEMIDE 40 MG: 40 TABLET ORAL at 08:43

## 2024-07-05 RX ADMIN — MICONAZOLE NITRATE: 2 POWDER TOPICAL at 08:49

## 2024-07-05 RX ADMIN — ASPIRIN 81 MG: 81 TABLET, COATED ORAL at 08:43

## 2024-07-05 RX ADMIN — PANTOPRAZOLE SODIUM 40 MG: 40 TABLET, DELAYED RELEASE ORAL at 05:40

## 2024-07-05 RX ADMIN — CEFTRIAXONE SODIUM 1000 MG: 1 INJECTION, POWDER, FOR SOLUTION INTRAMUSCULAR; INTRAVENOUS at 08:56

## 2024-07-05 RX ADMIN — ROSUVASTATIN CALCIUM 10 MG: 10 TABLET, FILM COATED ORAL at 08:43

## 2024-07-05 RX ADMIN — GABAPENTIN 100 MG: 100 CAPSULE ORAL at 08:43

## 2024-07-05 RX ADMIN — SODIUM CHLORIDE, POTASSIUM CHLORIDE, SODIUM LACTATE AND CALCIUM CHLORIDE: 600; 310; 30; 20 INJECTION, SOLUTION INTRAVENOUS at 08:51

## 2024-07-05 RX ADMIN — LEVOTHYROXINE SODIUM 50 MCG: 0.05 TABLET ORAL at 05:40

## 2024-07-05 RX ADMIN — Medication 10 ML: at 08:49

## 2024-07-05 RX ADMIN — ENOXAPARIN SODIUM 40 MG: 100 INJECTION SUBCUTANEOUS at 08:43

## 2024-07-05 ASSESSMENT — PAIN SCALES - GENERAL: PAINLEVEL_OUTOF10: 7

## 2024-07-05 ASSESSMENT — PAIN DESCRIPTION - LOCATION: LOCATION: GENERALIZED

## 2024-07-05 NOTE — DISCHARGE INSTRUCTIONS
Weight bear as tolerated- pt has a walk about boot for comfort when ambulating to right    Ok to remove all other times   If pt feels ok and wants to - ok to ambulate without the boot

## 2024-07-05 NOTE — ACP (ADVANCE CARE PLANNING)
Advance Care Planning   Healthcare Decision Maker:    Primary Decision Maker: Modesta Guerra - Child - [phone]    Supplemental (Other) Decision Maker: KIMBERLY THURSTON - [place] - [phone]

## 2024-07-05 NOTE — PROGRESS NOTES
Pt is much better- swelling and tenderness of ankle since the administration of colchicine   She has hard time moving with the boot as it is heavy   She still had it on in beds   I removed it to check the ankle   Spoke with pt and nursing   Ok to not weak- for comfort only and when help- for ambulation as needed    Otherwise pt does not have any noted fractures- therefore ok to put weight on the leg  She does have ligament injury -and boot may be helpful in that short term

## 2024-07-05 NOTE — CARE COORDINATION
Case Management Assessment  Initial Evaluation    Date/Time of Evaluation: 7/5/2024 12:33 PM  Assessment Completed by: Evelina Cantu RN    If patient is discharged prior to next notation, then this note serves as note for discharge by case management.    Patient Name: Dennise Sosa                   YOB: 1933  Diagnosis: Suspected urinary tract infection [R39.89]                   Date / Time: 7/3/2024  3:11 PM    Patient Admission Status: Inpatient   Readmission Risk (Low < 19, Mod (19-27), High > 27): Readmission Risk Score: 12.4    Current PCP: Tova Mazariegos PA-C  PCP verified by CM? Yes    Chart Reviewed: Yes      History Provided by: Patient  Patient Orientation: Alert and Oriented    Patient Cognition: Alert    Hospitalization in the last 30 days (Readmission):  No    If yes, Readmission Assessment in CM Navigator will be completed.    Advance Directives:      Code Status: DNR-CCA   Patient's Primary Decision Maker is: Named in Scanned ACP Document    Primary Decision Maker: Modesta Guerra - Child - [phone]    Secondary Decision Maker: KIMBERLY THURSTON  Child - [phone]    Discharge Planning:    Patient lives with: Children Type of Home: House  Primary Care Giver: Self  Patient Support Systems include: Children   Current Financial resources: Medicare  Current community resources: Housing  Current services prior to admission: Oxygen Therapy ([employer])            Current DME:              Type of Home Care services:  None    ADLS  Prior functional level: Independent in ADLs/IADLs  Current functional level: Mobility    PT AM-PAC: 10 /24  OT AM-PAC: 13 /24    Family can provide assistance at DC: Yes (DTR HERSON)  Would you like Case Management to discuss the discharge plan with any other family members/significant others, and if so, who? Yes  Plans to Return to Present Housing: Unknown at present  Other Identified Issues/Barriers to RETURNING to current housing:  MEDICAL COMPLICATION, CONSULTS. PT/OT EVALS   Potential Assistance needed at discharge: Home Care, Skilled Nursing Facility            Potential DME:    Patient expects to discharge to: House  Plan for transportation at discharge: Family (DTR)    Financial    Payor: MEDICARE / Plan: MEDICARE PART A AND B / Product Type: *No Product type* /     Does insurance require precert for SNF: No    Potential assistance Purchasing Medications: No  Meds-to-Beds request: Yes      [facility] - [place], [place] - [address] - P [phone] - F [phone]  [address] [place] [address]  Phone: [phone] Fax: [phone]    [facility] - [place], [place] - [address] - P [phone] - F [phone]  [address]  [facility] [address]  [facility] [address]  Phone: [phone] Fax: [phone]      Notes:    Factors facilitating achievement of predicted outcomes: Family support, Motivated, Cooperative, Pleasant, Sense of humor, Good insight into deficits, and Has needed Durable Medical Equipment at home    Barriers to discharge: Lower extremity weakness, Long standing deficits, Medical complications, Stairs at home, and Medication managment    Additional Case Management Notes: MET W/PT TO ASSESS NEEDS AND DISCUSS DISCHARGE PLAN WHICH IS HOME W/SON WHO IS ALSO DISABLED. PT STATES THEY ARE BEING EVICTED AND REQUESTS RESOURCES FOR HOUSING. LSW INFORMED. PT IS OPEN TO HHC/SNF/REHAB IF INDICATED. HAS DME, HOME O2, UNSURE OF SUPPLIER. NOT A . NO HD. WILL FOLLOW FOR NEEDS.     The Plan for Transition of Care is related to the following treatment goals of Suspected urinary tract infection [R39.89]    IF APPLICABLE: The Patient and/or patient representative Dennise and her family were provided with a choice of provider and agrees with the discharge plan. Freedom of choice list with basic dialogue that supports the patient's individualized plan of care/goals and shares the quality

## 2024-07-05 NOTE — PROGRESS NOTES
assistance  Bed Mobility Comments: HOB flat with use of bed rails, pt stated she has bed rails at home.  Increase time and effort to complete d/t fatigue and weakness. Vc's for sequencing and technique.    Transfers  Sit to Stand: Maximum Assistance  Comment: Attempted STS x 2 pt unable to complete full stand.  After second attempt pt stated that was enough and requested to return BTB.      PT Exercises  Exercise Treatment: Seated AP/ LAQ/ marching/ hip abd x 10          Activity Tolerance  Activity Tolerance: Patient limited by fatigue;Patient limited by endurance          ASSESSMENT   Assessment: Pt able to complete bed mobility with less assistance this session desipte having increase fatigue.  Once sitting EOB pt attempted to stand with WW and therpaist assistance, however unable to generate enough force to complete STS. Decrease rebecca knee extension during transfers. Unsafe to trial further transfers d/t increase weakness and fatigue.  Pt return BTB and positioned for comfort.     Discharge Recommendations:  Continue to assess pending progress         Goals  Long Term Goals  Long Term Goal 1: min assist bed mobility  Long Term Goal 2: min assist sit to stand  Long Term Goal 3: min assist gait with appropriate device 20 feet  Long Term Goal 4: pt able to stand with ww 2 min with min assist    PLAN    General Plan: 1 time a day 3-6 times a week  Safety Devices  Type of Devices: All fall risk precautions in place, Call light within reach, Bed alarm in place, Left in bed     [facility] (6 CLICK) BASIC MOBILITY  AM-PAC Inpatient Mobility Raw Score : 10     Therapy Time   Individual   Time In 0956   Time Out 1019   Minutes 23      BM/ Transfers: 15   Gait: 8       RODERICK VIOLETA, PTA, 07/05/24 at 10:24 AM         Definitions for assistance levels  Independent = pt does not require any physical supervision or assistance from another person for activity completion. Device may be needed.  Stand by assistance = pt requires verbal  cues or instructions from another person, close to but not touching, to perform the activity  Minimal assistance= pt performs 75% or more of the activity; assistance is required to complete the activity  Moderate assistance= pt performs 50% of the activity; assistance is required to complete the activity  Maximal assistance = pt performs 25% of the activity; assistance is required to complete the activity  Dependent = pt requires total physical assistance to accomplish the task

## 2024-07-05 NOTE — DISCHARGE SUMMARY
Hospital Medicine Discharge Summary    Dennise Sosa  :  1933  MRN:  [id number]    Admit date:  7/3/2024  Discharge date:  2024    Admitting Physician:  Gloria Gutierrez DO  Primary Care Physician:  Tova Mazariegos PA-C    Dennise Sosa is a 89 y.o. female that was admitted and treated for the following medical issues:     Principal Problem:    Suspected urinary tract infection  Active Problems:    Sprain of right ankle    Inability to ambulate due to ankle or foot  Resolved Problems:    * No resolved hospital problems. *      Discharge Diagnoses:    Principal Problem:    Suspected urinary tract infection  Active Problems:    Sprain of right ankle    Inability to ambulate due to ankle or foot  Resolved Problems:    * No resolved hospital problems. *    Chief Complaint   Patient presents with    Fatigue     For a week  Pt denies n/v/d       Hospital Course:   Dennise Sosa is a 89 y.o. female who was admitted to the hospital with ankle pain and dysuria with increased frequency.  Ankle pain resolved with colchicine, suspected to have gout, primarily evaluated by orthopedic surgery.  Dysuria and increased frequency resolved with IV ceftriaxone which was subsequently transitioned to oral cefadroxil on discharge  Pt was discharge in a stable condition.       BP (!) 154/74   Pulse 77   Temp 98.1 °F (36.7 °C)   Resp 20   Ht 1.651 m (5' 5\")   Wt 81.9 kg (180 lb 8 oz)   SpO2 98%   BMI 30.04 kg/m²     Patient was seen by the following consultants  Consults:  IP CONSULT TO CASE MANAGEMENT  IP CONSULT TO ORTHOPEDIC SURGERY  IP CONSULT TO CARDIOLOGY    Significant Diagnostic Studies:    Refer to chart if  XR ANKLE RIGHT (MIN 3 VIEWS)    Result Date: 2024  EXAMINATION: THREE XRAY VIEWS OF THE LEFT ANKLE; THREE XRAY VIEWS OF THE RIGHT ANKLE 7/3/2024 10:26 pm COMPARISON: None. HISTORY: ORDERING SYSTEM PROVIDED HISTORY: pain / unable to bear weight TECHNOLOGIST PROVIDED HISTORY: Reason for exam:->pain /  time of discharge.    Activity: activity as tolerated    Total time taken for discharging this patient: 40 minutes. Greater than 70% of time was spent focused exclusively on this patient. Time was taken to review chart, discuss plans with consultants, reconciling medications, discussing plan answering questions with patient.     Signed:  HELIO MICHELLE MD

## 2024-07-05 NOTE — PROGRESS NOTES
Hospitalist Progress Note      Date of Admission: 7/3/2024  Chief Complaint:    Chief Complaint   Patient presents with    Fatigue     For a week  Pt denies n/v/d     Subjective:  No new complaints.  No nausea, vomiting, chest pain, or headache      Medications:    Infusion Medications    sodium chloride      lactated ringers IV soln 100 mL/hr at 07/05/24 0851     Scheduled Medications    metoprolol tartrate  25 mg Oral BID    miconazole   Topical BID    aspirin  81 mg Oral Daily    furosemide  40 mg Oral Daily    gabapentin  100 mg Oral BID    levothyroxine  50 mcg Oral QAM AC    pantoprazole  40 mg Oral QAM AC    rosuvastatin  10 mg Oral Daily    sertraline  50 mg Oral Nightly    valsartan  320 mg Oral Nightly    colchicine  0.6 mg Oral Daily    cefTRIAXone (ROCEPHIN) IV  1,000 mg IntraVENous Q24H    sodium chloride flush  5-40 mL IntraVENous 2 times per day    enoxaparin  40 mg SubCUTAneous Daily    melatonin  3 mg Oral Nightly     PRN Meds: ketorolac, sodium chloride flush, sodium chloride, potassium chloride **OR** potassium alternative oral replacement **OR** potassium chloride, magnesium sulfate, ondansetron **OR** ondansetron, polyethylene glycol, acetaminophen **OR** acetaminophen    Intake/Output Summary (Last 24 hours) at 7/5/2024 1632  Last data filed at 7/5/2024 0746  Gross per 24 hour   Intake 3222.08 ml   Output 800 ml   Net 2422.08 ml       Exam:  BP (!) 154/74   Pulse 77   Temp 98.1 °F (36.7 °C)   Resp 20   Ht 1.651 m (5' 5\")   Wt 81.9 kg (180 lb 8 oz)   SpO2 98%   BMI 30.04 kg/m²   Head: Normocephalic, atraumatic  Sclera clear  Neck JVD flat  Lungs: Normal effort of breathing    Labs:   Recent Labs     07/03/24  1623 07/04/24  0548 07/05/24  0554   WBC 8.5 5.0 7.1   HGB 13.9 12.7 12.6   HCT 42.0 38.1 38.2    188 210       Recent Labs     07/03/24  1623 07/04/24  0548 07/05/24  0554   * 141 135   K 3.7 3.6 3.6   CL 95 103 100   CO2 24 28 27   BUN 16 14 13   CREATININE 0.58 0.49*  0.51   CALCIUM 9.2 9.0 8.6   AST 15 17 25   ALT 9 11 20   BILITOT 1.0* 0.7 0.4   ALKPHOS 81 70 78       No results for input(s): \"INR\" in the last 72 hours.  No results for input(s): \"CKTOTAL\", \"TROPONINI\" in the last 72 hours.  Radiology:  XR ANKLE RIGHT (MIN 3 VIEWS)   Final Result   1. No acute osseous abnormality of the right or left ankle.   2. Mild widening of the medial tibiotalar joint space and lateral ankle   gutter of the right ankle, which could suggest underlying ligamentous injury.   3. Marked soft tissue swelling of the right lateral ankle.         XR ANKLE LEFT (MIN 3 VIEWS)   Final Result   1. No acute osseous abnormality of the right or left ankle.   2. Mild widening of the medial tibiotalar joint space and lateral ankle   gutter of the right ankle, which could suggest underlying ligamentous injury.   3. Marked soft tissue swelling of the right lateral ankle.         XR CHEST PORTABLE   Final Result   1. No acute cardiopulmonary disease.   2. Large hiatal hernia.           Assessment/Plan:    Bilateral ankle/foot pain:  Appreciate orthopedic input.  Most likely secondary to gout.  Colchicine initiated.    Urinary tract infection, symptomatic.  Obtain urine culture, mild pyuria, IV antibiotics initiated.  Follow cultures.    Atrial fibrillation: Rate controlled, not on ac.  Appreciate cardiology input.      HTN: monitor BP, adjust meds as needed  Hpl: statin    Dc plan: possible dc today    HELIO MICHELLE MD ,MD

## 2024-07-05 NOTE — CONSULTS
Genitourinary: No hematuria.  No CKD  Hematological: + Easy bruising but no excessive bleeding on aspirin  Vascular: No lower extremity edema or claudication.  Neurological: No TIA or CVA symptoms. No paresthesias.   Musculoskeletal: No chest wall pain.  Psychiatric: No anxiety.   Skin: No rashes or lesions. No cyanosis.     PHYSICAL EXAM  BP (!) 154/74   Pulse 73   Temp 98.1 °F (36.7 °C) (Oral)   Resp 20   Ht 1.651 m (5' 5\")   Wt 81.9 kg (180 lb 8 oz)   SpO2 97%   BMI 30.04 kg/m²   Constitutional: alert, cooperative, in no distress  Eyes: Conjunctiva clear; no scleral icturus. PERRLA   Respiratory: clear to auscultation bilaterally  Musculoskeletal: No chest wall tenderness. No clubbing or cyanosis.   Cardiovascular: regular rate and rhythm, S1, S2 normal, no murmur, click, rub or gallop. No carotid bruit. No JVD. Pulses 2+ and symmetric.   GI: soft, non-tender, non-distended. Bowel sounds normal. No masses,  no organomegaly  MSK: extremities normal, atraumatic, no clubbing. No chest wall pain.  + Ankle pains  Neurologic: Cranial nerves grossly intact.  No focal motor and/or sensory deficits.  Skin: No rashes or lesions. No cyanosis.       Recent Labs     07/03/24  1623 07/04/24  0548 07/05/24  0554   HGB 13.9 12.7 12.6   WBC 8.5 5.0 7.1    188 210   * 141 135   K 3.7 3.6 3.6   CO2 24 28 27   BUN 16 14 13   CREATININE 0.58 0.49* 0.51   MG 1.7  --   --    PROBNP 864  --   --        Lab Results   Component Value Date/Time    PROBNP 864 07/03/2024 04:23 PM       EKG: My independent review reveals atrial fibrillation, rate controlled.  Heart rate 94 bpm.  RBBB and left intrafascicular block consistent with bifascicular block.  No significant ST-T wave changes    Telemetry: My independent review reveals sinus rhythm heart rates 50s to 60s however she was in atrial fibrillation with heart rate 60s to 70s earlier today.    Chest x-ray:  1. No acute cardiopulmonary disease.  2. Large hiatal  hernia.    2D Echo: 2020   Normal tricuspid valve structure and function.   Mild TR   RVSP 28 mmHg   Normal left ventricle structure and function.   Left ventricular ejection fraction is visually estimated at 60%.   Restrictive filling pattern.    Last Holter monitor done in 2020 showed sinus rhythm with bundle branch block.  1 episode of nonsustained PSVT at that time with heart rates as high as 140 bpm.      Assessment/Impression:   Paroxysmal atrial fibrillation, rate controlled, currently sinus.  [id number] score of at least 5 indicating high risk for stroke.  HAS-BLED score of 2 indicating moderate risk for bleeding.  Asymptomatic bifascicular block.  Historically normal LV systolic function, last known EF 60%  PAD  Hypertension  Hyperlipidemia  DHARA    . Principal Problem:    Suspected urinary tract infection  Resolved Problems:    * No resolved hospital problems. *      Recommendations:   Continue telemetry monitoring.  Add metoprolol 25 mg twice daily for rate/rhythm control as well as BP control.  Discussed risks and benefits of long-term anticoagulation with patient in detail.  She understands there is risk of bleeding with the use of any potential blood thinner but based off above risk calculators her benefit of OAC still outweighs risk.  She would like to think about it.  Continue aspirin low-dose in the meantime.  Echocardiogram for structural heart evaluation.  Further cardiac recommendations to follow pending her clinical course.      Thank you very much for allowing me to participate in the cardiac care of your patient. Should you have questions, please feel free to contact me.    Brendan Min DO, FACC, FACOI

## 2024-07-08 ENCOUNTER — TELEPHONE (OUTPATIENT)
Dept: FAMILY MEDICINE CLINIC | Age: 89
End: 2024-07-08

## 2024-07-08 LAB
BACTERIA BLD CULT ORG #2: NORMAL
BACTERIA BLD CULT: NORMAL

## 2024-07-08 NOTE — TELEPHONE ENCOUNTER
Care Transitions Initial Follow Up Call    Outreach made within 2 business days of discharge: Yes    Patient: Dennise Sosa Patient : 1933   MRN: [id number]  Reason for Admission: There are no discharge diagnoses documented for the most recent discharge.  Discharge Date: 24       Spoke with: VOICE MAIL NOT SETUP    Discharge department/facility: [facility]    TCM Interactive Patient Contact:    Scheduled appointment with PCP within 7-14 days    Follow Up  No future appointments.    Evelina Bosch, MA

## 2024-07-08 NOTE — PROGRESS NOTES
Physical Therapy  Facility/Department: [facility] MED SURG W495/W495-01  Physical Therapy Discharge      NAME: Dennise Soas    : 1933 (89 y.o.)  MRN: [id number]    Account: [id number]  Gender: female      Patient has been discharged from [facility] hospital. DC patient from current PT program.      Electronically signed by Desi Can PT on 24 at 11:50 AM EDT

## 2024-07-08 NOTE — TELEPHONE ENCOUNTER
Care Transitions Initial Follow Up Call    Outreach made within 2 business days of discharge: Yes    Patient: Dennise Sosa Patient : 1933   MRN: [id number]  Reason for Admission: There are no discharge diagnoses documented for the most recent discharge.  Discharge Date: 24       Spoke with: VOICEMAIL IS NOT SET UP AT THIS TIME     Discharge department/facility: [facility]    TCM Interactive Patient Contact:    Scheduled appointment with PCP within 7-14 days    Follow Up  No future appointments.    Evelina Bosch MA

## 2024-07-09 NOTE — TELEPHONE ENCOUNTER
Care Transitions Initial Follow Up Call    Outreach made within 2 business days of discharge: Yes    Patient: Dennise Sosa Patient : 1933   MRN: [id number]  Reason for Admission: There are no discharge diagnoses documented for the most recent discharge.  Discharge Date: 24       Spoke with: PT      Discharge department/facility: [facility]    TCM Interactive Patient Contact:  Was patient able to fill all prescriptions: Yes  Was patient instructed to bring all medications to the follow-up visit: Yes  Is patient taking all medications as directed in the discharge summary? Yes  Does patient understand their discharge instructions: Yes  Does patient have questions or concerns that need addressed prior to 7-14 day follow up office visit: no    Scheduled appointment with PCP within 7-14 days  REUSED    Follow Up  No future appointments.    Evelina Bosch MA

## 2024-07-24 RX ORDER — FUROSEMIDE 40 MG/1
40 TABLET ORAL DAILY
Qty: 30 TABLET | Refills: 6 | OUTPATIENT
Start: 2024-07-24

## 2024-07-24 NOTE — TELEPHONE ENCOUNTER
Patient needs OV. No refills given    Requesting medication refill. Please approve or deny this request.    Rx requested:  Requested Prescriptions     Pending Prescriptions Disp Refills    furosemide (LASIX) 40 MG tablet [Pharmacy Med Name: furosemide 40 mg tablet] 30 tablet 6     Sig: Take 1 tablet by mouth daily         Last Office Visit:   6/3/2022      Next Visit Date:  No future appointments.            Last refill 04/17/2023. Please approve or deny.

## 2024-07-29 DIAGNOSIS — H93.8X1 SENSATION OF FULLNESS IN RIGHT EAR: ICD-10-CM

## 2024-07-29 NOTE — TELEPHONE ENCOUNTER
Comments:     Last Office Visit (last PCP visit):   1/8/2024    Next Visit Date:  No future appointments.    **If hasn't been seen in over a year OR hasn't followed up according to last diabetes/ADHD visit, make appointment for patient before sending refill to provider.    Rx requested:  Requested Prescriptions     Pending Prescriptions Disp Refills    fluticasone (FLONASE) 50 MCG/ACT nasal spray [Pharmacy Med Name: fluticasone propionate 50 mcg/actuation nasal spray,suspension] 16 g 1     Sig: INHALE 2 sprays IN EACH NOSTRIL TWICE DAILY AS NEEDED for congestion

## 2024-07-30 RX ORDER — FLUTICASONE PROPIONATE 50 MCG
SPRAY, SUSPENSION (ML) NASAL
Qty: 16 G | Refills: 1 | Status: SHIPPED | OUTPATIENT
Start: 2024-07-30

## 2024-09-11 NOTE — TELEPHONE ENCOUNTER
Rx request   Requested Prescriptions     Pending Prescriptions Disp Refills    fluticasone (FLONASE) 50 MCG/ACT nasal spray [Pharmacy Med Name: fluticasone propionate 50 mcg/actuation nasal spray,suspension] 16 g 1     Sig: INHALE 2 sprays IN EACH NOSTRIL TWICE DAILY AS NEEDED for congestion    levothyroxine (SYNTHROID) 50 MCG tablet [Pharmacy Med Name: levothyroxine 50 mcg tablet] 90 tablet 1     Sig: TAKE 1 TABLET BY MOUTH EVERY MORNING before breakfast    pantoprazole (PROTONIX) 40 MG tablet [Pharmacy Med Name: pantoprazole 40 mg tablet,delayed release] 90 tablet 1     Sig: TAKE 1 TABLET BY MOUTH DAILY AS NEEDED for heartburn    diclofenac sodium (VOLTAREN) 1 % GEL [Pharmacy Med Name: diclofenac 1 % topical gel] 200 g 1     Sig: apply FOUR grams topically FOUR TIMES DAILY    valsartan (DIOVAN) 160 MG tablet [Pharmacy Med Name: valsartan 160 mg tablet] 180 tablet 1     Sig: TAKE 2 TABLETS BY MOUTH NIGHTLY     LOV 4/28/2022  Next Visit Date:  Future Appointments   Date Time Provider Mary Parekh   5/11/2023 11:45 AM Elizabeth Buenrostro PA-C [address]  PROCEDURES:  Repair of retinal detachment with vitrectomy 11-Sep-2024 17:43:57  Demetrio Nick

## 2024-09-21 ENCOUNTER — APPOINTMENT (OUTPATIENT)
Dept: GENERAL RADIOLOGY | Age: 89
DRG: 759 | End: 2024-09-21
Payer: MEDICARE

## 2024-09-21 ENCOUNTER — HOSPITAL ENCOUNTER (INPATIENT)
Age: 89
LOS: 3 days | Discharge: NURSING, REHAB OR LONG-TERM CARE | DRG: 759 | End: 2024-09-25
Admitting: FAMILY MEDICINE
Payer: MEDICARE

## 2024-09-21 DIAGNOSIS — Z78.9 UNABLE TO CARE FOR SELF: ICD-10-CM

## 2024-09-21 DIAGNOSIS — N30.00 ACUTE CYSTITIS WITHOUT HEMATURIA: Primary | ICD-10-CM

## 2024-09-21 DIAGNOSIS — R41.82 ALTERED MENTAL STATUS, UNSPECIFIED ALTERED MENTAL STATUS TYPE: ICD-10-CM

## 2024-09-21 PROBLEM — R53.1 WEAKNESS GENERALIZED: Status: ACTIVE | Noted: 2024-09-21

## 2024-09-21 LAB
ANION GAP SERPL CALCULATED.3IONS-SCNC: 9 MEQ/L (ref 9–15)
BACTERIA URNS QL MICRO: ABNORMAL /HPF
BASOPHILS # BLD: 0 K/UL (ref 0–0.2)
BASOPHILS NFR BLD: 0.6 %
BILIRUB UR QL STRIP: NEGATIVE
BUN SERPL-MCNC: 23 MG/DL (ref 8–23)
CALCIUM SERPL-MCNC: 8.9 MG/DL (ref 8.5–9.9)
CHLORIDE SERPL-SCNC: 101 MEQ/L (ref 95–107)
CLARITY UR: CLEAR
CO2 SERPL-SCNC: 31 MEQ/L (ref 20–31)
COLOR UR: ABNORMAL
CREAT SERPL-MCNC: 0.54 MG/DL (ref 0.5–0.9)
EOSINOPHIL # BLD: 0.1 K/UL (ref 0–0.7)
EOSINOPHIL NFR BLD: 1.3 %
EPI CELLS #/AREA URNS AUTO: ABNORMAL /HPF (ref 0–5)
ERYTHROCYTE [DISTWIDTH] IN BLOOD BY AUTOMATED COUNT: 14.1 % (ref 11.5–14.5)
GLUCOSE SERPL-MCNC: 96 MG/DL (ref 70–99)
GLUCOSE UR STRIP-MCNC: NEGATIVE MG/DL
HCT VFR BLD AUTO: 38.6 % (ref 37–47)
HGB BLD-MCNC: 12.6 G/DL (ref 12–16)
HGB UR QL STRIP: NEGATIVE
HYALINE CASTS #/AREA URNS AUTO: ABNORMAL /HPF (ref 0–5)
KETONES UR STRIP-MCNC: ABNORMAL MG/DL
LEUKOCYTE ESTERASE UR QL STRIP: ABNORMAL
LYMPHOCYTES # BLD: 0.6 K/UL (ref 1–4.8)
LYMPHOCYTES NFR BLD: 9.9 %
MAGNESIUM SERPL-MCNC: 1.8 MG/DL (ref 1.7–2.4)
MCH RBC QN AUTO: 29.9 PG (ref 27–31.3)
MCHC RBC AUTO-ENTMCNC: 32.6 % (ref 33–37)
MCV RBC AUTO: 91.5 FL (ref 79.4–94.8)
MONOCYTES # BLD: 0.8 K/UL (ref 0.2–0.8)
MONOCYTES NFR BLD: 12.1 %
NEUTROPHILS # BLD: 4.8 K/UL (ref 1.4–6.5)
NEUTS SEG NFR BLD: 75.8 %
NITRITE UR QL STRIP: NEGATIVE
PH UR STRIP: 5 [PH] (ref 5–9)
PLATELET # BLD AUTO: 240 K/UL (ref 130–400)
POTASSIUM SERPL-SCNC: 3.5 MEQ/L (ref 3.4–4.9)
PROT UR STRIP-MCNC: ABNORMAL MG/DL
RBC # BLD AUTO: 4.22 M/UL (ref 4.2–5.4)
RBC #/AREA URNS HPF: ABNORMAL /HPF (ref 0–2)
SARS-COV-2 RDRP RESP QL NAA+PROBE: NOT DETECTED
SODIUM SERPL-SCNC: 141 MEQ/L (ref 135–144)
SP GR UR STRIP: 1.03 (ref 1–1.03)
TSH REFLEX: 2.29 UIU/ML (ref 0.44–3.86)
UROBILINOGEN UR STRIP-ACNC: 1 E.U./DL
WBC # BLD AUTO: 6.4 K/UL (ref 4.8–10.8)
WBC #/AREA URNS AUTO: ABNORMAL /HPF (ref 0–5)
YEAST URNS QL MICRO: PRESENT /HPF

## 2024-09-21 PROCEDURE — 2580000003 HC RX 258

## 2024-09-21 PROCEDURE — 85025 COMPLETE CBC W/AUTO DIFF WBC: CPT

## 2024-09-21 PROCEDURE — 87635 SARS-COV-2 COVID-19 AMP PRB: CPT

## 2024-09-21 PROCEDURE — 81001 URINALYSIS AUTO W/SCOPE: CPT

## 2024-09-21 PROCEDURE — 6370000000 HC RX 637 (ALT 250 FOR IP): Performed by: NURSE PRACTITIONER

## 2024-09-21 PROCEDURE — 96365 THER/PROPH/DIAG IV INF INIT: CPT

## 2024-09-21 PROCEDURE — 84443 ASSAY THYROID STIM HORMONE: CPT

## 2024-09-21 PROCEDURE — G0378 HOSPITAL OBSERVATION PER HR: HCPCS

## 2024-09-21 PROCEDURE — 99285 EMERGENCY DEPT VISIT HI MDM: CPT

## 2024-09-21 PROCEDURE — 71045 X-RAY EXAM CHEST 1 VIEW: CPT

## 2024-09-21 PROCEDURE — G0378 HOSPITAL OBSERVATION PER HR: HCPCS | Performed by: INTERNAL MEDICINE

## 2024-09-21 PROCEDURE — 6360000002 HC RX W HCPCS: Performed by: NURSE PRACTITIONER

## 2024-09-21 PROCEDURE — 93005 ELECTROCARDIOGRAM TRACING: CPT

## 2024-09-21 PROCEDURE — 80048 BASIC METABOLIC PNL TOTAL CA: CPT

## 2024-09-21 PROCEDURE — 73130 X-RAY EXAM OF HAND: CPT

## 2024-09-21 PROCEDURE — 6360000002 HC RX W HCPCS

## 2024-09-21 PROCEDURE — 83735 ASSAY OF MAGNESIUM: CPT

## 2024-09-21 PROCEDURE — 2500000003 HC RX 250 WO HCPCS: Performed by: NURSE PRACTITIONER

## 2024-09-21 RX ORDER — ONDANSETRON 2 MG/ML
4 INJECTION INTRAMUSCULAR; INTRAVENOUS EVERY 6 HOURS PRN
Status: DISCONTINUED | OUTPATIENT
Start: 2024-09-21 | End: 2024-09-25 | Stop reason: HOSPADM

## 2024-09-21 RX ORDER — POTASSIUM CHLORIDE 1500 MG/1
40 TABLET, EXTENDED RELEASE ORAL PRN
Status: DISCONTINUED | OUTPATIENT
Start: 2024-09-21 | End: 2024-09-25 | Stop reason: HOSPADM

## 2024-09-21 RX ORDER — MAGNESIUM SULFATE IN WATER 40 MG/ML
2000 INJECTION, SOLUTION INTRAVENOUS ONCE
Status: COMPLETED | OUTPATIENT
Start: 2024-09-21 | End: 2024-09-22

## 2024-09-21 RX ORDER — SODIUM CHLORIDE 0.9 % (FLUSH) 0.9 %
5-40 SYRINGE (ML) INJECTION PRN
Status: DISCONTINUED | OUTPATIENT
Start: 2024-09-21 | End: 2024-09-25 | Stop reason: HOSPADM

## 2024-09-21 RX ORDER — 0.9 % SODIUM CHLORIDE 0.9 %
500 INTRAVENOUS SOLUTION INTRAVENOUS ONCE
Status: COMPLETED | OUTPATIENT
Start: 2024-09-21 | End: 2024-09-21

## 2024-09-21 RX ORDER — MAGNESIUM SULFATE IN WATER 40 MG/ML
2000 INJECTION, SOLUTION INTRAVENOUS PRN
Status: DISCONTINUED | OUTPATIENT
Start: 2024-09-21 | End: 2024-09-25 | Stop reason: HOSPADM

## 2024-09-21 RX ORDER — ENOXAPARIN SODIUM 100 MG/ML
40 INJECTION SUBCUTANEOUS DAILY
Status: DISCONTINUED | OUTPATIENT
Start: 2024-09-22 | End: 2024-09-25 | Stop reason: HOSPADM

## 2024-09-21 RX ORDER — SODIUM CHLORIDE 9 MG/ML
INJECTION, SOLUTION INTRAVENOUS PRN
Status: DISCONTINUED | OUTPATIENT
Start: 2024-09-21 | End: 2024-09-25 | Stop reason: HOSPADM

## 2024-09-21 RX ORDER — SODIUM CHLORIDE 0.9 % (FLUSH) 0.9 %
5-40 SYRINGE (ML) INJECTION EVERY 12 HOURS SCHEDULED
Status: DISCONTINUED | OUTPATIENT
Start: 2024-09-21 | End: 2024-09-25 | Stop reason: HOSPADM

## 2024-09-21 RX ORDER — POLYETHYLENE GLYCOL 3350 17 G/17G
17 POWDER, FOR SOLUTION ORAL DAILY PRN
Status: DISCONTINUED | OUTPATIENT
Start: 2024-09-21 | End: 2024-09-25 | Stop reason: HOSPADM

## 2024-09-21 RX ORDER — ACETAMINOPHEN 325 MG/1
650 TABLET ORAL EVERY 6 HOURS PRN
Status: DISCONTINUED | OUTPATIENT
Start: 2024-09-21 | End: 2024-09-25 | Stop reason: HOSPADM

## 2024-09-21 RX ORDER — POTASSIUM CHLORIDE 7.45 MG/ML
10 INJECTION INTRAVENOUS PRN
Status: DISCONTINUED | OUTPATIENT
Start: 2024-09-21 | End: 2024-09-25 | Stop reason: HOSPADM

## 2024-09-21 RX ORDER — ONDANSETRON 4 MG/1
4 TABLET, ORALLY DISINTEGRATING ORAL EVERY 8 HOURS PRN
Status: DISCONTINUED | OUTPATIENT
Start: 2024-09-21 | End: 2024-09-25 | Stop reason: HOSPADM

## 2024-09-21 RX ORDER — ACETAMINOPHEN 650 MG/1
650 SUPPOSITORY RECTAL EVERY 6 HOURS PRN
Status: DISCONTINUED | OUTPATIENT
Start: 2024-09-21 | End: 2024-09-25 | Stop reason: HOSPADM

## 2024-09-21 RX ADMIN — POTASSIUM BICARBONATE 50 MEQ: 978 TABLET, EFFERVESCENT ORAL at 22:23

## 2024-09-21 RX ADMIN — SODIUM CHLORIDE 500 ML: 9 INJECTION, SOLUTION INTRAVENOUS at 18:23

## 2024-09-21 RX ADMIN — CEFTRIAXONE SODIUM 1000 MG: 1 INJECTION, POWDER, FOR SOLUTION INTRAMUSCULAR; INTRAVENOUS at 19:34

## 2024-09-21 RX ADMIN — MICONAZOLE NITRATE: 2 POWDER TOPICAL at 23:31

## 2024-09-21 RX ADMIN — MAGNESIUM SULFATE HEPTAHYDRATE 2000 MG: 40 INJECTION, SOLUTION INTRAVENOUS at 22:30

## 2024-09-21 ASSESSMENT — PAIN SCALES - GENERAL
PAINLEVEL_OUTOF10: 0
PAINLEVEL_OUTOF10: 0

## 2024-09-21 ASSESSMENT — PAIN - FUNCTIONAL ASSESSMENT: PAIN_FUNCTIONAL_ASSESSMENT: 0-10

## 2024-09-22 PROBLEM — N39.0 UTI (URINARY TRACT INFECTION): Status: ACTIVE | Noted: 2024-09-22

## 2024-09-22 LAB
ALBUMIN SERPL-MCNC: 3 G/DL (ref 3.5–4.6)
ALP SERPL-CCNC: 83 U/L (ref 40–130)
ALT SERPL-CCNC: 13 U/L (ref 0–33)
ANION GAP SERPL CALCULATED.3IONS-SCNC: 4 MEQ/L (ref 9–15)
AST SERPL-CCNC: 16 U/L (ref 0–35)
BASOPHILS # BLD: 0 K/UL (ref 0–0.2)
BASOPHILS NFR BLD: 0.4 %
BILIRUB SERPL-MCNC: 0.6 MG/DL (ref 0.2–0.7)
BUN SERPL-MCNC: 18 MG/DL (ref 8–23)
CALCIUM SERPL-MCNC: 8.6 MG/DL (ref 8.5–9.9)
CHLORIDE SERPL-SCNC: 101 MEQ/L (ref 95–107)
CO2 SERPL-SCNC: 33 MEQ/L (ref 20–31)
CREAT SERPL-MCNC: 0.53 MG/DL (ref 0.5–0.9)
EOSINOPHIL # BLD: 0.2 K/UL (ref 0–0.7)
EOSINOPHIL NFR BLD: 3.3 %
ERYTHROCYTE [DISTWIDTH] IN BLOOD BY AUTOMATED COUNT: 14 % (ref 11.5–14.5)
GLOBULIN SER CALC-MCNC: 2.9 G/DL (ref 2.3–3.5)
GLUCOSE SERPL-MCNC: 96 MG/DL (ref 70–99)
HCT VFR BLD AUTO: 35.4 % (ref 37–47)
HGB BLD-MCNC: 11.4 G/DL (ref 12–16)
LYMPHOCYTES # BLD: 0.7 K/UL (ref 1–4.8)
LYMPHOCYTES NFR BLD: 14.2 %
MAGNESIUM SERPL-MCNC: 2.1 MG/DL (ref 1.7–2.4)
MCH RBC QN AUTO: 28.9 PG (ref 27–31.3)
MCHC RBC AUTO-ENTMCNC: 32.2 % (ref 33–37)
MCV RBC AUTO: 89.8 FL (ref 79.4–94.8)
MONOCYTES # BLD: 0.7 K/UL (ref 0.2–0.8)
MONOCYTES NFR BLD: 13.8 %
NEUTROPHILS # BLD: 3.5 K/UL (ref 1.4–6.5)
NEUTS SEG NFR BLD: 67.7 %
PLATELET # BLD AUTO: 225 K/UL (ref 130–400)
POTASSIUM SERPL-SCNC: 4.4 MEQ/L (ref 3.4–4.9)
PROT SERPL-MCNC: 5.9 G/DL (ref 6.3–8)
RBC # BLD AUTO: 3.94 M/UL (ref 4.2–5.4)
SODIUM SERPL-SCNC: 138 MEQ/L (ref 135–144)
WBC # BLD AUTO: 5.1 K/UL (ref 4.8–10.8)

## 2024-09-22 PROCEDURE — 85025 COMPLETE CBC W/AUTO DIFF WBC: CPT

## 2024-09-22 PROCEDURE — 80053 COMPREHEN METABOLIC PANEL: CPT

## 2024-09-22 PROCEDURE — 36415 COLL VENOUS BLD VENIPUNCTURE: CPT

## 2024-09-22 PROCEDURE — 83735 ASSAY OF MAGNESIUM: CPT

## 2024-09-22 PROCEDURE — 2700000000 HC OXYGEN THERAPY PER DAY

## 2024-09-22 PROCEDURE — 2580000003 HC RX 258: Performed by: FAMILY MEDICINE

## 2024-09-22 PROCEDURE — 2580000003 HC RX 258: Performed by: NURSE PRACTITIONER

## 2024-09-22 PROCEDURE — 6360000002 HC RX W HCPCS: Performed by: FAMILY MEDICINE

## 2024-09-22 PROCEDURE — 1210000000 HC MED SURG R&B

## 2024-09-22 PROCEDURE — 6360000002 HC RX W HCPCS: Performed by: NURSE PRACTITIONER

## 2024-09-22 RX ORDER — FLUCONAZOLE 2 MG/ML
200 INJECTION, SOLUTION INTRAVENOUS EVERY 24 HOURS
Status: DISCONTINUED | OUTPATIENT
Start: 2024-09-22 | End: 2024-09-23

## 2024-09-22 RX ADMIN — CEFTRIAXONE SODIUM 1000 MG: 1 INJECTION, POWDER, FOR SOLUTION INTRAMUSCULAR; INTRAVENOUS at 20:26

## 2024-09-22 RX ADMIN — MICONAZOLE NITRATE: 2 POWDER TOPICAL at 08:55

## 2024-09-22 RX ADMIN — ENOXAPARIN SODIUM 40 MG: 100 INJECTION SUBCUTANEOUS at 08:54

## 2024-09-22 RX ADMIN — FLUCONAZOLE 200 MG: 200 INJECTION, SOLUTION INTRAVENOUS at 12:56

## 2024-09-22 RX ADMIN — SODIUM CHLORIDE, PRESERVATIVE FREE 10 ML: 5 INJECTION INTRAVENOUS at 20:26

## 2024-09-22 RX ADMIN — MICONAZOLE NITRATE: 2 POWDER TOPICAL at 20:26

## 2024-09-22 RX ADMIN — SODIUM CHLORIDE, PRESERVATIVE FREE 10 ML: 5 INJECTION INTRAVENOUS at 08:55

## 2024-09-22 ASSESSMENT — PAIN SCALES - GENERAL: PAINLEVEL_OUTOF10: 0

## 2024-09-23 PROBLEM — Z71.89 ADVANCED CARE PLANNING/COUNSELING DISCUSSION: Status: ACTIVE | Noted: 2024-09-23

## 2024-09-23 PROBLEM — Z71.89 GOALS OF CARE, COUNSELING/DISCUSSION: Status: ACTIVE | Noted: 2024-09-23

## 2024-09-23 PROBLEM — Z51.5 PALLIATIVE CARE ENCOUNTER: Status: ACTIVE | Noted: 2024-09-23

## 2024-09-23 LAB
ALBUMIN SERPL-MCNC: 3 G/DL (ref 3.5–4.6)
ALP SERPL-CCNC: 76 U/L (ref 40–130)
ALT SERPL-CCNC: 10 U/L (ref 0–33)
ANION GAP SERPL CALCULATED.3IONS-SCNC: 5 MEQ/L (ref 9–15)
AST SERPL-CCNC: 14 U/L (ref 0–35)
BASOPHILS # BLD: 0 K/UL (ref 0–0.2)
BASOPHILS NFR BLD: 0.7 %
BILIRUB SERPL-MCNC: 0.4 MG/DL (ref 0.2–0.7)
BUN SERPL-MCNC: 12 MG/DL (ref 8–23)
CALCIUM SERPL-MCNC: 8.7 MG/DL (ref 8.5–9.9)
CHLORIDE SERPL-SCNC: 101 MEQ/L (ref 95–107)
CO2 SERPL-SCNC: 33 MEQ/L (ref 20–31)
CREAT SERPL-MCNC: 0.6 MG/DL (ref 0.5–0.9)
EOSINOPHIL # BLD: 0.2 K/UL (ref 0–0.7)
EOSINOPHIL NFR BLD: 3.2 %
ERYTHROCYTE [DISTWIDTH] IN BLOOD BY AUTOMATED COUNT: 14 % (ref 11.5–14.5)
GLOBULIN SER CALC-MCNC: 2.7 G/DL (ref 2.3–3.5)
GLUCOSE SERPL-MCNC: 95 MG/DL (ref 70–99)
HCT VFR BLD AUTO: 35.9 % (ref 37–47)
HGB BLD-MCNC: 11.5 G/DL (ref 12–16)
LYMPHOCYTES # BLD: 0.7 K/UL (ref 1–4.8)
LYMPHOCYTES NFR BLD: 11.9 %
MAGNESIUM SERPL-MCNC: 1.9 MG/DL (ref 1.7–2.4)
MCH RBC QN AUTO: 29.1 PG (ref 27–31.3)
MCHC RBC AUTO-ENTMCNC: 32 % (ref 33–37)
MCV RBC AUTO: 90.9 FL (ref 79.4–94.8)
MONOCYTES # BLD: 0.7 K/UL (ref 0.2–0.8)
MONOCYTES NFR BLD: 11.9 %
NEUTROPHILS # BLD: 4.3 K/UL (ref 1.4–6.5)
NEUTS SEG NFR BLD: 71.6 %
PLATELET # BLD AUTO: 219 K/UL (ref 130–400)
POTASSIUM SERPL-SCNC: 4.5 MEQ/L (ref 3.4–4.9)
PROT SERPL-MCNC: 5.7 G/DL (ref 6.3–8)
RBC # BLD AUTO: 3.95 M/UL (ref 4.2–5.4)
SODIUM SERPL-SCNC: 139 MEQ/L (ref 135–144)
WBC # BLD AUTO: 6 K/UL (ref 4.8–10.8)

## 2024-09-23 PROCEDURE — 6370000000 HC RX 637 (ALT 250 FOR IP): Performed by: INTERNAL MEDICINE

## 2024-09-23 PROCEDURE — 80053 COMPREHEN METABOLIC PANEL: CPT

## 2024-09-23 PROCEDURE — 6370000000 HC RX 637 (ALT 250 FOR IP): Performed by: NURSE PRACTITIONER

## 2024-09-23 PROCEDURE — 6360000002 HC RX W HCPCS: Performed by: NURSE PRACTITIONER

## 2024-09-23 PROCEDURE — 1210000000 HC MED SURG R&B

## 2024-09-23 PROCEDURE — 2700000000 HC OXYGEN THERAPY PER DAY

## 2024-09-23 PROCEDURE — 99222 1ST HOSP IP/OBS MODERATE 55: CPT | Performed by: NURSE PRACTITIONER

## 2024-09-23 PROCEDURE — 6360000002 HC RX W HCPCS: Performed by: FAMILY MEDICINE

## 2024-09-23 PROCEDURE — 97163 PT EVAL HIGH COMPLEX 45 MIN: CPT

## 2024-09-23 PROCEDURE — 36415 COLL VENOUS BLD VENIPUNCTURE: CPT

## 2024-09-23 PROCEDURE — 2580000003 HC RX 258: Performed by: FAMILY MEDICINE

## 2024-09-23 PROCEDURE — 83735 ASSAY OF MAGNESIUM: CPT

## 2024-09-23 PROCEDURE — 97166 OT EVAL MOD COMPLEX 45 MIN: CPT

## 2024-09-23 PROCEDURE — 85025 COMPLETE CBC W/AUTO DIFF WBC: CPT

## 2024-09-23 PROCEDURE — 2580000003 HC RX 258: Performed by: NURSE PRACTITIONER

## 2024-09-23 RX ORDER — UREA 10 %
1000 LOTION (ML) TOPICAL DAILY
Status: DISCONTINUED | OUTPATIENT
Start: 2024-09-23 | End: 2024-09-25 | Stop reason: HOSPADM

## 2024-09-23 RX ORDER — ROSUVASTATIN CALCIUM 10 MG/1
10 TABLET, COATED ORAL NIGHTLY
Status: DISCONTINUED | OUTPATIENT
Start: 2024-09-23 | End: 2024-09-25 | Stop reason: HOSPADM

## 2024-09-23 RX ORDER — PANTOPRAZOLE SODIUM 40 MG/1
40 TABLET, DELAYED RELEASE ORAL
Status: DISCONTINUED | OUTPATIENT
Start: 2024-09-23 | End: 2024-09-25 | Stop reason: HOSPADM

## 2024-09-23 RX ORDER — FUROSEMIDE 40 MG
40 TABLET ORAL DAILY
Status: DISCONTINUED | OUTPATIENT
Start: 2024-09-23 | End: 2024-09-25 | Stop reason: HOSPADM

## 2024-09-23 RX ORDER — LEVOTHYROXINE SODIUM 50 UG/1
50 TABLET ORAL
Status: DISCONTINUED | OUTPATIENT
Start: 2024-09-23 | End: 2024-09-25 | Stop reason: HOSPADM

## 2024-09-23 RX ORDER — GABAPENTIN 100 MG/1
100 CAPSULE ORAL 2 TIMES DAILY
Status: DISCONTINUED | OUTPATIENT
Start: 2024-09-23 | End: 2024-09-25 | Stop reason: HOSPADM

## 2024-09-23 RX ORDER — VALSARTAN 160 MG/1
320 TABLET ORAL NIGHTLY
Status: DISCONTINUED | OUTPATIENT
Start: 2024-09-23 | End: 2024-09-25 | Stop reason: HOSPADM

## 2024-09-23 RX ORDER — ASPIRIN 81 MG/1
81 TABLET ORAL DAILY
Status: DISCONTINUED | OUTPATIENT
Start: 2024-09-23 | End: 2024-09-25 | Stop reason: HOSPADM

## 2024-09-23 RX ORDER — FLUCONAZOLE 100 MG/1
200 TABLET ORAL DAILY
Status: COMPLETED | OUTPATIENT
Start: 2024-09-23 | End: 2024-09-25

## 2024-09-23 RX ADMIN — ACETAMINOPHEN 325MG 650 MG: 325 TABLET ORAL at 08:42

## 2024-09-23 RX ADMIN — MICONAZOLE NITRATE: 2 POWDER TOPICAL at 20:40

## 2024-09-23 RX ADMIN — SERTRALINE 50 MG: 50 TABLET, FILM COATED ORAL at 20:39

## 2024-09-23 RX ADMIN — FLUCONAZOLE 200 MG: 100 TABLET ORAL at 08:42

## 2024-09-23 RX ADMIN — FUROSEMIDE 40 MG: 40 TABLET ORAL at 08:39

## 2024-09-23 RX ADMIN — ROSUVASTATIN CALCIUM 10 MG: 10 TABLET, FILM COATED ORAL at 20:39

## 2024-09-23 RX ADMIN — CEFTRIAXONE SODIUM 1000 MG: 1 INJECTION, POWDER, FOR SOLUTION INTRAMUSCULAR; INTRAVENOUS at 19:47

## 2024-09-23 RX ADMIN — ASPIRIN 81 MG: 81 TABLET, COATED ORAL at 08:39

## 2024-09-23 RX ADMIN — GABAPENTIN 100 MG: 100 CAPSULE ORAL at 08:39

## 2024-09-23 RX ADMIN — GABAPENTIN 100 MG: 100 CAPSULE ORAL at 20:39

## 2024-09-23 RX ADMIN — MICONAZOLE NITRATE: 2 POWDER TOPICAL at 08:42

## 2024-09-23 RX ADMIN — PANTOPRAZOLE SODIUM 40 MG: 40 TABLET, DELAYED RELEASE ORAL at 08:39

## 2024-09-23 RX ADMIN — ENOXAPARIN SODIUM 40 MG: 100 INJECTION SUBCUTANEOUS at 08:39

## 2024-09-23 RX ADMIN — LEVOTHYROXINE SODIUM 50 MCG: 0.05 TABLET ORAL at 08:39

## 2024-09-23 RX ADMIN — SODIUM CHLORIDE, PRESERVATIVE FREE 5 ML: 5 INJECTION INTRAVENOUS at 08:39

## 2024-09-23 RX ADMIN — VALSARTAN 320 MG: 160 TABLET ORAL at 20:39

## 2024-09-23 RX ADMIN — CYANOCOBALAMIN TAB 500 MCG 1000 MCG: 500 TAB at 08:39

## 2024-09-23 RX ADMIN — SODIUM CHLORIDE, PRESERVATIVE FREE 10 ML: 5 INJECTION INTRAVENOUS at 20:40

## 2024-09-23 ASSESSMENT — PAIN DESCRIPTION - LOCATION: LOCATION: HIP

## 2024-09-23 ASSESSMENT — PAIN SCALES - GENERAL
PAINLEVEL_OUTOF10: 2
PAINLEVEL_OUTOF10: 3

## 2024-09-23 ASSESSMENT — ENCOUNTER SYMPTOMS: RESPIRATORY NEGATIVE: 1

## 2024-09-23 ASSESSMENT — PAIN DESCRIPTION - ORIENTATION: ORIENTATION: LEFT

## 2024-09-23 ASSESSMENT — PAIN DESCRIPTION - DESCRIPTORS: DESCRIPTORS: ACHING

## 2024-09-24 LAB
ALBUMIN SERPL-MCNC: 3 G/DL (ref 3.5–4.6)
ALP SERPL-CCNC: 78 U/L (ref 40–130)
ALT SERPL-CCNC: 10 U/L (ref 0–33)
ANION GAP SERPL CALCULATED.3IONS-SCNC: 9 MEQ/L (ref 9–15)
AST SERPL-CCNC: 13 U/L (ref 0–35)
BASOPHILS # BLD: 0 K/UL (ref 0–0.2)
BASOPHILS NFR BLD: 0.8 %
BILIRUB SERPL-MCNC: 0.3 MG/DL (ref 0.2–0.7)
BUN SERPL-MCNC: 14 MG/DL (ref 8–23)
CALCIUM SERPL-MCNC: 8.6 MG/DL (ref 8.5–9.9)
CHLORIDE SERPL-SCNC: 100 MEQ/L (ref 95–107)
CO2 SERPL-SCNC: 30 MEQ/L (ref 20–31)
CREAT SERPL-MCNC: 0.58 MG/DL (ref 0.5–0.9)
EKG ATRIAL RATE: 76 BPM
EKG Q-T INTERVAL: 420 MS
EKG QRS DURATION: 154 MS
EKG QTC CALCULATION (BAZETT): 487 MS
EKG R AXIS: -68 DEGREES
EKG T AXIS: 45 DEGREES
EKG VENTRICULAR RATE: 81 BPM
EOSINOPHIL # BLD: 0.2 K/UL (ref 0–0.7)
EOSINOPHIL NFR BLD: 4.7 %
ERYTHROCYTE [DISTWIDTH] IN BLOOD BY AUTOMATED COUNT: 13.9 % (ref 11.5–14.5)
GLOBULIN SER CALC-MCNC: 3.1 G/DL (ref 2.3–3.5)
GLUCOSE BLD-MCNC: 99 MG/DL (ref 70–99)
GLUCOSE SERPL-MCNC: 101 MG/DL (ref 70–99)
HCT VFR BLD AUTO: 37.2 % (ref 37–47)
HGB BLD-MCNC: 12 G/DL (ref 12–16)
LYMPHOCYTES # BLD: 0.8 K/UL (ref 1–4.8)
LYMPHOCYTES NFR BLD: 16.1 %
MAGNESIUM SERPL-MCNC: 1.8 MG/DL (ref 1.7–2.4)
MCH RBC QN AUTO: 29.3 PG (ref 27–31.3)
MCHC RBC AUTO-ENTMCNC: 32.3 % (ref 33–37)
MCV RBC AUTO: 91 FL (ref 79.4–94.8)
MONOCYTES # BLD: 0.5 K/UL (ref 0.2–0.8)
MONOCYTES NFR BLD: 11.4 %
NEUTROPHILS # BLD: 3.1 K/UL (ref 1.4–6.5)
NEUTS SEG NFR BLD: 66.4 %
PERFORMED ON: NORMAL
PLATELET # BLD AUTO: 236 K/UL (ref 130–400)
POTASSIUM SERPL-SCNC: 4.3 MEQ/L (ref 3.4–4.9)
PROT SERPL-MCNC: 6.1 G/DL (ref 6.3–8)
RBC # BLD AUTO: 4.09 M/UL (ref 4.2–5.4)
SODIUM SERPL-SCNC: 139 MEQ/L (ref 135–144)
WBC # BLD AUTO: 4.7 K/UL (ref 4.8–10.8)

## 2024-09-24 PROCEDURE — 85025 COMPLETE CBC W/AUTO DIFF WBC: CPT

## 2024-09-24 PROCEDURE — 2580000003 HC RX 258: Performed by: NURSE PRACTITIONER

## 2024-09-24 PROCEDURE — 1210000000 HC MED SURG R&B

## 2024-09-24 PROCEDURE — 6370000000 HC RX 637 (ALT 250 FOR IP): Performed by: NURSE PRACTITIONER

## 2024-09-24 PROCEDURE — 2700000000 HC OXYGEN THERAPY PER DAY

## 2024-09-24 PROCEDURE — 36415 COLL VENOUS BLD VENIPUNCTURE: CPT

## 2024-09-24 PROCEDURE — 83735 ASSAY OF MAGNESIUM: CPT

## 2024-09-24 PROCEDURE — 97535 SELF CARE MNGMENT TRAINING: CPT

## 2024-09-24 PROCEDURE — 6370000000 HC RX 637 (ALT 250 FOR IP): Performed by: INTERNAL MEDICINE

## 2024-09-24 PROCEDURE — 6360000002 HC RX W HCPCS: Performed by: NURSE PRACTITIONER

## 2024-09-24 PROCEDURE — 80053 COMPREHEN METABOLIC PANEL: CPT

## 2024-09-24 RX ORDER — CEPHALEXIN 500 MG/1
500 CAPSULE ORAL EVERY 12 HOURS SCHEDULED
Status: DISCONTINUED | OUTPATIENT
Start: 2024-09-24 | End: 2024-09-25 | Stop reason: HOSPADM

## 2024-09-24 RX ADMIN — GABAPENTIN 100 MG: 100 CAPSULE ORAL at 09:16

## 2024-09-24 RX ADMIN — SERTRALINE 50 MG: 50 TABLET, FILM COATED ORAL at 20:05

## 2024-09-24 RX ADMIN — ACETAMINOPHEN 325MG 650 MG: 325 TABLET ORAL at 09:16

## 2024-09-24 RX ADMIN — SODIUM CHLORIDE, PRESERVATIVE FREE 10 ML: 5 INJECTION INTRAVENOUS at 20:05

## 2024-09-24 RX ADMIN — MICONAZOLE NITRATE: 2 POWDER TOPICAL at 20:06

## 2024-09-24 RX ADMIN — CYANOCOBALAMIN TAB 500 MCG 1000 MCG: 500 TAB at 09:16

## 2024-09-24 RX ADMIN — CEPHALEXIN 500 MG: 500 CAPSULE ORAL at 09:16

## 2024-09-24 RX ADMIN — ROSUVASTATIN CALCIUM 10 MG: 10 TABLET, FILM COATED ORAL at 20:05

## 2024-09-24 RX ADMIN — SODIUM CHLORIDE, PRESERVATIVE FREE 10 ML: 5 INJECTION INTRAVENOUS at 09:17

## 2024-09-24 RX ADMIN — ASPIRIN 81 MG: 81 TABLET, COATED ORAL at 09:16

## 2024-09-24 RX ADMIN — CEPHALEXIN 500 MG: 500 CAPSULE ORAL at 20:05

## 2024-09-24 RX ADMIN — PANTOPRAZOLE SODIUM 40 MG: 40 TABLET, DELAYED RELEASE ORAL at 06:07

## 2024-09-24 RX ADMIN — FLUCONAZOLE 200 MG: 100 TABLET ORAL at 09:16

## 2024-09-24 RX ADMIN — ENOXAPARIN SODIUM 40 MG: 100 INJECTION SUBCUTANEOUS at 09:16

## 2024-09-24 RX ADMIN — LEVOTHYROXINE SODIUM 50 MCG: 0.05 TABLET ORAL at 06:07

## 2024-09-24 RX ADMIN — MICONAZOLE NITRATE: 2 POWDER TOPICAL at 09:16

## 2024-09-24 RX ADMIN — FUROSEMIDE 40 MG: 40 TABLET ORAL at 09:16

## 2024-09-24 RX ADMIN — VALSARTAN 320 MG: 160 TABLET ORAL at 20:05

## 2024-09-24 RX ADMIN — GABAPENTIN 100 MG: 100 CAPSULE ORAL at 20:05

## 2024-09-24 ASSESSMENT — PAIN SCALES - GENERAL
PAINLEVEL_OUTOF10: 0
PAINLEVEL_OUTOF10: 2

## 2024-09-25 VITALS
TEMPERATURE: 97.4 F | SYSTOLIC BLOOD PRESSURE: 132 MMHG | WEIGHT: 169.2 LBS | HEIGHT: 65 IN | DIASTOLIC BLOOD PRESSURE: 78 MMHG | HEART RATE: 70 BPM | RESPIRATION RATE: 18 BRPM | OXYGEN SATURATION: 96 % | BODY MASS INDEX: 28.19 KG/M2

## 2024-09-25 LAB
ALBUMIN SERPL-MCNC: 3.2 G/DL (ref 3.5–4.6)
ALP SERPL-CCNC: 81 U/L (ref 40–130)
ALT SERPL-CCNC: 11 U/L (ref 0–33)
ANION GAP SERPL CALCULATED.3IONS-SCNC: 6 MEQ/L (ref 9–15)
AST SERPL-CCNC: 15 U/L (ref 0–35)
BASOPHILS # BLD: 0.1 K/UL (ref 0–0.2)
BASOPHILS NFR BLD: 1 %
BILIRUB SERPL-MCNC: 0.3 MG/DL (ref 0.2–0.7)
BUN SERPL-MCNC: 12 MG/DL (ref 8–23)
CALCIUM SERPL-MCNC: 8.9 MG/DL (ref 8.5–9.9)
CHLORIDE SERPL-SCNC: 98 MEQ/L (ref 95–107)
CO2 SERPL-SCNC: 34 MEQ/L (ref 20–31)
CREAT SERPL-MCNC: 0.54 MG/DL (ref 0.5–0.9)
EOSINOPHIL # BLD: 0.2 K/UL (ref 0–0.7)
EOSINOPHIL NFR BLD: 3.9 %
ERYTHROCYTE [DISTWIDTH] IN BLOOD BY AUTOMATED COUNT: 13.9 % (ref 11.5–14.5)
GLOBULIN SER CALC-MCNC: 3 G/DL (ref 2.3–3.5)
GLUCOSE SERPL-MCNC: 105 MG/DL (ref 70–99)
HCT VFR BLD AUTO: 39.7 % (ref 37–47)
HGB BLD-MCNC: 12.6 G/DL (ref 12–16)
LYMPHOCYTES # BLD: 0.7 K/UL (ref 1–4.8)
LYMPHOCYTES NFR BLD: 14.2 %
MAGNESIUM SERPL-MCNC: 1.8 MG/DL (ref 1.7–2.4)
MCH RBC QN AUTO: 28.7 PG (ref 27–31.3)
MCHC RBC AUTO-ENTMCNC: 31.7 % (ref 33–37)
MCV RBC AUTO: 90.4 FL (ref 79.4–94.8)
MONOCYTES # BLD: 0.6 K/UL (ref 0.2–0.8)
MONOCYTES NFR BLD: 11.8 %
NEUTROPHILS # BLD: 3.3 K/UL (ref 1.4–6.5)
NEUTS SEG NFR BLD: 68.5 %
PLATELET # BLD AUTO: 242 K/UL (ref 130–400)
POTASSIUM SERPL-SCNC: 4.3 MEQ/L (ref 3.4–4.9)
PROT SERPL-MCNC: 6.2 G/DL (ref 6.3–8)
RBC # BLD AUTO: 4.39 M/UL (ref 4.2–5.4)
SODIUM SERPL-SCNC: 138 MEQ/L (ref 135–144)
WBC # BLD AUTO: 4.9 K/UL (ref 4.8–10.8)

## 2024-09-25 PROCEDURE — 80053 COMPREHEN METABOLIC PANEL: CPT

## 2024-09-25 PROCEDURE — 2580000003 HC RX 258: Performed by: NURSE PRACTITIONER

## 2024-09-25 PROCEDURE — 6360000002 HC RX W HCPCS: Performed by: NURSE PRACTITIONER

## 2024-09-25 PROCEDURE — 83735 ASSAY OF MAGNESIUM: CPT

## 2024-09-25 PROCEDURE — 85025 COMPLETE CBC W/AUTO DIFF WBC: CPT

## 2024-09-25 PROCEDURE — 36415 COLL VENOUS BLD VENIPUNCTURE: CPT

## 2024-09-25 PROCEDURE — 2700000000 HC OXYGEN THERAPY PER DAY

## 2024-09-25 PROCEDURE — 6370000000 HC RX 637 (ALT 250 FOR IP): Performed by: INTERNAL MEDICINE

## 2024-09-25 RX ORDER — FLUCONAZOLE 200 MG/1
200 TABLET ORAL DAILY
DISCHARGE
Start: 2024-09-25 | End: 2024-09-26

## 2024-09-25 RX ORDER — CEPHALEXIN 500 MG/1
500 CAPSULE ORAL EVERY 12 HOURS SCHEDULED
DISCHARGE
Start: 2024-09-25

## 2024-09-25 RX ADMIN — GABAPENTIN 100 MG: 100 CAPSULE ORAL at 09:22

## 2024-09-25 RX ADMIN — CEPHALEXIN 500 MG: 500 CAPSULE ORAL at 09:50

## 2024-09-25 RX ADMIN — FLUCONAZOLE 200 MG: 100 TABLET ORAL at 09:22

## 2024-09-25 RX ADMIN — CYANOCOBALAMIN TAB 500 MCG 1000 MCG: 500 TAB at 09:22

## 2024-09-25 RX ADMIN — PANTOPRAZOLE SODIUM 40 MG: 40 TABLET, DELAYED RELEASE ORAL at 06:17

## 2024-09-25 RX ADMIN — MICONAZOLE NITRATE: 2 POWDER TOPICAL at 09:21

## 2024-09-25 RX ADMIN — SODIUM CHLORIDE, PRESERVATIVE FREE 10 ML: 5 INJECTION INTRAVENOUS at 09:24

## 2024-09-25 RX ADMIN — ASPIRIN 81 MG: 81 TABLET, COATED ORAL at 09:22

## 2024-09-25 RX ADMIN — LEVOTHYROXINE SODIUM 50 MCG: 0.05 TABLET ORAL at 06:17

## 2024-09-25 RX ADMIN — FUROSEMIDE 40 MG: 40 TABLET ORAL at 09:22

## 2024-09-25 RX ADMIN — ENOXAPARIN SODIUM 40 MG: 100 INJECTION SUBCUTANEOUS at 09:23

## 2024-09-25 ASSESSMENT — PAIN DESCRIPTION - FREQUENCY
FREQUENCY: CONTINUOUS
FREQUENCY: INTERMITTENT

## 2024-09-25 ASSESSMENT — PAIN DESCRIPTION - LOCATION: LOCATION: ANKLE;FOOT

## 2024-09-25 ASSESSMENT — PAIN SCALES - GENERAL
PAINLEVEL_OUTOF10: 3
PAINLEVEL_OUTOF10: 4
PAINLEVEL_OUTOF10: 0

## 2024-09-25 ASSESSMENT — PAIN DESCRIPTION - DESCRIPTORS
DESCRIPTORS: ACHING;SHARP
DESCRIPTORS: ACHING;DISCOMFORT;SORE;THROBBING

## 2024-09-26 ENCOUNTER — OFFICE VISIT (OUTPATIENT)
Dept: GERIATRIC MEDICINE | Age: 89
End: 2024-09-26

## 2024-09-26 DIAGNOSIS — I73.9 PAD (PERIPHERAL ARTERY DISEASE) (HCC): ICD-10-CM

## 2024-09-26 DIAGNOSIS — J42 CHRONIC BRONCHITIS, UNSPECIFIED CHRONIC BRONCHITIS TYPE (HCC): Primary | ICD-10-CM

## 2024-09-26 DIAGNOSIS — I10 ESSENTIAL HYPERTENSION: ICD-10-CM

## 2024-10-08 ENCOUNTER — OFFICE VISIT (OUTPATIENT)
Dept: PALLATIVE CARE | Age: 89
End: 2024-10-08
Payer: MEDICARE

## 2024-10-08 VITALS
HEART RATE: 83 BPM | DIASTOLIC BLOOD PRESSURE: 72 MMHG | TEMPERATURE: 98.8 F | WEIGHT: 156.5 LBS | OXYGEN SATURATION: 97 % | SYSTOLIC BLOOD PRESSURE: 118 MMHG | BODY MASS INDEX: 26.07 KG/M2

## 2024-10-08 DIAGNOSIS — Z51.5 PALLIATIVE CARE ENCOUNTER: ICD-10-CM

## 2024-10-08 DIAGNOSIS — R53.1 WEAKNESS: ICD-10-CM

## 2024-10-08 DIAGNOSIS — J44.9 CHRONIC OBSTRUCTIVE PULMONARY DISEASE, UNSPECIFIED COPD TYPE (HCC): Primary | ICD-10-CM

## 2024-10-08 PROCEDURE — 99309 SBSQ NF CARE MODERATE MDM 30: CPT

## 2024-10-08 PROCEDURE — G8484 FLU IMMUNIZE NO ADMIN: HCPCS

## 2024-10-08 PROCEDURE — 1123F ACP DISCUSS/DSCN MKR DOCD: CPT

## 2024-10-08 RX ORDER — SENNOSIDES 8.6 MG
650 CAPSULE ORAL EVERY 8 HOURS PRN
COMMUNITY

## 2024-10-08 RX ORDER — MAGNESIUM HYDROXIDE/ALUMINUM HYDROXICE/SIMETHICONE 120; 1200; 1200 MG/30ML; MG/30ML; MG/30ML
30 SUSPENSION ORAL EVERY 4 HOURS PRN
COMMUNITY

## 2024-10-08 RX ORDER — ACETAMINOPHEN 325 MG/1
650 TABLET ORAL EVERY 4 HOURS PRN
COMMUNITY

## 2024-10-08 RX ORDER — GUAIFENESIN/DEXTROMETHORPHAN 100-10MG/5
10 SYRUP ORAL EVERY 4 HOURS PRN
COMMUNITY

## 2024-10-08 RX ORDER — BISACODYL 10 MG
10 SUPPOSITORY, RECTAL RECTAL DAILY PRN
COMMUNITY

## 2024-10-08 RX ORDER — NICOTINE POLACRILEX 4 MG
15 LOZENGE BUCCAL SEE ADMIN INSTRUCTIONS
COMMUNITY

## 2024-10-08 NOTE — PROGRESS NOTES
remains to be intermittently incontinent of urine per conversation with staff and patient.    I have personally reviewed the patient's most recent and available provider notes, lab work and imaging.      Past Medical History:   Diagnosis Date    BPV (benign positional vertigo) 2019    CAD (coronary artery disease)     Dr York    COPD (chronic obstructive pulmonary disease) ([facility]) 2012, VQ scan and CXR    has supplemental oxygen, uses it for 4-6 hours nightly, no inhalers use    Diverticulosis     Hiatal hernia     Hiatal hernia with gastroesophageal reflux 2015    EGD Dr Singh, large     History of blood transfusion     History of colonoscopy with polypectomy 2017    Dr Singh    History of sleep apnea 2011    no CPAP use    Hypertension     Hypothyroidism     Mixed hyperlipidemia 3/27/2021    Nocturnal hypoxia 2011    nocturnal O2, 3 LNC, 4-6 hours nightly    Obesity (BMI 30-39.9)     Osteoarthritis of multiple joints     Oxygen dependent 2019    Postnasal drip     Septic bursitis of elbow, right 9/26/2021    Sigmoid diverticulosis     Weakness of both legs      Past Surgical History:   Procedure Laterality Date    HYSTERECTOMY (CERVIX STATUS UNKNOWN)      fibroid, bleeding    JOINT REPLACEMENT      DE COLON CA SCRN NOT HI RSK IND N/A 9/1/2017    [place] [facility] [place]    DE ESOPHAGOGASTRODUODENOSCOPY TRANSORAL DIAGNOSTIC N/A 9/1/2017    EGD ESOPHAGOGASTRODUODENOSCOPY [place] [facility] [place]    TONSILLECTOMY      TOTAL HIP ARTHROPLASTY Right 04/03/2012    TOTAL KNEE ARTHROPLASTY Bilateral     left x 2, right x1 (Dr Morin)    UPPER GASTROINTESTINAL ENDOSCOPY  12/29/15    Dr. Singh     Social History     Socioeconomic History    Marital status:      Spouse name: Not on file    Number of children: 7    Years of education: Not on file    Highest education level: Not on file   Occupational History    Occupation: homemaker and , retired

## 2024-10-12 DIAGNOSIS — Z76.0 MEDICATION REFILL: ICD-10-CM

## 2024-10-14 RX ORDER — ROSUVASTATIN CALCIUM 10 MG/1
TABLET, COATED ORAL
Qty: 90 TABLET | Refills: 1 | Status: SHIPPED | OUTPATIENT
Start: 2024-10-14

## 2024-10-14 NOTE — TELEPHONE ENCOUNTER
Comments:     Last Office Visit (last PCP visit):   1/8/2024    Next Visit Date:  Future Appointments   Date Time Provider Department Center   12/4/2024 11:30 AM Arthur Mcknight DPM [facility] OP P[facility]   12/17/2024 11:00 AM Sam Alva APRN - CNP PC MOB PHYS [facility]       **If hasn't been seen in over a year OR hasn't followed up according to last diabetes/ADHD visit, make appointment for patient before sending refill to provider.    Rx requested:  Requested Prescriptions     Pending Prescriptions Disp Refills    sertraline (ZOLOFT) 50 MG tablet [Pharmacy Med Name: sertraline 50 mg tablet] 90 tablet 1     Sig: TAKE 1 TABLET BY MOUTH EVERY EVENING    rosuvastatin (CRESTOR) 10 MG tablet [Pharmacy Med Name: rosuvastatin 10 mg tablet] 90 tablet 1     Sig: TAKE 1 TABLET BY MOUTH EVERY DAY

## 2024-10-17 ENCOUNTER — OFFICE VISIT (OUTPATIENT)
Dept: GERIATRIC MEDICINE | Age: 89
End: 2024-10-17

## 2024-10-17 DIAGNOSIS — J42 CHRONIC BRONCHITIS, UNSPECIFIED CHRONIC BRONCHITIS TYPE (HCC): Primary | ICD-10-CM

## 2024-10-22 PROBLEM — N39.0 UTI (URINARY TRACT INFECTION): Status: RESOLVED | Noted: 2024-09-22 | Resolved: 2024-10-22

## 2024-10-25 NOTE — PROGRESS NOTES
name: Not on file    Number of children: 7    Years of education: Not on file    Highest education level: Not on file   Occupational History    Occupation: homemaker and , retired   Tobacco Use    Smoking status: Former    Smokeless tobacco: Never    Tobacco comments:     quit 50 years ago   Substance and Sexual Activity    Alcohol use: No    Drug use: No    Sexual activity: Not Currently   Other Topics Concern    Not on file   Social History Narrative    Born in [place] (grandparents came from [place], settled in [place])    ,  (X 2)    Lives in a house with two of her sons    7 children, 4 M, 3 F    Lost 58 yo son in Feb 2018 from lung cancer    Was  for the [employer]    Hobbies netting, [employer] cooking, housework     Social Determinants of Health     Financial Resource Strain: Low Risk  (4/4/2023)    Overall Financial Resource Strain (CARDIA)     Difficulty of Paying Living Expenses: Not hard at all   Food Insecurity: No Food Insecurity (9/21/2024)    Hunger Vital Sign     Worried About Running Out of Food in the Last Year: Never true     Ran Out of Food in the Last Year: Never true   Transportation Needs: No Transportation Needs (9/21/2024)    PRAPARE - Transportation     Lack of Transportation (Medical): No     Lack of Transportation (Non-Medical): No   Physical Activity: Inactive (1/8/2024)    Exercise Vital Sign     Days of Exercise per Week: 0 days     Minutes of Exercise per Session: 0 min   Stress: Not on file   Social Connections: Not on file   Intimate Partner Violence: Not on file   Housing Stability: Low Risk  (9/21/2024)    Housing Stability Vital Sign     Unable to Pay for Housing in the Last Year: No     Number of Times Moved in the Last Year: 1     Homeless in the Last Year: No         Lab Results   Component Value Date    LABA1C 5.8 08/08/2018     No results found for: \"EAG\"    Lab Results   Component Value Date/Time     09/25/2024 04:51 AM    K 4.3

## 2024-11-16 NOTE — PROGRESS NOTES
SUBJECTIVE:        ROS:  The rest of the 14 point ROS negative    PHYSICAL EXAM: VSS per facility record      ASSESSMENT & PLAN:   Diagnosis Orders   1. Chronic bronchitis, unspecified chronic bronchitis type (HCC)                      Past Medical History:   Diagnosis Date    BPV (benign positional vertigo) 2019    CAD (coronary artery disease)     Dr York    COPD (chronic obstructive pulmonary disease) (HCC) 2012, VQ scan and CXR    has supplemental oxygen, uses it for 4-6 hours nightly, no inhalers use    Diverticulosis     Hiatal hernia     Hiatal hernia with gastroesophageal reflux 2015    EGD Dr Singh, large HH    History of blood transfusion     History of colonoscopy with polypectomy 2017    Dr Singh    History of sleep apnea 2011    no CPAP use    Hypertension     Hypothyroidism     Mixed hyperlipidemia 3/27/2021    Nocturnal hypoxia 2011    nocturnal O2, 3 LNC, 4-6 hours nightly    Obesity (BMI 30-39.9)     Osteoarthritis of multiple joints     Oxygen dependent 2019    Postnasal drip     Septic bursitis of elbow, right 9/26/2021    Sigmoid diverticulosis     Weakness of both legs          Past Surgical History:   Procedure Laterality Date    HYSTERECTOMY (CERVIX STATUS UNKNOWN)      fibroid, bleeding    JOINT REPLACEMENT      HI COLON CA SCRN NOT HI RSK IND N/A 9/1/2017    COLONOSCOPY performed by Carleen Ospina MD at [facility] [place]    HI ESOPHAGOGASTRODUODENOSCOPY TRANSORAL DIAGNOSTIC N/A 9/1/2017    EGD ESOPHAGOGASTRODUODENOSCOPY performed by Carleen Ospina MD at [facility] [place]    TONSILLECTOMY      TOTAL HIP ARTHROPLASTY Right 04/03/2012    TOTAL KNEE ARTHROPLASTY Bilateral     left x 2, right x1 (Dr Morin)    UPPER GASTROINTESTINAL ENDOSCOPY  12/29/15    Dr. Singh         Current Outpatient Medications on File Prior to Visit   Medication Sig Dispense Refill    sertraline (ZOLOFT) 50 MG tablet TAKE 1 TABLET BY MOUTH EVERY EVENING 90 tablet 1    rosuvastatin (CRESTOR) 10 MG tablet TAKE 1 TABLET BY MOUTH EVERY

## 2024-11-22 ENCOUNTER — OFFICE VISIT (OUTPATIENT)
Dept: GERIATRIC MEDICINE | Age: 88
End: 2024-11-22

## 2024-11-22 DIAGNOSIS — J34.89 TENDERNESS OVER FRONTAL SINUS: Primary | ICD-10-CM

## 2024-12-17 ENCOUNTER — OFFICE VISIT (OUTPATIENT)
Dept: PALLATIVE CARE | Age: 88
End: 2024-12-17
Payer: MEDICARE

## 2024-12-17 VITALS
RESPIRATION RATE: 16 BRPM | HEART RATE: 73 BPM | TEMPERATURE: 99 F | BODY MASS INDEX: 26.66 KG/M2 | OXYGEN SATURATION: 98 % | WEIGHT: 160 LBS

## 2024-12-17 DIAGNOSIS — R09.81 NASAL CONGESTION: ICD-10-CM

## 2024-12-17 DIAGNOSIS — J44.9 CHRONIC OBSTRUCTIVE PULMONARY DISEASE, UNSPECIFIED COPD TYPE (HCC): ICD-10-CM

## 2024-12-17 DIAGNOSIS — L85.3 DRY SKIN: Primary | ICD-10-CM

## 2024-12-17 DIAGNOSIS — R53.1 WEAKNESS: ICD-10-CM

## 2024-12-17 DIAGNOSIS — Z51.5 PALLIATIVE CARE ENCOUNTER: ICD-10-CM

## 2024-12-17 PROCEDURE — 1123F ACP DISCUSS/DSCN MKR DOCD: CPT

## 2024-12-17 PROCEDURE — 99309 SBSQ NF CARE MODERATE MDM 30: CPT

## 2024-12-17 PROCEDURE — G8484 FLU IMMUNIZE NO ADMIN: HCPCS

## 2024-12-17 RX ORDER — AMMONIUM LACTATE 12 G/100G
LOTION TOPICAL 2 TIMES DAILY
Qty: 1 EACH | Refills: 0 | Status: SHIPPED | OUTPATIENT
Start: 2024-12-17

## 2024-12-17 RX ORDER — ECHINACEA PURPUREA EXTRACT 125 MG
1 TABLET ORAL PRN
Qty: 1 EACH | Refills: 3 | Status: SHIPPED | OUTPATIENT
Start: 2024-12-17

## 2024-12-17 RX ORDER — VALSARTAN 320 MG/1
320 TABLET ORAL DAILY
COMMUNITY
Start: 2024-12-10

## 2024-12-17 RX ORDER — CETIRIZINE HYDROCHLORIDE 10 MG/1
10 TABLET ORAL DAILY
COMMUNITY

## 2024-12-17 NOTE — PROGRESS NOTES
recently having evaluation by podiatry with toenail trimming.  Noted bilateral lower extremity dry skin and facial dry skin during interaction.  Patient agreeable to trial medicated lotion/plan to aid with dry skin.  -Relayed new order placement with nursing staff.  -Palliative care will continue to monitor.  - ammonium lactate (LAC-HYDRIN) 12 % lotion; Apply topically in the morning and at bedtime Apply topically to bilateral lower extremities and other dry areas twice daily and as needed. Patient may have at bedside if desired.  Dispense: 1 each; Refill: 0    2. Nasal congestion  3. Chronic obstructive pulmonary disease, unspecified COPD type (HCC)  -stable  -Plan: Ordered Ayr nasal saline needed spray as needed and patient may have at bedside, continue COPD directed therapies that are managed by another provider/speciality and continue to follow with primary care provider and pulmonologist as recommended/advised.   -Call for increased SOB, cough, sputum production, excessive fatigue, fever, chills, or myalgias.   -Activity as tolerated.   -DO NOT SMOKE WITH HOME OXYGEN as it can cause an explosion.   -Advised pacing, energy conservation measures including frequent rests; maintain adequate nutrition and hydration.   -Rinse out mouth after inhaler use.   -Palliative care will continue to monitor  - sodium chloride (OCEAN) 0.65 % nasal spray; 1 spray by Nasal route as needed for Congestion 1 spray each nare as needed. Patient may have at bedside.  Dispense: 1 each; Refill: 3    4. Weakness  -Ongoing  -Plan: Provided additional information and education on the benefits of continuing with recommended therapy and participating, encouraged the patient to perform exercises on her own/separately from therapy sessions, encouraged adequate oral intake, encouraged increased range of motion to prevent worsening arthralgia pain or worsening range of motion, adequate oral intake, take medications and follow with PCP and other

## 2024-12-24 ASSESSMENT — ENCOUNTER SYMPTOMS
STRIDOR: 0
COUGH: 1
WHEEZING: 0
SHORTNESS OF BREATH: 0

## 2025-01-09 DIAGNOSIS — Z76.0 MEDICATION REFILL: ICD-10-CM

## 2025-01-09 DIAGNOSIS — I10 ESSENTIAL HYPERTENSION: ICD-10-CM

## 2025-01-09 RX ORDER — VALSARTAN 160 MG/1
TABLET ORAL
Qty: 180 TABLET | Refills: 1 | Status: SHIPPED | OUTPATIENT
Start: 2025-01-09

## 2025-01-09 RX ORDER — LEVOTHYROXINE SODIUM 50 UG/1
TABLET ORAL
Qty: 90 TABLET | Refills: 1 | Status: SHIPPED | OUTPATIENT
Start: 2025-01-09

## 2025-01-09 RX ORDER — PANTOPRAZOLE SODIUM 40 MG/1
TABLET, DELAYED RELEASE ORAL
Qty: 90 TABLET | Refills: 1 | Status: SHIPPED | OUTPATIENT
Start: 2025-01-09

## 2025-01-09 NOTE — TELEPHONE ENCOUNTER
Comments:     Last Office Visit (last PCP visit):   1/8/2024    Next Visit Date:  Future Appointments   Date Time Provider Department Center   2/18/2025 12:30 PM Bursley, Sam, APRN - CNP PC [facility] PHYS [facility]       **If hasn't been seen in over a year OR hasn't followed up according to last diabetes/ADHD visit, make appointment for patient before sending refill to provider.    Rx requested:  Requested Prescriptions     Pending Prescriptions Disp Refills    pantoprazole (PROTONIX) 40 MG tablet [Pharmacy Med Name: pantoprazole 40 mg tablet,delayed release] 90 tablet 1     Sig: TAKE 1 TABLET BY MOUTH DAILY AS NEEDED for heartburn    levothyroxine (SYNTHROID) 50 MCG tablet [Pharmacy Med Name: levothyroxine 50 mcg tablet] 90 tablet 1     Sig: TAKE 1 TABLET BY MOUTH EVERY MORNING before breakfast    valsartan (DIOVAN) 160 MG tablet [Pharmacy Med Name: valsartan 160 mg tablet] 180 tablet 1     Sig: TAKE 2 TABLETS BY MOUTH NIGHTLY

## 2025-02-10 ENCOUNTER — OFFICE VISIT (OUTPATIENT)
Dept: GERIATRIC MEDICINE | Age: 89
End: 2025-02-10

## 2025-02-10 DIAGNOSIS — J42 CHRONIC BRONCHITIS, UNSPECIFIED CHRONIC BRONCHITIS TYPE (HCC): ICD-10-CM

## 2025-02-10 DIAGNOSIS — I10 ESSENTIAL HYPERTENSION: Primary | ICD-10-CM

## 2025-02-10 DIAGNOSIS — M15.0 PRIMARY OSTEOARTHRITIS INVOLVING MULTIPLE JOINTS: ICD-10-CM

## 2025-02-11 ENCOUNTER — OFFICE VISIT (OUTPATIENT)
Dept: GERIATRIC MEDICINE | Age: 89
End: 2025-02-11

## 2025-02-11 DIAGNOSIS — J42 CHRONIC BRONCHITIS, UNSPECIFIED CHRONIC BRONCHITIS TYPE (HCC): ICD-10-CM

## 2025-02-11 DIAGNOSIS — I48.0 PAROXYSMAL ATRIAL FIBRILLATION (HCC): Primary | ICD-10-CM

## 2025-02-11 DIAGNOSIS — I10 ESSENTIAL HYPERTENSION: ICD-10-CM

## 2025-02-17 NOTE — PROGRESS NOTES
Subjective:      Patient Id: Seen Dennise at [facility], for follow-up palliative Care visit.  She was accompanied to the appointment by: self.    Chief Complaint   Patient presents with    Ear Fullness    Follow-up      HPI       Dennise Sosa is a 89 y.o. female was seen and evaluated by palliative care for symptom management related to chronic bronchitis/COPD. Dennise has complex medical history that includes vertigo, diverticulosis, hiatal hernia, HTN, hypothyroidism, HLD, DHARA, obesity, OA, weakness. Nursing home visit is necessary in lieu of office due to significant frailty and high symptom burden from comorbid illnesses.     Patient complaint of ear fullness, [place] and sinus pressure more on the left side.  Patient follows with PCP.    General: Upon entering the room I find the patient at baseline, sitting up in tilt in space wheelchair, calm and cooperative. Patient remains at [facility] long-term placement as she is not able to care for herself in the outpatient setting.  Reinforced previous information and continue to provide encouragement for new living situation with potential increased quality of life, friendship with and access to care.    Ear fullness: Patient with ongoing ear fullness, [place] & sinus pressure (worse on the left side of her face). Patient has been taking cough medication that helps and has intermittent ear popping. After her ear pops she can hear better but then her fullness returns and has decreased hearing that has been occurring for the past 3 weeks.     Skin: Patient and nursing staff deny any current open skin areas, wounds or delayed skin healing during interaction.    COPD: Patient continues to utilize supplemental oxygen at [facility] and throughout appointment.  Patient stated that her nose feels dry and of nasal congestion during interaction.  Reviewed current scheduled and as needed medications during interaction with the

## 2025-02-18 ENCOUNTER — OFFICE VISIT (OUTPATIENT)
Dept: PALLATIVE CARE | Age: 89
End: 2025-02-18
Payer: MEDICARE

## 2025-02-18 VITALS
BODY MASS INDEX: 27.16 KG/M2 | DIASTOLIC BLOOD PRESSURE: 68 MMHG | RESPIRATION RATE: 16 BRPM | HEART RATE: 57 BPM | TEMPERATURE: 98.8 F | WEIGHT: 163 LBS | OXYGEN SATURATION: 98 % | SYSTOLIC BLOOD PRESSURE: 110 MMHG

## 2025-02-18 DIAGNOSIS — Z51.5 PALLIATIVE CARE ENCOUNTER: ICD-10-CM

## 2025-02-18 DIAGNOSIS — H93.8X3 SENSATION OF FULLNESS IN BOTH EARS: Primary | ICD-10-CM

## 2025-02-18 DIAGNOSIS — J44.9 CHRONIC OBSTRUCTIVE PULMONARY DISEASE, UNSPECIFIED COPD TYPE (HCC): ICD-10-CM

## 2025-02-18 DIAGNOSIS — J34.89 SINUS PRESSURE: ICD-10-CM

## 2025-02-18 DIAGNOSIS — H91.93 DECREASED HEARING OF BOTH EARS: ICD-10-CM

## 2025-02-18 PROCEDURE — 99310 SBSQ NF CARE HIGH MDM 45: CPT

## 2025-02-18 PROCEDURE — 1123F ACP DISCUSS/DSCN MKR DOCD: CPT

## 2025-02-18 RX ORDER — OXYMETAZOLINE HYDROCHLORIDE 0.05 G/100ML
1 SPRAY NASAL 2 TIMES DAILY
Qty: 1 EACH | Refills: 0 | Status: SHIPPED | OUTPATIENT
Start: 2025-02-18 | End: 2025-02-21

## 2025-02-18 RX ORDER — ASPIRIN 81 MG
5 TABLET, DELAYED RELEASE (ENTERIC COATED) ORAL 2 TIMES DAILY
Qty: 15 ML | Refills: 0 | Status: SHIPPED | OUTPATIENT
Start: 2025-02-18 | End: 2025-03-11

## 2025-02-18 ASSESSMENT — PATIENT HEALTH QUESTIONNAIRE - PHQ9
SUM OF ALL RESPONSES TO PHQ QUESTIONS 1-9: 0
SUM OF ALL RESPONSES TO PHQ QUESTIONS 1-9: 0
1. LITTLE INTEREST OR PLEASURE IN DOING THINGS: NOT AT ALL
SUM OF ALL RESPONSES TO PHQ QUESTIONS 1-9: 0
SUM OF ALL RESPONSES TO PHQ QUESTIONS 1-9: 0
SUM OF ALL RESPONSES TO PHQ9 QUESTIONS 1 & 2: 0
2. FEELING DOWN, DEPRESSED OR HOPELESS: NOT AT ALL

## 2025-02-18 ASSESSMENT — ENCOUNTER SYMPTOMS
RESPIRATORY NEGATIVE: 1
SINUS PRESSURE: 1

## 2025-03-02 PROBLEM — I48.0 PAROXYSMAL ATRIAL FIBRILLATION (HCC): Status: ACTIVE | Noted: 2025-03-02

## 2025-03-06 ENCOUNTER — OFFICE VISIT (OUTPATIENT)
Age: 89
End: 2025-03-06
Payer: MEDICARE

## 2025-03-06 VITALS
RESPIRATION RATE: 15 BRPM | TEMPERATURE: 97.5 F | WEIGHT: 163 LBS | BODY MASS INDEX: 27.83 KG/M2 | HEART RATE: 68 BPM | HEIGHT: 64 IN | SYSTOLIC BLOOD PRESSURE: 100 MMHG | DIASTOLIC BLOOD PRESSURE: 75 MMHG | OXYGEN SATURATION: 99 %

## 2025-03-06 DIAGNOSIS — H61.23 BILATERAL IMPACTED CERUMEN: Primary | ICD-10-CM

## 2025-03-06 PROCEDURE — 1036F TOBACCO NON-USER: CPT | Performed by: STUDENT IN AN ORGANIZED HEALTH CARE EDUCATION/TRAINING PROGRAM

## 2025-03-06 PROCEDURE — G8427 DOCREV CUR MEDS BY ELIG CLIN: HCPCS | Performed by: STUDENT IN AN ORGANIZED HEALTH CARE EDUCATION/TRAINING PROGRAM

## 2025-03-06 PROCEDURE — G8417 CALC BMI ABV UP PARAM F/U: HCPCS | Performed by: STUDENT IN AN ORGANIZED HEALTH CARE EDUCATION/TRAINING PROGRAM

## 2025-03-06 PROCEDURE — 1123F ACP DISCUSS/DSCN MKR DOCD: CPT | Performed by: STUDENT IN AN ORGANIZED HEALTH CARE EDUCATION/TRAINING PROGRAM

## 2025-03-06 PROCEDURE — 1159F MED LIST DOCD IN RCRD: CPT | Performed by: STUDENT IN AN ORGANIZED HEALTH CARE EDUCATION/TRAINING PROGRAM

## 2025-03-06 PROCEDURE — 1160F RVW MEDS BY RX/DR IN RCRD: CPT | Performed by: STUDENT IN AN ORGANIZED HEALTH CARE EDUCATION/TRAINING PROGRAM

## 2025-03-06 PROCEDURE — 99202 OFFICE O/P NEW SF 15 MIN: CPT | Performed by: STUDENT IN AN ORGANIZED HEALTH CARE EDUCATION/TRAINING PROGRAM

## 2025-03-06 PROCEDURE — 1090F PRES/ABSN URINE INCON ASSESS: CPT | Performed by: STUDENT IN AN ORGANIZED HEALTH CARE EDUCATION/TRAINING PROGRAM

## 2025-03-06 PROCEDURE — 69210 REMOVE IMPACTED EAR WAX UNI: CPT | Performed by: STUDENT IN AN ORGANIZED HEALTH CARE EDUCATION/TRAINING PROGRAM

## 2025-03-06 NOTE — PROGRESS NOTES
[facility] PHYSICIANS [place] [facility], [facility] OTOLARYNGOLOGY  [address] [place] [address], [address]  [facility] [address]  Dept: [phone]  Dept Fax: [phone]  Loc: [phone]     3/6/2025    Visit type: New patient    Reason for Visit: New Patient (Sensation of fullness in both ears Palliative care encounter Decreased hearing of both ears/)       ASSESSMENT/PLAN   1. Bilateral impacted cerumen    No orders of the defined types were placed in this encounter.     Cerumen bilaterally cleaned today  Declined further hearing test   Follow up as needed    Subjective    Patient: Dennise Sosa is a 89 y.o. female   HPI:    Referred by: Sam Alva APRN -*      Decreased hearing bilaterally and slight pain   No recent hearing test   Has been going on for a few months    Allergies   Allergen Reactions    Ciprofloxacin Hcl     Lipitor [Atorvastatin]     Zetia [Ezetimibe]        Current Outpatient Medications:     carbamide peroxide (DEBROX) 6.5 % otic solution, Place 5 drops into both ears 2 times daily for 21 days, Disp: 15 mL, Rfl: 0    pantoprazole (PROTONIX) 40 MG tablet, TAKE 1 TABLET BY MOUTH DAILY AS NEEDED for heartburn, Disp: 90 tablet, Rfl: 1    levothyroxine (SYNTHROID) 50 MCG tablet, TAKE 1 TABLET BY MOUTH EVERY MORNING before breakfast, Disp: 90 tablet, Rfl: 1    valsartan (DIOVAN) 160 MG tablet, TAKE 2 TABLETS BY MOUTH NIGHTLY, Disp: 180 tablet, Rfl: 1    cetirizine (ZYRTEC) 10 MG tablet, Take 1 tablet by mouth daily, Disp: , Rfl:     valsartan (DIOVAN) 320 MG tablet, Take 1 tablet by mouth daily, Disp: , Rfl:     ammonium lactate (LAC-HYDRIN) 12 % lotion, Apply topically in the morning and at bedtime Apply topically to bilateral lower extremities and other dry areas twice daily and as needed. Patient may have at bedside if desired., Disp: 1 each, Rfl: 0    sodium chloride (OCEAN) 0.65 % nasal spray, 1 spray by Nasal route as needed for Congestion 1 spray each nare as needed.

## 2025-03-11 ASSESSMENT — ENCOUNTER SYMPTOMS
STRIDOR: 0
SHORTNESS OF BREATH: 0
WHEEZING: 0
COUGH: 1

## 2025-03-11 NOTE — PROGRESS NOTES
Subjective:      Patient ID: Dennise Sosa is a pleasant 89 y.o. female who presents today for:  Chief Complaint   Patient presents with    Other     Essential hypertension  (primary encounter diagnosis)  Chronic bronchitis, unspecified chronic bronchitis type (hcc)  Primary osteoarthritis involving multiple joints         [facility]    Pt seen today for monthly visit for chronic medical issues including COPD, OA, and HTN. VSS. No new acute sequelae. Some minor dyspnea on exertion only. Will consdier added nebs if needed. Pt no complaint for added pain. APAP effective. BP controlled. Will follow for further concerns. No new intervention currently.      Patient Active Problem List   Diagnosis    GERD (gastroesophageal reflux disease)    Diverticulosis    Postnasal drip    COPD (chronic obstructive pulmonary disease) ([facility])    Obesity (BMI 30-39.9)    Osteoarthritis of multiple joints    History of sleep apnea    Essential hypertension    Vitamin D deficiency    At high risk for falls    Benign paroxysmal positional vertigo    Coronary artery disease involving native coronary artery of native heart without angina pectoris    AV block, 1st degree    Dizziness    At high risk for injury related to fall    Weakness of both legs    Aortic ejection murmur    B12 deficiency    Chronic fatigue    Mixed hyperlipidemia    Hypothyroidism    Bilateral hip pain    Chronic pain of both knees    Pain in both lower extremities    Other specified symptoms and signs involving the circulatory and respiratory systems     PAD (peripheral artery disease)    Major depressive disorder, recurrent, mild    Ankle edema, bilateral    Urinary frequency    Intertriginous candidiasis    Irregular heart rate    Suspected urinary tract infection    Sprain of right ankle    Inability to ambulate due to ankle or foot    Weakness generalized    Palliative care encounter    Goals of care, counseling/discussion    Advanced care

## 2025-04-16 DIAGNOSIS — Z76.0 MEDICATION REFILL: ICD-10-CM

## 2025-04-17 RX ORDER — ROSUVASTATIN CALCIUM 10 MG/1
10 TABLET, COATED ORAL DAILY
Qty: 90 TABLET | Refills: 1 | Status: SHIPPED | OUTPATIENT
Start: 2025-04-17

## 2025-04-17 NOTE — TELEPHONE ENCOUNTER
Last Office Visit (last PCP visit):   1/8/2024    Next Visit Date:  Future Appointments   Date Time Provider Department Center   4/22/2025 12:30 PM Sam Alva APRN - CNP PC MOB PHYS [facility]

## 2025-04-17 NOTE — TELEPHONE ENCOUNTER
Phone number incorrect, spoke to patients daughter Modesta and got the phone number for Radha hannah to call and try to speak to the  edgar hannah phone: [phone]

## 2025-04-22 ENCOUNTER — OFFICE VISIT (OUTPATIENT)
Age: 89
End: 2025-04-22

## 2025-04-22 VITALS
HEART RATE: 83 BPM | SYSTOLIC BLOOD PRESSURE: 102 MMHG | OXYGEN SATURATION: 97 % | RESPIRATION RATE: 14 BRPM | DIASTOLIC BLOOD PRESSURE: 60 MMHG

## 2025-04-22 DIAGNOSIS — Z51.5 PALLIATIVE CARE ENCOUNTER: ICD-10-CM

## 2025-04-22 DIAGNOSIS — M15.0 PRIMARY OSTEOARTHRITIS INVOLVING MULTIPLE JOINTS: Primary | ICD-10-CM

## 2025-04-22 DIAGNOSIS — H91.90 HOH (HARD OF HEARING): ICD-10-CM

## 2025-04-22 DIAGNOSIS — J44.9 CHRONIC OBSTRUCTIVE PULMONARY DISEASE, UNSPECIFIED COPD TYPE (HCC): ICD-10-CM

## 2025-04-22 RX ORDER — HYDROCODONE BITARTRATE AND ACETAMINOPHEN 5; 325 MG/1; MG/1
1 TABLET ORAL 2 TIMES DAILY PRN
Qty: 42 TABLET | Refills: 0 | Status: SHIPPED | OUTPATIENT
Start: 2025-04-22 | End: 2025-05-22

## 2025-04-22 ASSESSMENT — ENCOUNTER SYMPTOMS: RESPIRATORY NEGATIVE: 1

## 2025-04-22 NOTE — PROGRESS NOTES
Subjective:      Patient Id: Seen Dennise at [facility], for follow-up palliative Care visit.  She was accompanied to the appointment by: self.    Chief Complaint   Patient presents with    COPD    Other     OA pain    Follow-up      HPI       Dennise Sosa is a 89 y.o. female was seen and evaluated by palliative care for symptom management related to chronic bronchitis/COPD. Dennise has complex medical history that includes vertigo, diverticulosis, hiatal hernia, HTN, hypothyroidism, HLD, DHARA, obesity, OA, weakness. Nursing home visit is necessary in lieu of office due to significant frailty and high symptom burden from comorbid illnesses.     Patient complaint of left knee pain and [place].  Patient follows with PCP.    General: Upon entering the room I find the patient at baseline, sitting up in tilt in space wheelchair with roommate and her friend at her side, calm and cooperative. Patient is now at [facility] for long-term care and adapting nicely during interaction.    Pain: Patient states her pain has been increasing and becoming unmanageable, currently in her left leg and not relieved with rest.  Patient states that most of her pain is in left leg and neck and made worse with ambulation/movement.  Patient's current regimen includes: Tylenol and Voltaren gel.    Ear fullness: Patient with complaint of ongoing [place] that was slightly improved after seeing ENT provider. Patient was able to see ENT with successful remove of bilateral cerumen impaction per EMR documentation.    Skin: Patient and nursing staff deny any current open skin areas, wounds or delayed skin healing during interaction.    COPD: Patient currently at baseline during interaction.  Denies SOB or worsening condition. Patient uses supplemental oxygen and takes medications as prescribed.    Appetite: Patient and staff deny any decreased appetite or unintentional weight loss.    GI/: Staff and patient deny any GI or

## 2025-06-11 ENCOUNTER — OFFICE VISIT (OUTPATIENT)
Age: 89
End: 2025-06-11

## 2025-06-11 VITALS
OXYGEN SATURATION: 98 % | BODY MASS INDEX: 28.36 KG/M2 | SYSTOLIC BLOOD PRESSURE: 104 MMHG | WEIGHT: 165.2 LBS | HEART RATE: 77 BPM | DIASTOLIC BLOOD PRESSURE: 68 MMHG | RESPIRATION RATE: 14 BRPM

## 2025-06-11 DIAGNOSIS — Z71.89 GOALS OF CARE, COUNSELING/DISCUSSION: ICD-10-CM

## 2025-06-11 DIAGNOSIS — M15.0 PRIMARY OSTEOARTHRITIS INVOLVING MULTIPLE JOINTS: Primary | ICD-10-CM

## 2025-06-11 DIAGNOSIS — T40.2X5A CONSTIPATION DUE TO OPIOID THERAPY: ICD-10-CM

## 2025-06-11 DIAGNOSIS — Z79.891 OPIOID USE AGREEMENT EXISTS: ICD-10-CM

## 2025-06-11 DIAGNOSIS — K59.03 CONSTIPATION DUE TO OPIOID THERAPY: ICD-10-CM

## 2025-06-11 DIAGNOSIS — Z51.5 PALLIATIVE CARE ENCOUNTER: ICD-10-CM

## 2025-06-11 DIAGNOSIS — R52 SEVERE PAIN: ICD-10-CM

## 2025-06-11 DIAGNOSIS — H93.8X3 SENSATION OF FULLNESS IN BOTH EARS: ICD-10-CM

## 2025-06-11 RX ORDER — OXYMETAZOLINE HYDROCHLORIDE 0.05 G/100ML
2 SPRAY NASAL EVERY 4 HOURS PRN
COMMUNITY

## 2025-06-11 RX ORDER — SENNA AND DOCUSATE SODIUM 50; 8.6 MG/1; MG/1
1 TABLET, FILM COATED ORAL NIGHTLY
Qty: 30 TABLET | Refills: 0 | Status: SHIPPED | OUTPATIENT
Start: 2025-06-11 | End: 2025-07-11

## 2025-06-11 RX ORDER — SENNA AND DOCUSATE SODIUM 50; 8.6 MG/1; MG/1
1 TABLET, FILM COATED ORAL NIGHTLY
Qty: 30 TABLET | Refills: 0 | Status: SHIPPED | OUTPATIENT
Start: 2025-06-11 | End: 2025-06-11

## 2025-06-11 RX ORDER — HYDROCODONE BITARTRATE AND ACETAMINOPHEN 5; 325 MG/1; MG/1
1 TABLET ORAL 2 TIMES DAILY PRN
Qty: 60 TABLET | Refills: 0 | Status: SHIPPED | OUTPATIENT
Start: 2025-06-11 | End: 2025-07-11

## 2025-06-11 ASSESSMENT — ENCOUNTER SYMPTOMS: RESPIRATORY NEGATIVE: 1

## 2025-06-11 NOTE — PROGRESS NOTES
progresses. Please feel free to reach out to us should you have any questions or requests.     Total time:  50 minutes     Total time includes reviewing labs and tests, reviewing history, medications, and allergies, counseling patient, performing medically appropriate evaluation and examination, ordering medications, and documenting in the medical record.     MATT Salas - CNP    Collaborating physician: Dr. Rangel     Please note this report is partially produced by using speech recognition hardware.  It may contain errors related to the system, including grammar, punctuation and spelling as well as words and phrases that may seem inaccurate.  For any questions or concerns feel free to contact me for clarification.

## 2025-06-26 ENCOUNTER — OFFICE VISIT (OUTPATIENT)
Dept: GERIATRIC MEDICINE | Age: 89
End: 2025-06-26

## 2025-06-26 DIAGNOSIS — I10 ESSENTIAL HYPERTENSION: ICD-10-CM

## 2025-06-26 DIAGNOSIS — E03.9 HYPOTHYROIDISM, UNSPECIFIED TYPE: ICD-10-CM

## 2025-06-26 DIAGNOSIS — J44.9 CHRONIC OBSTRUCTIVE PULMONARY DISEASE, UNSPECIFIED COPD TYPE (HCC): Primary | ICD-10-CM

## 2025-06-26 DIAGNOSIS — I48.0 PAROXYSMAL ATRIAL FIBRILLATION (HCC): ICD-10-CM

## 2025-06-30 NOTE — PROGRESS NOTES
SUBJECTIVE:  89-year-old woman seen follows up for COPD hypertension hypothyroidism patient at baseline coughing.  No nausea vomiting oral intake has been good without chest palpitation      ROS: Cough  The rest of the 14 point ROS negative    PHYSICAL EXAM: VSS per facility record  Pupils are small oral mucosa moist no thrush chest no crackles cardiovascular showed irregular rate abdomen soft nontender    ASSESSMENT & PLAN:   Diagnosis Orders   1. Chronic obstructive pulmonary disease, unspecified COPD type (HCC)        2. Essential hypertension        3. Hypothyroidism, unspecified type        4. Paroxysmal atrial fibrillation (HCC)          Blood pressure stable has been on statin therapy is on Diovan function stable no bleeding at this.  Has been coughing repeat TSH pending.  Has been on            Past Medical History:   Diagnosis Date    BPV (benign positional vertigo) 2019    CAD (coronary artery disease)     Dr York    COPD (chronic obstructive pulmonary disease) ([facility]) 2012, VQ scan and CXR    has supplemental oxygen, uses it for 4-6 hours nightly, no inhalers use    Diverticulosis     Hiatal hernia     Hiatal hernia with gastroesophageal reflux 2015    EGD Dr Singh, large     History of blood transfusion     History of colonoscopy with polypectomy 2017    Dr Singh    History of sleep apnea 2011    no CPAP use    Hypertension     Hypothyroidism     Mixed hyperlipidemia 3/27/2021    Nocturnal hypoxia 2011    nocturnal O2, 3 LNC, 4-6 hours nightly    Obesity (BMI 30-39.9)     Osteoarthritis of multiple joints     Oxygen dependent 2019    Postnasal drip     Septic bursitis of elbow, right 9/26/2021    Sigmoid diverticulosis     Weakness of both legs          Past Surgical History:   Procedure Laterality Date    HYSTERECTOMY (CERVIX STATUS UNKNOWN)      fibroid, bleeding    JOINT REPLACEMENT      VT COLON CA SCRN NOT HI RSK IND N/A 9/1/2017    COLONOSCOPY performed by Carleen Ospina MD at [facility] [place]    VT

## 2025-07-11 ENCOUNTER — OFFICE VISIT (OUTPATIENT)
Dept: GERIATRIC MEDICINE | Age: 89
End: 2025-07-11

## 2025-07-11 DIAGNOSIS — I48.0 PAROXYSMAL ATRIAL FIBRILLATION (HCC): ICD-10-CM

## 2025-07-11 DIAGNOSIS — J44.9 CHRONIC OBSTRUCTIVE PULMONARY DISEASE, UNSPECIFIED COPD TYPE (HCC): Primary | ICD-10-CM

## 2025-07-11 DIAGNOSIS — E03.9 HYPOTHYROIDISM, UNSPECIFIED TYPE: ICD-10-CM

## 2025-07-11 DIAGNOSIS — I73.9 PAD (PERIPHERAL ARTERY DISEASE): ICD-10-CM

## 2025-07-11 DIAGNOSIS — I10 ESSENTIAL HYPERTENSION: ICD-10-CM

## 2025-08-20 ENCOUNTER — OFFICE VISIT (OUTPATIENT)
Dept: GERIATRIC MEDICINE | Age: 89
End: 2025-08-20

## 2025-08-20 DIAGNOSIS — J42 CHRONIC BRONCHITIS, UNSPECIFIED CHRONIC BRONCHITIS TYPE (HCC): ICD-10-CM

## 2025-08-20 DIAGNOSIS — F51.01 PRIMARY INSOMNIA: ICD-10-CM

## 2025-08-20 DIAGNOSIS — I10 ESSENTIAL HYPERTENSION: Primary | ICD-10-CM

## 2025-08-22 ENCOUNTER — OFFICE VISIT (OUTPATIENT)
Age: 89
End: 2025-08-22

## 2025-08-22 DIAGNOSIS — K59.03 CONSTIPATION DUE TO OPIOID THERAPY: ICD-10-CM

## 2025-08-22 DIAGNOSIS — Z51.5 PALLIATIVE CARE ENCOUNTER: ICD-10-CM

## 2025-08-22 DIAGNOSIS — M15.0 PRIMARY OSTEOARTHRITIS INVOLVING MULTIPLE JOINTS: Primary | ICD-10-CM

## 2025-08-22 DIAGNOSIS — T40.2X5A CONSTIPATION DUE TO OPIOID THERAPY: ICD-10-CM

## 2025-08-22 RX ORDER — AMMONIUM LACTATE 12 G/100G
1 LOTION TOPICAL PRN
COMMUNITY

## 2025-08-22 RX ORDER — SENNA AND DOCUSATE SODIUM 50; 8.6 MG/1; MG/1
1 TABLET, FILM COATED ORAL NIGHTLY
COMMUNITY

## 2025-08-22 RX ORDER — ACETAMINOPHEN 325 MG/1
650 TABLET ORAL EVERY 4 HOURS PRN
COMMUNITY
End: 2025-08-22 | Stop reason: SDUPTHER

## 2025-08-22 ASSESSMENT — ENCOUNTER SYMPTOMS: RESPIRATORY NEGATIVE: 1

## 2025-08-23 VITALS
SYSTOLIC BLOOD PRESSURE: 126 MMHG | RESPIRATION RATE: 14 BRPM | DIASTOLIC BLOOD PRESSURE: 76 MMHG | OXYGEN SATURATION: 96 % | HEART RATE: 73 BPM

## (undated) DEVICE — SONY PRINTER PAPER

## (undated) DEVICE — Device: Brand: ENDO SMARTCAP

## (undated) DEVICE — SNARE ENDOSCP M L240CM W27MM SHTH DIA2.4MM CHN 2.8MM HEX

## (undated) DEVICE — CONMED SCOPE SAVER BITE BLOCK, 20X27 MM: Brand: SCOPE SAVER

## (undated) DEVICE — 1200CC COLLECTION UNIT 6MM X 6' PATIENT: Brand: MEDI-VAC

## (undated) DEVICE — CANNULA CAPNOGRAPHY AD O2 TBNG L7FT FEM LUER STYL 4707F7725] SALTER LABS INC]

## (undated) DEVICE — BRUSH CLEANING ENDOSCOPE CHAN DISP

## (undated) DEVICE — GLOVE SURG SZ 85 STD WHT LTX SYN POLYMER BEAD REINF ANTI RL

## (undated) DEVICE — LUBRICANT SURG JELLY ST BACTER TUBE 4.25OZ

## (undated) DEVICE — TUBE SET 96 MM 64 MM H2O PERISTALTIC STD AUX CHANNEL

## (undated) DEVICE — ENDO CARRY-ON PROCEDURE KIT: Brand: ENDO CARRY-ON PROCEDURE KIT

## (undated) DEVICE — 2000CC GUARDIAN II: Brand: GUARDIAN

## (undated) DEVICE — ELECTRODE PT RET AD L9FT HI MOIST COND ADH HYDRGEL CORDED

## (undated) DEVICE — ADAPTER FLSH PMP FLD MGMT GI IRRIG OFP 2 DISPOSABLE